# Patient Record
Sex: MALE | Race: WHITE | NOT HISPANIC OR LATINO | Employment: FULL TIME | URBAN - METROPOLITAN AREA
[De-identification: names, ages, dates, MRNs, and addresses within clinical notes are randomized per-mention and may not be internally consistent; named-entity substitution may affect disease eponyms.]

---

## 2017-09-28 ENCOUNTER — GENERIC CONVERSION - ENCOUNTER (OUTPATIENT)
Dept: FAMILY MEDICINE CLINIC | Facility: CLINIC | Age: 47
End: 2017-09-28

## 2017-12-22 ENCOUNTER — ALLSCRIPTS OFFICE VISIT (OUTPATIENT)
Dept: OTHER | Facility: OTHER | Age: 47
End: 2017-12-22

## 2017-12-22 LAB — HBA1C MFR BLD HPLC: 5.1 %

## 2018-01-23 VITALS
DIASTOLIC BLOOD PRESSURE: 108 MMHG | HEIGHT: 70 IN | HEART RATE: 79 BPM | WEIGHT: 189 LBS | BODY MASS INDEX: 27.06 KG/M2 | SYSTOLIC BLOOD PRESSURE: 144 MMHG

## 2018-01-23 NOTE — PROGRESS NOTES
Assessment    1  Encounter for preventive health examination (V70 0) (Z00 00)   2  Hypertension (401 9) (I10)   3  Hypercholesterolemia (272 0) (E78 00)   4  Asthma, mild intermittent (493 90) (J45 20)   5  Allergic rhinitis (477 9) (J30 9)   6  Muro's esophagus (530 85) (K22 70)   7  Impaired fasting glucose (790 21) (R73 01)   8  Sleep apnea (780 57) (G47 30)   · no CPAP    Plan  Asthma, mild intermittent    · ProAir  (90 Base) MCG/ACT Inhalation Aerosol Solution; INHALE 2  PUFFS EVERY 6 HOURS AS NEEDED  Hypercholesterolemia    · (1) LIPID PANEL FASTING W DIRECT LDL REFLEX; Status:Active; Requested  for:22Mar2018;   Hypertension    · Bystolic 5 MG Oral Tablet   · Benazepril HCl - 40 MG Oral Tablet; take 1 tablet by mouth daily   · HydroCHLOROthiazide 12 5 MG Oral Capsule; TAKE ONE CAPSULE BY  MOUTH EVERY DAY  Impaired fasting glucose    · Hemoglobin A1c- POC; Status:Complete;   Done: 51QOY6122 09:34AM  SocHx: Marital History    · Atenolol 50 MG Oral Tablet; TAKE 1 TABLET DAILY    Discussion/Summary  Impression: health maintenance visit  Currently, he encouraged to improve diet and exercise - aim 150 minutes/week  Prostate cancer screening: PSA is not indicated  Testicular cancer screening: the risks and benefits of testicular cancer screening were discussed and monthly self testicular exam was advised  Colorectal cancer screening: the next colonoscopy is due overdue  Screening lab work includes labs reviewed from work Pe  The immunizations are up to date  Advice and education were given regarding nutrition, aerobic exercise and weight loss  Patient discussion: discussed with the patient  1  Mild intermittent asthma -the patient will continue with ProAir as needed  2  Allergic rhinitis -patient will continue with Claritin D rarely as needed  3  Muro's esophagus -he will continue with the Nexium 40 mg twice daily and will be following up with GI soon      4  Hypercholesterolemia -we reviewed that based upon his LDL of 191, it is recommended that he start a cholesterol-lowering medication  He would prefer to be able to lower this on his own with diet and exercise  He will try to limit his fat intake and to restart exercising on a regular basis  I encouraged him to aim for 150 minutes of exercise on a weekly basis  He should also limit his red meat intake to 2 times per week and limit his intake of fried foods and water  He was provided a lipid panel to repeat in about 3 months  5  Impaired fasting glucose -we reviewed that his blood sugar during his recent work physical was mildly elevated at 114  An A1c was performed today in the office but was normal at 5 1%  We will continue monitor this  6  Hypertension -uncontrolled -the patient will continue with benazepril 40 mg daily and hydrochlorothiazide 12 5 mg daily  His Bystolic 5 mg was changed to atenolol 50 mg daily  He will follow up in about 1 month to assess how he is tolerating the medication and how it is working for him  He should call with any problems or concerns in the interim  Possible side effects of new medications were reviewed with the patient/guardian today  The treatment plan was reviewed with the patient/guardian  The patient/guardian understands and agrees with the treatment plan      Chief Complaint  physical      History of Present Illness  HM, Adult Male: The patient is being seen for a health maintenance evaluation  General Health: The patient's health since the last visit is described as fair  He does not have regular dental visits  The patient reports his last dental visit was 1-2 years  He denies vision problems (but has sarcoid that he should be following up on)  Vision care includes an eye examination more than a year ago  He denies hearing loss  Immunizations status: not up to date  Lifestyle:  He does not have a healthy diet  He does not exercise regularly   He uses tobacco  The patient occasional cigarette over the last few months  He consumes alcohol  He denies drug use  Reproductive health:  He denies erectile dysfunction  Screening: Prostate cancer screening includes no previous digital rectal examination  Testicular cancer screening includes no self testicular examinations  Colorectal cancer screening includes last colonoscopy done 1/2014  Metabolic screening includes lipid profile performed 2017 and glucose screening performed 2017  Safety elements used: seat belt, sunscreen, smoke detector and carbon monoxide detector  Risk findings: no domestic violence  HPI: notes that he has gained weight over the last 3-4 months since traveling and eating out more/exercising less     no BP checks lately - needs to change Bystolic to generic beta-blocker since insurance no longer wants to cover it    notes that he had a work physical - notes that he had gotten diet down to 172 pounds in the past - concerned about high cholesterol readings - knows that he needs to make changes with his diet and exercise but concerned since cholesterol was elevated    has some allergies and asthma - takes the Claritin-D rarely and ProAir <1 time a month     going to schedule EGD and colonoscopy today for soon - has GERD symptoms occasionally - sometimes forgets the second Nexium (about 3 times a week) and does get symptomatic on those days      Review of Systems    Constitutional: no fever and no chills  Cardiovascular: no chest pain and no palpitations  Respiratory: no shortness of breath, no cough and no wheezing  Gastrointestinal: no nausea, no vomiting, no diarrhea and no blood in stools  Genitourinary: no dysuria  Musculoskeletal: cramps occasional in the calves, but no arthralgias and no myalgias  Integumentary: no rashes and no skin lesions  Neurological: no headache, no dizziness and no fainting  Psychiatric: no anxiety and no depression  Endocrine: no erectile dysfunction     Hematologic/Lymphatic: no tendency for easy bleeding and no tendency for easy bruising  Active Problems    1  Allergic rhinitis (477 9) (J30 9)   2  Allergy to insect bites and stings (V15 06) (Z91 038)   3  Asthma, mild intermittent (493 90) (J45 20)   4  Muro's esophagus (530 85) (K22 70)   5  Hematuria (599 70) (R31 9)   6  Hiatal hernia (553 3) (K44 9)   7  History of allergy (V15 09) (Z88 9)   8  Hypercholesterolemia (272 0) (E78 00)   9  Hypertension (401 9) (I10)   10  Need for revaccination (V05 9) (Z23)   11  Need for Tdap vaccination (V06 1) (Z23)   12  Sarcoidosis (135) (D86 9)   13  Sleep apnea (780 57) (G47 30)   14  Stress reaction, acute, mixed (308 4) (F43 0)    Past Medical History    · History of Acute upper respiratory infection (465 9) (J06 9)   · History of chest pain (V13 89) (F50 673)   · History of upper respiratory infection (V12 09) (Z87 09)   · History of Sarcoidosis (135) (D86 9)    Surgical History    · History of Complete Colonoscopy   · History of Diagnostic Cystoscopy   · History of Diagnostic Esophagogastroduodenoscopy    Family History  Mother    · Family history of Colon Cancer (V16 0)  Father    · Family history of hyperlipidemia (V18 19) (Z83 49)   · Family history of Pancreatic Adenocarcinoma Of The Ampulla Of Vater  Sister    · Family history of Cholecystectomy  Maternal Uncle    · Family history of Colon Cancer (V16 0)    Social History    · Alcohol Use (History)   · APPROX 20 BEER/WK   · Denied: History of Drug Use   · Former cigarette smoker (V15 82) (L84 325)   · smoked about 1 pack every 2 weeks for 2 years in his 25s   · Marital History   · HOME WITH WIFE AND DAUGHTER   · Work History   ·     Current Meds   1  Benazepril HCl - 40 MG Oral Tablet; take 1 tablet by mouth daily; Therapy: 89DGT6160 to (Evaluate:15Apr2018)  Requested for: 40ARX7952; Last   Rx:54Nyd1788 Ordered   2  Bystolic 5 MG Oral Tablet; take 1 tablet by mouth every day;    Therapy: 08QXM0472 to (Evaluate:07Jan2018) Requested for: 28SIK5163; Last   Rx:25Oai2030 Ordered   3  Claritin-D 24 Hour  MG Oral Tablet Extended Release 24 Hour; TAKE 1 TABLET   DAILY AS DIRECTED; Therapy: 50BZH0813 to Recorded   4  HydroCHLOROthiazide 12 5 MG Oral Capsule; TAKE ONE CAPSULE BY MOUTH   EVERY DAY; Therapy: 63FDP3263 to (Navdeep Chauhan)  Requested for: 44NSI7179; Last   Rx:09Jan2017 Ordered   5  NexIUM 40 MG Oral Capsule Delayed Release; TAKE 1 CAPSULE TWICE DAILY; Therapy: 13QRZ5669 to (Evaluate:20Nov2015) Recorded   6  ProAir  (90 Base) MCG/ACT Inhalation Aerosol Solution; INHALE 2 PUFFS   EVERY 6 HOURS AS NEEDED; Therapy: 00JPH7405 to (Evaluate:67Lwg1408)  Requested for: 72YXU9363; Last   Rx:30Nov2015 Ordered    Allergies    1  No Known Drug Allergies    2  Seasonal    Vitals   Recorded: 22Dec2017 09:24AM Recorded: 91Irb8352 08:34AM   Heart Rate  79   Systolic 527 763   Diastolic 517 314   Height  5 ft 10 in   Weight  189 lb    BMI Calculated  27 12   BSA Calculated  2 04     Physical Exam    Constitutional   General appearance: No acute distress, well appearing and well nourished  Head and Face   Head and face: Normal     Eyes   Conjunctiva and lids: No erythema, swelling or discharge  Pupils and irises: Equal, round, reactive to light  Ears, Nose, Mouth, and Throat   External inspection of ears and nose: Normal     Otoscopic examination: Tympanic membranes translucent with normal light reflex  Canals patent without erythema  Hearing: Normal     Nasal mucosa, septum, and turbinates: Normal without edema or erythema  Lips, teeth, and gums: Normal, good dentition  Oropharynx: Normal with no erythema, edema, exudate or lesions  Neck   Neck: Supple, symmetric, trachea midline, no masses  Thyroid: Normal, no thyromegaly  Pulmonary   Respiratory effort: No increased work of breathing or signs of respiratory distress  Auscultation of lungs: Clear to auscultation      Cardiovascular Auscultation of heart: Normal rate and rhythm, normal S1 and S2, no murmurs  Peripheral vascular exam: Normal   Carotid: no bruit on the right and no bruit on the left  Radial: right 2+ and left 2+  Posterior tibialis: right 2+ and left 2+  Examination of extremities for edema and/or varicosities: Normal   no edema  Abdomen   Abdomen: Non-tender, no masses  Liver and spleen: No hepatomegaly or splenomegaly  Lymphatic   Palpation of lymph nodes in neck: No lymphadenopathy  Musculoskeletal   Gait and station: Normal     Muscle strength/tone: Normal   Motor Strength Findings: normal upper extremity strength and normal lower extremity strength  Skin   Skin and subcutaneous tissue: Normal without rashes or lesions  Neurologic   Sensation: No sensory loss  Sensory exam: intact to light touch  Psychiatric   Mood and affect: Normal        Results/Data  PHQ-9 Adult Depression Screening 02Hlm8751 10:22AM User, [facility]     Test Name Result Flag Reference   PHQ-9 Adult Depression Score 7     Over the last two weeks, how often have you been bothered by any of the following problems? Little interest or pleasure in doing things: Several days - 1  Feeling down, depressed, or hopeless: Not at all - 0  Trouble falling or staying asleep, or sleeping too much: More than half the days - 2  Feeling tired or having little energy: More than half the days - 2  Poor appetite or over eating: More than half the days - 2  Feeling bad about yourself - or that you are a failure or have let yourself or your family down: Not at all - 0  Trouble concentrating on things, such as reading the newspaper or watching television: Not at all - 0  Moving or speaking so slowly that other people could have noticed   Or the opposite -  being so fidgety or restless that you have been moving around a lot more than usual: Not at all - 0  Thoughts that you would be better off dead, or of hurting yourself in some way: Not at all - 0   PHQ-9 Adult Depression Screening Negative     PHQ-9 Difficulty Level Not difficult at all     PHQ-9 Severity Mild Depression       Hemoglobin A1c- POC 10YNW7634 09:34AM Germaine Antony     Test Name Result Flag Reference   HEMOGLOBIN A1C 5 1         Future Appointments    Date/Time Provider Specialty Site   01/29/2018 11:20 AM Germaine Antony, [facility] [address] [facility] MEDICINE     Signatures   Electronically signed by : Martina Bailey [facility]; Dec 22 2017 10:48AM EST                       (Author)    Electronically signed by : CARTER Zimmerman ; Dec 22 2017  3:00PM EST

## 2018-01-24 DIAGNOSIS — I10 HYPERTENSION, UNSPECIFIED TYPE: Primary | ICD-10-CM

## 2018-01-24 RX ORDER — ATENOLOL 50 MG/1
1 TABLET ORAL DAILY
COMMUNITY
Start: 2017-12-22 | End: 2018-01-24 | Stop reason: SDUPTHER

## 2018-01-25 RX ORDER — ATENOLOL 50 MG/1
50 TABLET ORAL DAILY
Qty: 30 TABLET | Refills: 5 | Status: SHIPPED | OUTPATIENT
Start: 2018-01-25 | End: 2018-02-12 | Stop reason: SDUPTHER

## 2018-02-09 PROBLEM — R73.01 IMPAIRED FASTING GLUCOSE: Status: ACTIVE | Noted: 2017-12-22

## 2018-02-09 RX ORDER — NEBIVOLOL 5 MG/1
1 TABLET ORAL DAILY
COMMUNITY
Start: 2014-09-24 | End: 2018-02-12

## 2018-02-09 RX ORDER — HYDROCHLOROTHIAZIDE 12.5 MG/1
12.5 CAPSULE, GELATIN COATED ORAL DAILY
COMMUNITY
Start: 2014-12-01 | End: 2018-04-23 | Stop reason: DRUGHIGH

## 2018-02-09 RX ORDER — ESOMEPRAZOLE MAGNESIUM 40 MG/1
1 CAPSULE, DELAYED RELEASE ORAL 2 TIMES DAILY
COMMUNITY
Start: 2014-11-25 | End: 2018-02-12

## 2018-02-09 RX ORDER — ALBUTEROL SULFATE 90 UG/1
2 AEROSOL, METERED RESPIRATORY (INHALATION) EVERY 6 HOURS PRN
COMMUNITY
Start: 2010-11-30

## 2018-02-09 RX ORDER — LORATADINE AND PSEUDOEPHEDRINE 10; 240 MG/1; MG/1
1 TABLET, EXTENDED RELEASE ORAL DAILY PRN
COMMUNITY
Start: 2013-09-24 | End: 2019-09-06

## 2018-02-09 RX ORDER — BENAZEPRIL HYDROCHLORIDE 40 MG/1
40 TABLET, FILM COATED ORAL DAILY
COMMUNITY
Start: 2014-09-24 | End: 2018-03-26 | Stop reason: SDUPTHER

## 2018-02-09 NOTE — PROGRESS NOTES
[facility]  FAMILY PRACTICE OFFICE VISIT       NAME: Franck Trent  AGE: 41 y o  SEX: male       : 1970        MRN: [id number]    DATE: 2018  TIME: 2:02 PM    Assessment and Plan     Problem List Items Addressed This Visit     Hypercholesterolemia     Continue to limit fat intake - recheck lipid panel next month as well as coronary calcium score - will review results at follow-up next month         Relevant Orders    CT coronary calcium score    Hypertension - Primary     Uncontrolled - increase atenolol to 100 mg daily and continue with benazepril 40 mg renita and HCTZ 12 5 mg daily - follow-up in 1 month         Relevant Medications    atenolol (TENORMIN) 100 mg tablet          Hypercholesterolemia  Continue to limit fat intake - recheck lipid panel next month as well as coronary calcium score - will review results at follow-up next month    Hypertension  Uncontrolled - increase atenolol to 100 mg daily and continue with benazepril 40 mg renita and HCTZ 12 5 mg daily - follow-up in 1 month          Chief Complaint     Chief Complaint   Patient presents with    Hypertension       History of Present Illness   Ivy Guerrero is a 41y o -year-old male who presents for HTN follow-up  The patient presents today for follow-up on his high blood pressure  It was uncontrolled that is last appointment about a month and a half ago  He was directed to continue with the benazepril 40 mg daily and hydrochlorothiazide 12 5 mg daily  His Bystolic 5 mg was changed to atenolol 50 mg daily  He returns today to review how he is tolerating the medication and how it is working for him  He states that he has been checking his blood pressure at home in getting readings around 130s/high 90s  He has also been working on decreasing his fat intake and exercising so that he can lower his cholesterol as he was not interested in starting a cholesterol medication    He should be repeating blood work at the end of March to follow-up on this  He notes that his sister suggested that he also get a coronary calcium score to help with decision of whether to start a statin or not  Review of Systems   Review of Systems   Constitutional: Negative for chills and fever  Respiratory: Negative for shortness of breath  Cardiovascular: Negative for chest pain, palpitations and leg swelling  Neurological: Negative for dizziness and headaches  Psychiatric/Behavioral: The patient is nervous/anxious          Active Problem List     Patient Active Problem List   Diagnosis    Allergic rhinitis    Muro's esophagus    Asthma, mild intermittent    Hematuria    Hiatal hernia    Hypercholesterolemia    Hypertension    Impaired fasting glucose    Sarcoidosis    Sleep apnea    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         Past Medical History:  Past Medical History:   Diagnosis Date    Chest pain     Sarcoidosis        Past Surgical History:  Past Surgical History:   Procedure Laterality Date    COLONOSCOPY      complete     CYSTOSCOPY      Diagnostic - Neg 2007    ESOPHAGOGASTRODUODENOSCOPY      Diagnostic        Family History:  Family History   Problem Relation Age of Onset    Colon cancer Mother      age in 35s   Iftikhar Contreras Hyperlipidemia Mother     Hyperlipidemia Father     Pancreatic cancer Father      Adenocarcinoma of the ampulla of vater     Colon cancer Maternal Uncle        Social History:  Social History     Social History    Marital status: /Civil Union     Spouse name: N/A    Number of children: N/A    Years of education: N/A     Occupational History           Social History Main Topics    Smoking status: Former Smoker     Types: Cigarettes     Quit date: 1999    Smokeless tobacco: Former User      Comment: smoked about 1 pack every 2 weeks for 2 years in his 21's     Alcohol use Yes      Comment: Approx 20 beers a week    Iftikhar Contreras Drug use: No    Sexual activity: Not on file     Other Topics Concern    Not on file     Social History Narrative    Lives home with wife and daughter        Objective     Vitals:    02/12/18 1355   BP: (!) 146/102   Pulse:    Temp:      Wt Readings from Last 3 Encounters:   02/12/18 86 8 kg (191 lb 4 oz)   12/22/17 85 7 kg (189 lb)   11/30/15 80 1 kg (176 lb 8 oz)       Physical Exam   Constitutional: He appears well-developed and well-nourished  HENT:   Head: Normocephalic and atraumatic  Neck: Neck supple  No thyromegaly present  Cardiovascular: Normal rate, regular rhythm, normal heart sounds and intact distal pulses  No murmur heard  No carotid bruits noted, no LE edema   Pulmonary/Chest: Effort normal and breath sounds normal    Lymphadenopathy:     He has no cervical adenopathy  Neurological: He is alert  Skin: Skin is warm and dry  Psychiatric: He has a normal mood and affect         Pertinent Laboratory/Diagnostic Studies:    Results for orders placed or performed in visit on 12/22/17   POCT hemoglobin A1c (Historical)   Result Value Ref Range    Hemoglobin A1C 5 1        Orders Placed This Encounter   Procedures    CT coronary calcium score       ALLERGIES:  Allergies   Allergen Reactions    Other      seasonal       Current Medications     Current Outpatient Prescriptions   Medication Sig Dispense Refill    albuterol (PROAIR HFA) 90 mcg/act inhaler Inhale 2 puffs every 6 (six) hours as needed      atenolol (TENORMIN) 100 mg tablet Take 1 tablet (100 mg total) by mouth daily 30 tablet 1    benazepril (LOTENSIN) 40 MG tablet Take 40 mg by mouth daily        hydrochlorothiazide (MICROZIDE) 12 5 mg capsule Take 12 5 mg by mouth daily        loratadine-pseudoephedrine (CLARITIN-D 24 HOUR)  mg per 24 hr tablet Take 1 tablet by mouth daily      omeprazole (PriLOSEC) 40 MG capsule TAKE 1 CAPSULE TWO TIMES DAILY (1/2 HOUR BEFORE BREAKFAST AND DINNER)  2     No current facility-administered medications for this visit            Health Maintenance     Health Maintenance   Topic Date Due    HIV SCREENING  1970    PNEUMOCOCCAL POLYSACCHARIDE VACCINE AGE 2-64 HIGH RISK  02/21/1974    DTaP,Tdap,and Td Vaccines (3 - Td) 07/11/2017    INFLUENZA VACCINE  Completed     Immunization History   Administered Date(s) Administered    Influenza Quadrivalent Preservative Free 3 years and older IM 10/01/2017    Influenza TIV (IM) 01/02/2007, 11/08/2015    Tdap 11/30/2015, 01/11/2017       Tenisha Andrea PA-C  2/12/2018 2:02 PM  Carlos MAURICIO [facility]

## 2018-02-12 ENCOUNTER — OFFICE VISIT (OUTPATIENT)
Dept: FAMILY MEDICINE CLINIC | Facility: CLINIC | Age: 48
End: 2018-02-12
Payer: COMMERCIAL

## 2018-02-12 VITALS
WEIGHT: 191.25 LBS | BODY MASS INDEX: 27.38 KG/M2 | SYSTOLIC BLOOD PRESSURE: 146 MMHG | HEIGHT: 70 IN | DIASTOLIC BLOOD PRESSURE: 102 MMHG | HEART RATE: 108 BPM | TEMPERATURE: 97.7 F

## 2018-02-12 DIAGNOSIS — I10 HYPERTENSION, UNSPECIFIED TYPE: ICD-10-CM

## 2018-02-12 DIAGNOSIS — I10 ESSENTIAL HYPERTENSION: Primary | ICD-10-CM

## 2018-02-12 DIAGNOSIS — E78.00 HYPERCHOLESTEROLEMIA: ICD-10-CM

## 2018-02-12 PROCEDURE — 99213 OFFICE O/P EST LOW 20 MIN: CPT | Performed by: PHYSICIAN ASSISTANT

## 2018-02-12 RX ORDER — ATENOLOL 100 MG/1
100 TABLET ORAL DAILY
Qty: 30 TABLET | Refills: 1 | Status: SHIPPED | OUTPATIENT
Start: 2018-02-12 | End: 2018-04-21 | Stop reason: SDUPTHER

## 2018-02-12 RX ORDER — OMEPRAZOLE 40 MG/1
CAPSULE, DELAYED RELEASE ORAL
Refills: 2 | COMMUNITY
Start: 2018-01-20 | End: 2018-07-23

## 2018-02-12 NOTE — ASSESSMENT & PLAN NOTE
Continue to limit fat intake - recheck lipid panel next month as well as coronary calcium score - will review results at follow-up next month

## 2018-02-12 NOTE — ASSESSMENT & PLAN NOTE
Uncontrolled - increase atenolol to 100 mg daily and continue with benazepril 40 mg renita and HCTZ 12 5 mg daily - follow-up in 1 month

## 2018-02-12 NOTE — PATIENT INSTRUCTIONS
Hypercholesterolemia  Continue to limit fat intake - recheck lipid panel next month as well as coronary calcium score - will review results at follow-up next month    Hypertension  Uncontrolled - increase atenolol to 100 mg daily and continue with benazepril 40 mg renita and HCTZ 12 5 mg daily - follow-up in 1 month

## 2018-02-15 ENCOUNTER — TELEPHONE (OUTPATIENT)
Dept: FAMILY MEDICINE CLINIC | Facility: CLINIC | Age: 48
End: 2018-02-15

## 2018-02-15 DIAGNOSIS — I10 HYPERTENSION, UNSPECIFIED TYPE: Primary | ICD-10-CM

## 2018-02-15 NOTE — TELEPHONE ENCOUNTER
When scheduling CT coronary calc score, he was told that he needs a BUN and Creat done due to htn  Did you want to order anything else?   Call him when order ready

## 2018-02-19 ENCOUNTER — APPOINTMENT (OUTPATIENT)
Dept: LAB | Facility: CLINIC | Age: 48
End: 2018-02-19
Payer: COMMERCIAL

## 2018-02-19 ENCOUNTER — TRANSCRIBE ORDERS (OUTPATIENT)
Dept: LAB | Facility: CLINIC | Age: 48
End: 2018-02-19

## 2018-02-19 DIAGNOSIS — R22.1 LUMP ON NECK: Primary | ICD-10-CM

## 2018-02-19 DIAGNOSIS — I10 HYPERTENSION, UNSPECIFIED TYPE: ICD-10-CM

## 2018-02-19 LAB
BUN SERPL-MCNC: 13 MG/DL (ref 5–25)
CREAT SERPL-MCNC: 0.94 MG/DL (ref 0.6–1.3)
GFR SERPL CREATININE-BSD FRML MDRD: 98 ML/MIN/1.73SQ M

## 2018-02-19 PROCEDURE — 84520 ASSAY OF UREA NITROGEN: CPT

## 2018-02-19 PROCEDURE — 82565 ASSAY OF CREATININE: CPT

## 2018-02-19 PROCEDURE — 36415 COLL VENOUS BLD VENIPUNCTURE: CPT

## 2018-02-28 ENCOUNTER — HOSPITAL ENCOUNTER (OUTPATIENT)
Dept: CT IMAGING | Facility: HOSPITAL | Age: 48
Discharge: HOME | End: 2018-02-28
Payer: COMMERCIAL

## 2018-02-28 ENCOUNTER — APPOINTMENT (OUTPATIENT)
Dept: LAB | Facility: CLINIC | Age: 48
End: 2018-02-28
Payer: COMMERCIAL

## 2018-02-28 DIAGNOSIS — E78.00 HYPERCHOLESTEROLEMIA: ICD-10-CM

## 2018-02-28 DIAGNOSIS — E78.00 PURE HYPERCHOLESTEROLEMIA: ICD-10-CM

## 2018-02-28 LAB
CHOLEST SERPL-MCNC: 270 MG/DL (ref 50–200)
HDLC SERPL-MCNC: 44 MG/DL (ref 40–60)
LDLC SERPL CALC-MCNC: 160 MG/DL (ref 0–100)
TRIGL SERPL-MCNC: 329 MG/DL

## 2018-02-28 PROCEDURE — 36415 COLL VENOUS BLD VENIPUNCTURE: CPT

## 2018-02-28 PROCEDURE — 80061 LIPID PANEL: CPT

## 2018-03-07 NOTE — PROGRESS NOTES
History of Present Illness    Revaccination   Vaccine Information: Vaccine(s) Given (names): Adacel 11/30/15  Pt called (attempt 1): [id number]  l/m  Pt called (attempt 2): [id number] nw  l/m  Pt called (attempt 3): [id number] 5370 nw  l/m  Letter Sent (Regular and Certified): [id number] NW  Revaccination Completed: [id number]  Active Problems    1  Acute upper respiratory infection (465 9) (J06 9)   2  Allergic rhinitis (477 9) (J30 9)   3  Allergy to insect bites and stings (V15 06) (Z91 038)   4  Asthma (493 90) (J45 909)   5  Muro's esophagus (530 85) (K22 70)   6  Chest pain (786 50) (R07 9)   7  Hematuria (599 70) (R31 9)   8  Hiatal hernia (553 3) (K44 9)   9  History of allergy (V15 09) (Z88 9)   10  Hypercholesterolemia (272 0) (E78 00)   11  Hypertension (401 9) (I10)   12  Need for revaccination (V05 9) (Z23)   13  Need for Tdap vaccination (V06 1) (Z23)   14  Sarcoidosis (135) (D86 9)   15  Sleep apnea (780 57) (G47 30)   16  Stress reaction, acute, mixed (308 4) (F43 0)    Immunizations  Influenza --- Erven Brod: [id number] Lilian: 08-Nov-2015   Tdap --- Series1: 30-Nov-2015     Current Meds   1  Benazepril HCl - 40 MG Oral Tablet; take 1 tablet by mouth daily   2  Bystolic 5 MG Oral Tablet; take 1 tablet by mouth every day   3  Claritin-D 24 Hour  MG Oral Tablet Extended Release 24 Hour; TAKE 1 TABLET   DAILY AS DIRECTED   4  HydroCHLOROthiazide 12 5 MG Oral Capsule; TAKE ONE CAPSULE BY MOUTH EVERY   DAY   5  NexIUM 40 MG Oral Capsule Delayed Release; TAKE 1 CAPSULE TWICE DAILY   6  ProAir  (90 Base) MCG/ACT Inhalation Aerosol Solution; INHALE 2 PUFFS   EVERY 6 HOURS AS NEEDED    Allergies    1  No Known Drug Allergies    2   Seasonal    Signatures   Electronically signed by : Bettye Guillen, [facility]; Jan 12 2017  6:55PM EST                       (Author)

## 2018-03-22 DIAGNOSIS — E78.00 PURE HYPERCHOLESTEROLEMIA: ICD-10-CM

## 2018-03-22 NOTE — PROGRESS NOTES
[place] [facility]  FAMILY PRACTICE OFFICE VISIT       NAME: Jaymie Hill  AGE: 57 y o  SEX: male       : 1970        MRN: [id number]    DATE: 3/26/2018  TIME: 9:01 AM    Assessment and Plan     Problem List Items Addressed This Visit     Hypercholesterolemia       We reviewed his recent results from the lipid panel as well as his CT coronary calcium score  His coronary calcium score was 3 and did not show any significant buildup in his vessels  His lipid panel did show an elevated total cholesterol of 270 as well as an LDL of 160  His calculated ASCVD risk including his once weekly cigarette was 16 1%  We reviewed that this drops to 6 5% if he is not smoking any longer  The patient will stop smoking cigarettes  We also agreed that it was appropriate to start atorvastatin 20 mg daily given his family history  We will plan on rechecking lipid panel in 3 months with labs as ordered today  Relevant Medications    atorvastatin (LIPITOR) 20 mg tablet    Other Relevant Orders    Comprehensive metabolic panel    Lipid Panel with Direct LDL reflex    Hypertension - Primary     We reviewed that his blood pressure still elevated today  We also discussed that there appears to be a significant component to his blood pressure elevations in relation to his anxiety level  His blood pressure dropped significantly after taking several deep breaths  He was directed to continue with the atenolol 100 mg daily as well as hydrochlorothiazide 12 5 mg daily  His benazepril was increased to 40 mg twice daily  He will return in 1 month for recheck  He was encouraged to continue checking his blood pressure at home  He was encouraged to bring his blood pressure monitor to his next appointment to verify its accuracy           Relevant Medications    benazepril (LOTENSIN) 40 MG tablet    Other Relevant Orders    Comprehensive metabolic panel    Lipid Panel with Direct LDL reflex    Anxiety The patient was also started on sertraline 25 mg daily  He will return in about 1 month and we will assess how he is doing with medication  He should call with any problems or concerns in the interim  Relevant Medications    sertraline (ZOLOFT) 25 mg tablet          Hypertension  We reviewed that his blood pressure still elevated today  We also discussed that there appears to be a significant component to his blood pressure elevations in relation to his anxiety level  His blood pressure dropped significantly after taking several deep breaths  He was directed to continue with the atenolol 100 mg daily as well as hydrochlorothiazide 12 5 mg daily  His benazepril was increased to 40 mg twice daily  He will return in 1 month for recheck  He was encouraged to continue checking his blood pressure at home  He was encouraged to bring his blood pressure monitor to his next appointment to verify its accuracy  Hypercholesterolemia    We reviewed his recent results from the lipid panel as well as his CT coronary calcium score  His coronary calcium score was 3 and did not show any significant buildup in his vessels  His lipid panel did show an elevated total cholesterol of 270 as well as an LDL of 160  His calculated ASCVD risk including his once weekly cigarette was 16 1%  We reviewed that this drops to 6 5% if he is not smoking any longer  The patient will stop smoking cigarettes  We also agreed that it was appropriate to start atorvastatin 20 mg daily given his family history  We will plan on rechecking lipid panel in 3 months with labs as ordered today  Anxiety    The patient was also started on sertraline 25 mg daily  He will return in about 1 month and we will assess how he is doing with medication  He should call with any problems or concerns in the interim  Face to face time for this visit was a total of 45 minutes   Greater than 50% of this time included coordination of care, discussion of treatment options, and counseling  Chief Complaint     Chief Complaint   Patient presents with    Hypertension       History of Present Illness   Miky Kwok is a 57y o -year-old male who presents for BP follow-up  The patient presents today for follow-up on hypertension  At his visit last month, his atenolol was increased to 100 mg daily  He was directed to continue with his benazepril 40 milligrams daily and HCTZ 12 5 milligram daily  He reports the home readings have been approximately 130s/90-95  He denies symptoms such as chest pain, palpitations, headaches, dizziness, vision changes, and shortness of breath  We also discussed that is last visit his high cholesterol  His repeat lipid panel last month showed  An elevated total cholesterol of 270 and an LDL of 160 with a triglyceride level of 329  His calculated ASCVD risk is 16 1% (would be 6 5% without his one weekly cigarette)  His coronary calcium CT showed clear arteries with a score of 3  He notes that his sister is a physcian and suggested that he start atorvastatin  The patient also notes that his wife is concerned about mood swings  He notes that he has noticed it as well  He feels that he has been more strongly reactive towards things than he should be  He feels that a lot of this began in about 2014 when he stopped gambling and cut back a lot on his drinking  He notes that he has been going to [employer] which has been very helpful in finding appreciation "for the small things in life " He reports that he has discussed this with his physician sister and she suggested that he start sertraline 50 mg daily  Review of Systems   Review of Systems   Constitutional: Negative for chills and fever  Respiratory: Negative for shortness of breath  Cardiovascular: Negative for chest pain, palpitations and leg swelling  Neurological: Negative for dizziness and headaches  Psychiatric/Behavioral: Negative for dysphoric mood  The patient is nervous/anxious          Active Problem List     Patient Active Problem List   Diagnosis    Allergic rhinitis    Muro's esophagus    Asthma, mild intermittent    Hematuria    Hiatal hernia    Hypercholesterolemia    Hypertension    Impaired fasting glucose    Sarcoidosis    Sleep apnea    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    Anxiety         Past Medical History:  Past Medical History:   Diagnosis Date    Chest pain     Sarcoidosis        Past Surgical History:  Past Surgical History:   Procedure Laterality Date    COLONOSCOPY      complete     CYSTOSCOPY      Diagnostic - Neg 2007    ESOPHAGOGASTRODUODENOSCOPY      Diagnostic        Family History:  Family History   Problem Relation Age of Onset    Colon cancer Mother      age in 35s   Grant Penrose Hyperlipidemia Mother     Hyperlipidemia Father     Pancreatic cancer Father      Adenocarcinoma of the ampulla of vater     Colon cancer Maternal Uncle        Social History:  Social History     Social History    Marital status: /Civil Union     Spouse name: N/A    Number of children: N/A    Years of education: N/A     Occupational History           Social History Main Topics    Smoking status: Former Smoker     Types: Cigarettes     Quit date: 1999    Smokeless tobacco: Former User      Comment: smoked about 1 pack every 2 weeks for 2 years in his 21's     Alcohol use Yes      Comment: Approx 20 beers a week     Drug use: No    Sexual activity: Not on file     Other Topics Concern    Not on file     Social History Narrative    Lives home with wife and daughter        Objective     Vitals:    03/26/18 0850   BP: 142/100   Pulse:    SpO2:      Wt Readings from Last 3 Encounters:   03/26/18 84 9 kg (187 lb 4 oz)   02/12/18 86 8 kg (191 lb 4 oz)   12/22/17 85 7 kg (189 lb)       Physical Exam   Constitutional: He appears well-developed and well-nourished  HENT:   Head: Normocephalic and atraumatic  Neck: Neck supple  No thyromegaly present  Cardiovascular: Normal rate, regular rhythm, normal heart sounds and intact distal pulses  No murmur heard  No carotid bruits noted   Pulmonary/Chest: Effort normal and breath sounds normal    Lymphadenopathy:     He has no cervical adenopathy  Neurological: He is alert  Skin: Skin is warm and dry  Psychiatric:   Dysphoric        Pertinent Laboratory/Diagnostic Studies:  Lab Results   Component Value Date    CHOL 270 (H) 02/28/2018     Lab Results   Component Value Date    TRIG 329 (H) 02/28/2018     Lab Results   Component Value Date    HDL 44 02/28/2018     Lab Results   Component Value Date    LDLCALC 160 (H) 02/28/2018     Lab Results   Component Value Date    HGBA1C 5 1 12/22/2017       Results for orders placed or performed in visit on 02/28/18   Lipid Panel with Direct LDL reflex   Result Value Ref Range    Cholesterol 270 (H) 50 - 200 mg/dL    Triglycerides 329 (H) <=150 mg/dL    HDL, Direct 44 40 - 60 mg/dL    LDL Calculated 160 (H) 0 - 100 mg/dL       Orders Placed This Encounter   Procedures    Comprehensive metabolic panel    Lipid Panel with Direct LDL reflex       ALLERGIES:  Allergies   Allergen Reactions    Other      seasonal       Current Medications     Current Outpatient Prescriptions   Medication Sig Dispense Refill    albuterol (PROAIR HFA) 90 mcg/act inhaler Inhale 2 puffs every 6 (six) hours as needed      atenolol (TENORMIN) 100 mg tablet Take 1 tablet (100 mg total) by mouth daily 30 tablet 1    benazepril (LOTENSIN) 40 MG tablet Take 1 tablet twice daily   60 tablet 5    hydrochlorothiazide (MICROZIDE) 12 5 mg capsule Take 12 5 mg by mouth daily        loratadine-pseudoephedrine (CLARITIN-D 24 HOUR)  mg per 24 hr tablet Take 1 tablet by mouth daily      omeprazole (PriLOSEC) 40 MG capsule TAKE 1 CAPSULE TWO TIMES DAILY (1/2 HOUR BEFORE BREAKFAST AND DINNER)  2    atorvastatin (LIPITOR) 20 mg tablet Take 1 tablet (20 mg total) by mouth daily 30 tablet 3    sertraline (ZOLOFT) 25 mg tablet Take 1 tablet (25 mg total) by mouth daily 30 tablet 1     No current facility-administered medications for this visit            Health Maintenance     Health Maintenance   Topic Date Due    HIV SCREENING  1970    PNEUMOCOCCAL POLYSACCHARIDE VACCINE AGE 2-64 HIGH RISK  02/21/1974    Depression Screening PHQ-9  12/22/2018    DTaP,Tdap,and Td Vaccines (2 - Td) 01/11/2027    INFLUENZA VACCINE  Completed     Immunization History   Administered Date(s) Administered    Influenza Quadrivalent Preservative Free 3 years and older IM 10/01/2017    Influenza TIV (IM) 01/02/2007, 11/08/2015    Tdap 11/30/2015, 01/11/2017       Dena Jj PA-C  3/26/2018 9:01 AM  [facility]

## 2018-03-26 ENCOUNTER — OFFICE VISIT (OUTPATIENT)
Dept: FAMILY MEDICINE CLINIC | Facility: CLINIC | Age: 48
End: 2018-03-26
Payer: COMMERCIAL

## 2018-03-26 VITALS
WEIGHT: 187.25 LBS | BODY MASS INDEX: 26.81 KG/M2 | SYSTOLIC BLOOD PRESSURE: 142 MMHG | OXYGEN SATURATION: 97 % | HEART RATE: 70 BPM | DIASTOLIC BLOOD PRESSURE: 100 MMHG | HEIGHT: 70 IN

## 2018-03-26 DIAGNOSIS — E78.00 HYPERCHOLESTEROLEMIA: ICD-10-CM

## 2018-03-26 DIAGNOSIS — F41.9 ANXIETY: ICD-10-CM

## 2018-03-26 DIAGNOSIS — I10 ESSENTIAL HYPERTENSION: Primary | ICD-10-CM

## 2018-03-26 PROCEDURE — 99214 OFFICE O/P EST MOD 30 MIN: CPT | Performed by: PHYSICIAN ASSISTANT

## 2018-03-26 RX ORDER — ATORVASTATIN CALCIUM 20 MG/1
20 TABLET, FILM COATED ORAL DAILY
Qty: 30 TABLET | Refills: 3 | Status: SHIPPED | OUTPATIENT
Start: 2018-03-26 | End: 2018-08-31 | Stop reason: ALTCHOICE

## 2018-03-26 RX ORDER — SERTRALINE HYDROCHLORIDE 25 MG/1
25 TABLET, FILM COATED ORAL DAILY
Qty: 30 TABLET | Refills: 1 | Status: SHIPPED | OUTPATIENT
Start: 2018-03-26 | End: 2018-04-23 | Stop reason: SDUPTHER

## 2018-03-26 RX ORDER — BENAZEPRIL HYDROCHLORIDE 40 MG/1
TABLET, FILM COATED ORAL
Qty: 60 TABLET | Refills: 5 | Status: SHIPPED | OUTPATIENT
Start: 2018-03-26 | End: 2019-03-01 | Stop reason: SDUPTHER

## 2018-03-26 NOTE — PATIENT INSTRUCTIONS
Hypertension  We reviewed that his blood pressure still elevated today  We also discussed that there appears to be a significant component to his blood pressure elevations in relation to his anxiety level  His blood pressure dropped significantly after taking several deep breaths  He was directed to continue with the atenolol 100 mg daily as well as hydrochlorothiazide 12 5 mg daily  His benazepril was increased to 40 mg twice daily  He will return in 1 month for recheck  He was encouraged to continue checking his blood pressure at home  He was encouraged to bring his blood pressure monitor to his next appointment to verify its accuracy  Hypercholesterolemia    We reviewed his recent results from the lipid panel as well as his CT coronary calcium score  His coronary calcium score was 3 and did not show any significant buildup in his vessels  His lipid panel did show an elevated total cholesterol of 270 as well as an LDL of 160  His calculated ASCVD risk including his once weekly cigarette was 16 1%  We reviewed that this drops to 6 5% if he is not smoking any longer  The patient will stop smoking cigarettes  We also agreed that it was appropriate to start atorvastatin 20 mg daily given his family history  We will plan on rechecking lipid panel in 3 months with labs as ordered today  Anxiety    The patient was also started on sertraline 25 mg daily  He will return in about 1 month and we will assess how he is doing with medication  He should call with any problems or concerns in the interim

## 2018-04-21 DIAGNOSIS — I10 HYPERTENSION, UNSPECIFIED TYPE: ICD-10-CM

## 2018-04-21 NOTE — PROGRESS NOTES
[place] [facility]  FAMILY PRACTICE OFFICE VISIT       NAME: Anna Rodriges  AGE: 57 y o  SEX: male       : 1970        MRN: [id number]    DATE: 2018  TIME: 8:25 AM    Assessment and Plan     Problem List Items Addressed This Visit     Hypercholesterolemia       Doing well since starting atorvastatin last month  He will continue with atorvastatin 20 mg daily and recheck labs in about 2 months as previously ordered  Hypertension - Primary     Improved with the increase in benazepril dose  We reviewed that his blood pressure is still slightly elevated though  He will continue with the atenolol 100 mg daily and benazepril 40 mg twice daily  He will increase hydrochlorothiazide to 25 mg daily  He was encouraged to continue to monitor blood pressure at home or the store  We reviewed that his goal would be to remain below 140/90  He was encouraged to continue to limit salt and caffeine intake  Relevant Medications    hydrochlorothiazide (HYDRODIURIL) 25 mg tablet    Anxiety       Improved  He will continue with sertraline 25 mg daily  We will reassess this next visit in 3 months  Relevant Medications    sertraline (ZOLOFT) 25 mg tablet          Hypertension  Improved with the increase in benazepril dose  We reviewed that his blood pressure is still slightly elevated though  He will continue with the atenolol 100 mg daily and benazepril 40 mg twice daily  He will increase hydrochlorothiazide to 25 mg daily  He was encouraged to continue to monitor blood pressure at home or the store  We reviewed that his goal would be to remain below 140/90  He was encouraged to continue to limit salt and caffeine intake  Anxiety    Improved  He will continue with sertraline 25 mg daily  We will reassess this next visit in 3 months  Hypercholesterolemia    Doing well since starting atorvastatin last month    He will continue with atorvastatin 20 mg daily and recheck labs in about 2 months as previously ordered  Chief Complaint     Chief Complaint   Patient presents with    Hypertension    Anxiety       History of Present Illness   Dara Gleason is a 57y o -year-old male who presents for 1 month follow-up  The patient presents today to follow-up on anxiety and HTN  At his visit last month, he was started on Zoloft 25 mg daily to help with his anxiety  He reports that this medication has been improved/calmer  He feels no side effects from the new medication  He had reports his wife was concerned about mood swings (that began when he stopped gambling and cut back on drinking in 2014) and states that she has not been complaining lately  The patient reports that current blood pressure medications include atenolol 100 mg daily, HCTZ 12 5 mg daily and recently increased at last visit, benazepril 40 mg twice daily  Blood pressure readings at home are approximately 120-130s/high 80s-90s  The patient denies symptoms of poor control such as chest pain, shortness of breath, leg swelling, vision changes, headaches, or dizziness  The patient reports has 1-2 cups of coffee in the morning and tries limit salt intake  The patient was started on atorvastatin 20 milligrams daily at his visit last month  Last LDL was 160 and he will be rechecking lipid panel in 3 months  The patient denies myalgias  Review of Systems   Review of Systems   Constitutional: Negative for chills and fever  Respiratory: Negative for shortness of breath  Cardiovascular: Negative for chest pain, palpitations and leg swelling  Neurological: Negative for dizziness and headaches  Psychiatric/Behavioral: The patient is not nervous/anxious          Active Problem List     Patient Active Problem List   Diagnosis    Allergic rhinitis    Muro's esophagus    Asthma, mild intermittent    Hematuria    Hiatal hernia    Hypercholesterolemia    Hypertension    Impaired fasting glucose    Sarcoidosis    Sleep apnea    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    Anxiety         Past Medical History:  Past Medical History:   Diagnosis Date    Chest pain     Sarcoidosis        Past Surgical History:  Past Surgical History:   Procedure Laterality Date    COLONOSCOPY      complete     CYSTOSCOPY      Diagnostic - Neg 2007    ESOPHAGOGASTRODUODENOSCOPY      Diagnostic        Family History:  Family History   Problem Relation Age of Onset    Colon cancer Mother      age in 29s    Hyperlipidemia Mother     Hyperlipidemia Father     Pancreatic cancer Father      Adenocarcinoma of the ampulla of vater     Colon cancer Maternal Uncle        Social History:  Social History     Social History    Marital status: /Civil Union     Spouse name: N/A    Number of children: N/A    Years of education: N/A     Occupational History           Social History Main Topics    Smoking status: Former Smoker     Types: Cigarettes     Quit date: 1999    Smokeless tobacco: Former User      Comment: smoked about 1 pack every 2 weeks for 2 years in his 21's     Alcohol use Yes      Comment: Approx 20 beers a week     Drug use: No    Sexual activity: Not on file     Other Topics Concern    Not on file     Social History Narrative    Lives home with wife and daughter        Objective     Vitals:    04/23/18 0825   BP: 130/90   Pulse:    SpO2:      Wt Readings from Last 3 Encounters:   04/23/18 85 kg (187 lb 8 oz)   03/26/18 84 9 kg (187 lb 4 oz)   02/12/18 86 8 kg (191 lb 4 oz)       Physical Exam   Constitutional: He appears well-developed and well-nourished  HENT:   Head: Normocephalic and atraumatic  Neck: Neck supple  No thyromegaly present  Cardiovascular: Normal rate, regular rhythm, normal heart sounds and intact distal pulses  No murmur heard    No carotid bruits noted   Pulmonary/Chest: Effort normal and breath sounds normal    Musculoskeletal: He exhibits no edema  Lymphadenopathy:     He has no cervical adenopathy  Neurological: He is alert  Psychiatric: He has a normal mood and affect  Pertinent Laboratory/Diagnostic Studies:  Lab Results   Component Value Date    GLUCOSE 99 07/25/2014    BUN 13 02/19/2018    CREATININE 0 94 02/19/2018    CALCIUM 9 8 07/25/2014     07/25/2014    K 3 5 07/25/2014    CO2 29 07/25/2014     07/25/2014     Lab Results   Component Value Date    ALT 64 07/25/2014    AST 30 07/25/2014    ALKPHOS 77 07/25/2014    BILITOT 0 66 07/25/2014       Lab Results   Component Value Date    WBC 7 82 07/25/2014    HGB 16 6 07/25/2014    HCT 47 6 07/25/2014    MCV 88 07/25/2014     07/25/2014     Lab Results   Component Value Date    CHOL 270 (H) 02/28/2018     Lab Results   Component Value Date    TRIG 329 (H) 02/28/2018     Lab Results   Component Value Date    HDL 44 02/28/2018     Lab Results   Component Value Date    LDLCALC 160 (H) 02/28/2018     Lab Results   Component Value Date    HGBA1C 5 1 12/22/2017       Results for orders placed or performed in visit on 02/28/18   Lipid Panel with Direct LDL reflex   Result Value Ref Range    Cholesterol 270 (H) 50 - 200 mg/dL    Triglycerides 329 (H) <=150 mg/dL    HDL, Direct 44 40 - 60 mg/dL    LDL Calculated 160 (H) 0 - 100 mg/dL         ALLERGIES:  Allergies   Allergen Reactions    Other      seasonal       Current Medications     Current Outpatient Prescriptions   Medication Sig Dispense Refill    albuterol (PROAIR HFA) 90 mcg/act inhaler Inhale 2 puffs every 6 (six) hours as needed      atenolol (TENORMIN) 100 mg tablet TAKE 1 TABLET (100 MG TOTAL) BY MOUTH DAILY 30 tablet 5    atorvastatin (LIPITOR) 20 mg tablet Take 1 tablet (20 mg total) by mouth daily 30 tablet 3    benazepril (LOTENSIN) 40 MG tablet Take 1 tablet twice daily   60 tablet 5    loratadine-pseudoephedrine (CLARITIN-D 24 HOUR)  mg per 24 hr tablet Take 1 tablet by mouth daily      omeprazole (PriLOSEC) 40 MG capsule TAKE 1 CAPSULE TWO TIMES DAILY (1/2 HOUR BEFORE BREAKFAST AND DINNER)  2    sertraline (ZOLOFT) 25 mg tablet Take 1 tablet (25 mg total) by mouth daily 90 tablet 1    hydrochlorothiazide (HYDRODIURIL) 25 mg tablet Take 1 tablet (25 mg total) by mouth daily 90 tablet 1     No current facility-administered medications for this visit            Health Maintenance     Health Maintenance   Topic Date Due    HIV SCREENING  1970    PNEUMOCOCCAL POLYSACCHARIDE VACCINE AGE 2-64 HIGH RISK  02/21/1974    Depression Screening PHQ-9  03/26/2019    COLONOSCOPY  02/05/2020    DTaP,Tdap,and Td Vaccines (2 - Td) 01/11/2027    INFLUENZA VACCINE  Completed     Immunization History   Administered Date(s) Administered    Influenza Quadrivalent Preservative Free 3 years and older IM 10/01/2017    Influenza TIV (IM) 01/02/2007, 11/08/2015    Tdap 11/30/2015, 01/11/2017       Luc Christianson PA-C  4/23/2018 8:25 AM  [facility]

## 2018-04-22 RX ORDER — ATENOLOL 100 MG/1
100 TABLET ORAL DAILY
Qty: 30 TABLET | Refills: 5 | Status: SHIPPED | OUTPATIENT
Start: 2018-04-22 | End: 2018-10-20 | Stop reason: SDUPTHER

## 2018-04-23 ENCOUNTER — OFFICE VISIT (OUTPATIENT)
Dept: FAMILY MEDICINE CLINIC | Facility: CLINIC | Age: 48
End: 2018-04-23
Payer: COMMERCIAL

## 2018-04-23 VITALS
SYSTOLIC BLOOD PRESSURE: 130 MMHG | OXYGEN SATURATION: 97 % | BODY MASS INDEX: 26.84 KG/M2 | HEART RATE: 68 BPM | HEIGHT: 70 IN | DIASTOLIC BLOOD PRESSURE: 90 MMHG | WEIGHT: 187.5 LBS

## 2018-04-23 DIAGNOSIS — E78.00 HYPERCHOLESTEROLEMIA: ICD-10-CM

## 2018-04-23 DIAGNOSIS — I10 ESSENTIAL HYPERTENSION: Primary | ICD-10-CM

## 2018-04-23 DIAGNOSIS — F41.9 ANXIETY: ICD-10-CM

## 2018-04-23 PROCEDURE — 99213 OFFICE O/P EST LOW 20 MIN: CPT | Performed by: PHYSICIAN ASSISTANT

## 2018-04-23 RX ORDER — SERTRALINE HYDROCHLORIDE 25 MG/1
25 TABLET, FILM COATED ORAL DAILY
Qty: 90 TABLET | Refills: 1 | Status: SHIPPED | OUTPATIENT
Start: 2018-04-23 | End: 2018-05-23 | Stop reason: SDUPTHER

## 2018-04-23 RX ORDER — HYDROCHLOROTHIAZIDE 25 MG/1
25 TABLET ORAL DAILY
Qty: 90 TABLET | Refills: 1 | Status: SHIPPED | OUTPATIENT
Start: 2018-04-23 | End: 2018-11-10 | Stop reason: SDUPTHER

## 2018-04-23 NOTE — PATIENT INSTRUCTIONS
Hypertension  Improved with the increase in benazepril dose  We reviewed that his blood pressure is still slightly elevated though  He will continue with the atenolol 100 mg daily and benazepril 40 mg twice daily  He will increase hydrochlorothiazide to 25 mg daily  He was encouraged to continue to monitor blood pressure at home or the store  We reviewed that his goal would be to remain below 140/90  He was encouraged to continue to limit salt and caffeine intake  Anxiety    Improved  He will continue with sertraline 25 mg daily  We will reassess this next visit in 3 months  Hypercholesterolemia    Doing well since starting atorvastatin last month  He will continue with atorvastatin 20 mg daily and recheck labs in about 2 months as previously ordered

## 2018-04-23 NOTE — ASSESSMENT & PLAN NOTE
Doing well since starting atorvastatin last month  He will continue with atorvastatin 20 mg daily and recheck labs in about 2 months as previously ordered

## 2018-04-23 NOTE — ASSESSMENT & PLAN NOTE
Improved with the increase in benazepril dose  We reviewed that his blood pressure is still slightly elevated though  He will continue with the atenolol 100 mg daily and benazepril 40 mg twice daily  He will increase hydrochlorothiazide to 25 mg daily  He was encouraged to continue to monitor blood pressure at home or the store  We reviewed that his goal would be to remain below 140/90  He was encouraged to continue to limit salt and caffeine intake

## 2018-04-23 NOTE — ASSESSMENT & PLAN NOTE
Improved  He will continue with sertraline 25 mg daily  We will reassess this next visit in 3 months

## 2018-05-22 DIAGNOSIS — F41.9 ANXIETY: ICD-10-CM

## 2018-05-23 RX ORDER — SERTRALINE HYDROCHLORIDE 25 MG/1
25 TABLET, FILM COATED ORAL DAILY
Qty: 30 TABLET | Refills: 2 | Status: SHIPPED | OUTPATIENT
Start: 2018-05-23 | End: 2018-07-23 | Stop reason: SDUPTHER

## 2018-07-17 ENCOUNTER — APPOINTMENT (OUTPATIENT)
Dept: LAB | Facility: MEDICAL CENTER | Age: 48
End: 2018-07-17
Payer: COMMERCIAL

## 2018-07-17 DIAGNOSIS — E78.00 HYPERCHOLESTEROLEMIA: ICD-10-CM

## 2018-07-17 DIAGNOSIS — I10 ESSENTIAL HYPERTENSION: ICD-10-CM

## 2018-07-17 LAB
ALBUMIN SERPL BCP-MCNC: 3.9 G/DL (ref 3.5–5)
ALP SERPL-CCNC: 42 U/L (ref 46–116)
ALT SERPL W P-5'-P-CCNC: 143 U/L (ref 12–78)
ANION GAP SERPL CALCULATED.3IONS-SCNC: 4 MMOL/L (ref 4–13)
AST SERPL W P-5'-P-CCNC: 81 U/L (ref 5–45)
BILIRUB SERPL-MCNC: 0.54 MG/DL (ref 0.2–1)
BUN SERPL-MCNC: 10 MG/DL (ref 5–25)
CALCIUM SERPL-MCNC: 9 MG/DL (ref 8.3–10.1)
CHLORIDE SERPL-SCNC: 109 MMOL/L (ref 100–108)
CHOLEST SERPL-MCNC: 199 MG/DL (ref 50–200)
CO2 SERPL-SCNC: 27 MMOL/L (ref 21–32)
CREAT SERPL-MCNC: 0.82 MG/DL (ref 0.6–1.3)
GFR SERPL CREATININE-BSD FRML MDRD: 106 ML/MIN/1.73SQ M
GLUCOSE P FAST SERPL-MCNC: 89 MG/DL (ref 65–99)
HDLC SERPL-MCNC: 63 MG/DL (ref 40–60)
LDLC SERPL CALC-MCNC: 111 MG/DL (ref 0–100)
POTASSIUM SERPL-SCNC: 3.8 MMOL/L (ref 3.5–5.3)
PROT SERPL-MCNC: 7.7 G/DL (ref 6.4–8.2)
SODIUM SERPL-SCNC: 140 MMOL/L (ref 136–145)
TRIGL SERPL-MCNC: 127 MG/DL

## 2018-07-17 PROCEDURE — 80053 COMPREHEN METABOLIC PANEL: CPT

## 2018-07-17 PROCEDURE — 36415 COLL VENOUS BLD VENIPUNCTURE: CPT

## 2018-07-17 PROCEDURE — 80061 LIPID PANEL: CPT

## 2018-07-20 NOTE — PROGRESS NOTES
[place] [facility]  FAMILY PRACTICE OFFICE VISIT       NAME: Janice Ibanez  AGE: 57 y o  SEX: male       : 1970        MRN: [id number]    DATE: 2018  TIME: 7:31 PM    Assessment and Plan     Problem List Items Addressed This Visit     Hypercholesterolemia       We reviewed his previous cholesterol readings  He did have significant improvement with the statin; however, he will remain off of the statin for this time due to the liver function enzyme elevations  He was encouraged to limit fatty foods such as red meat, fried food, butter, and she is  He was also encouraged to restart exercising regularly  We will plan on rechecking this in several months once his liver enzymes are normal to further discuss whether he can restart taking a statin  Relevant Orders    Ambulatory referral to Cardiology    Hypertension - Primary     We reviewed that his blood pressure is elevated today  He seems to be having some mild elevations at home as well  He has decided to continue for now with the atenolol 100 mg daily, benazepril 40 mg twice daily, and hydrochlorothiazide 25 mg daily  He will make some changes with his lifestyle choices and discontinues drinking alcohol, cut back on salt intake, and start exercising regularly  He will continue to monitor his blood pressure at home and let me know if he is finding that his blood pressure is becoming any further elevated  Otherwise he will follow back in 1 month and we will reassess at that time  If he is still having elevations of his blood pressure, we will need to add additional medication for control  Relevant Orders    Ambulatory referral to Cardiology    Anxiety       Anxiety has not been well controlled recently  He has been off of the Zoloft since last week  We discussed that the 25 mg was not likely the dose that was required at that time    He will restart Zoloft 25 mg daily for the next 2 weeks and then increase to 50 mg daily  We will reassess at his next visit  He should call with any problems or concerns in the interim  He was encouraged to also continue with meetings with can was [facility] and alcoholics anonymous  He is not currently interested in seeing a therapist at this time  Relevant Medications    sertraline (ZOLOFT) 50 mg tablet    Abnormal LFTs       We discussed that he had elevations of to liver function tests that were nearly double the upper limits of normal   He was urged to discontinue using alcohol to help with these readings  He will also remain off of the statin which he stopped last week  He was given a slip to recheck his liver function test prior to his next visit in a month  Relevant Orders    Hepatic function panel          Hypertension  We reviewed that his blood pressure is elevated today  He seems to be having some mild elevations at home as well  He has decided to continue for now with the atenolol 100 mg daily, benazepril 40 mg twice daily, and hydrochlorothiazide 25 mg daily  He will make some changes with his lifestyle choices and discontinues drinking alcohol, cut back on salt intake, and start exercising regularly  He will continue to monitor his blood pressure at home and let me know if he is finding that his blood pressure is becoming any further elevated  Otherwise he will follow back in 1 month and we will reassess at that time  If he is still having elevations of his blood pressure, we will need to add additional medication for control  Abnormal LFTs    We discussed that he had elevations of to liver function tests that were nearly double the upper limits of normal   He was urged to discontinue using alcohol to help with these readings  He will also remain off of the statin which he stopped last week  He was given a slip to recheck his liver function test prior to his next visit in a month  Anxiety    Anxiety has not been well controlled recently    He has been off of the Zoloft since last week  We discussed that the 25 mg was not likely the dose that was required at that time  He will restart Zoloft 25 mg daily for the next 2 weeks and then increase to 50 mg daily  We will reassess at his next visit  He should call with any problems or concerns in the interim  He was encouraged to also continue with meetings with can was anonymous and alcoholics anonymous  He is not currently interested in seeing a therapist at this time  Hypercholesterolemia    We reviewed his previous cholesterol readings  He did have significant improvement with the statin; however, he will remain off of the statin for this time due to the liver function enzyme elevations  He was encouraged to limit fatty foods such as red meat, fried food, butter, and she is  He was also encouraged to restart exercising regularly  We will plan on rechecking this in several months once his liver enzymes are normal to further discuss whether he can restart taking a statin  Chief Complaint     Chief Complaint   Patient presents with    Follow-up     HTN, anxiety and review labs   Tick Bite     June 19th,2018    Anxiety     Stopped taking       History of Present Illness   Dara Gleason is a 57y o -year-old male who presents for 3 month follow-up  The patient reports that current blood pressure medications include atenolol 100 mg daily, benazepril 40 mg twice daily, and recently increased hydrochlorothiazide 25 mg daily  Blood pressure readings at home are approximately 130s/high 80s to low 90s  The patient denies symptoms of poor control such as chest pain, shortness of breath, leg swelling, vision changes, headaches, or dizziness  The patient reports 1 daily cup of coffee for caffeine intake and reports regular salt intake  The patient reports that recently anxiety level has been poor    Daily medication includes sertraline 25 mg daily but reports stopping this about 4 days ago  Care team does not include a therapist/psychiatrist - but attends [employer] and recently went to an [address]  The patient denies panic attacks  The patient denies SI/HI  Concerned that his depression could be from his medications  The patient did continue with the atorvastatin 20 mg daily  Last LDL was 111 last week  Also of note in recent labs he had significantly elevated liver enzymes (AST 81, )  He states intake of alcohol is variable - 1 per day the last 3 days over the weekend but at times over the last few months was heavier - notes wife noticed a pattern with drinking more on days he worked from home - also having trouble with short temper, especially last month - wife is concerned and now 2 teenage daughters are as well  The patient notes that his physician sister wants him to see cardiology  The patient notes that he was bit by 2 ticks and took 3 weeks of doxycycline - prescribed by his sister physician  Wondering if needs follow-up testing  No symptoms  Review of Systems   Review of Systems   Constitutional: Negative for chills and fever  Eyes: Negative for visual disturbance  Respiratory: Negative for shortness of breath  Cardiovascular: Negative for chest pain, palpitations and leg swelling  Gastrointestinal: Negative for abdominal pain  Skin: Negative for rash  Neurological: Negative for dizziness and headaches  Psychiatric/Behavioral: Positive for dysphoric mood  The patient is nervous/anxious          Active Problem List     Patient Active Problem List   Diagnosis    Allergic rhinitis    Muro's esophagus    Asthma, mild intermittent    Hematuria    Hiatal hernia    Hypercholesterolemia    Hypertension    Impaired fasting glucose    Sarcoidosis    Sleep apnea    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    Anxiety    Abnormal LFTs         Past Medical History:  Past Medical History: Diagnosis Date    Chest pain     Sarcoidosis        Past Surgical History:  Past Surgical History:   Procedure Laterality Date    COLONOSCOPY      complete     CYSTOSCOPY      Diagnostic - Neg 2007    ESOPHAGOGASTRODUODENOSCOPY      Diagnostic        Family History:  Family History   Problem Relation Age of Onset    Colon cancer Mother         age in 29s    Hyperlipidemia Mother     Hyperlipidemia Father     Pancreatic cancer Father         Adenocarcinoma of the ampulla of vater     Colon cancer Maternal Uncle        Social History:  Social History     Social History    Marital status: /Civil Union     Spouse name: N/A    Number of children: N/A    Years of education: N/A     Occupational History           Social History Main Topics    Smoking status: Former Smoker     Types: Cigarettes     Quit date: 1999    Smokeless tobacco: Former User      Comment: smoked about 1 pack every 2 weeks for 2 years in his 19's     Alcohol use Yes      Comment: Approx 20 beers a week     Drug use: No    Sexual activity: Not on file     Other Topics Concern    Not on file     Social History Narrative    Lives home with wife and daughter        Objective     Vitals:    07/23/18 0911   BP: 142/100   Pulse:    SpO2:      Wt Readings from Last 3 Encounters:   07/23/18 85 4 kg (188 lb 6 oz)   04/23/18 85 kg (187 lb 8 oz)   03/26/18 84 9 kg (187 lb 4 oz)       Physical Exam   Constitutional: He appears well-developed and well-nourished  No distress  Neck: Neck supple  No thyromegaly present  Cardiovascular: Normal rate, regular rhythm, normal heart sounds and intact distal pulses  No murmur heard  Pulmonary/Chest: Effort normal and breath sounds normal  He has no wheezes  He has no rales  Abdominal: Soft  Bowel sounds are normal  He exhibits no distension and no mass  There is no tenderness  Lymphadenopathy:     He has no cervical adenopathy  Skin: Skin is warm and dry  No rash noted  Psychiatric:   Dysphoric    Vitals reviewed        Pertinent Laboratory/Diagnostic Studies:  Lab Results   Component Value Date    GLUCOSE 99 07/25/2014    BUN 10 07/17/2018    CREATININE 0 82 07/17/2018    CALCIUM 9 0 07/17/2018     07/17/2018    K 3 8 07/17/2018    CO2 27 07/17/2018     (H) 07/17/2018     Lab Results   Component Value Date     (H) 07/17/2018    AST 81 (H) 07/17/2018    ALKPHOS 42 (L) 07/17/2018    BILITOT 0 54 07/17/2018       Lab Results   Component Value Date    WBC 7 82 07/25/2014    HGB 16 6 07/25/2014    HCT 47 6 07/25/2014    MCV 88 07/25/2014     07/25/2014     Lab Results   Component Value Date    CHOL 199 07/17/2018     Lab Results   Component Value Date    TRIG 127 07/17/2018     Lab Results   Component Value Date    HDL 63 (H) 07/17/2018     Lab Results   Component Value Date    LDLCALC 111 (H) 07/17/2018     Lab Results   Component Value Date    HGBA1C 5 1 12/22/2017       Results for orders placed or performed in visit on 07/17/18   Comprehensive metabolic panel   Result Value Ref Range    Sodium 140 136 - 145 mmol/L    Potassium 3 8 3 5 - 5 3 mmol/L    Chloride 109 (H) 100 - 108 mmol/L    CO2 27 21 - 32 mmol/L    Anion Gap 4 4 - 13 mmol/L    BUN 10 5 - 25 mg/dL    Creatinine 0 82 0 60 - 1 30 mg/dL    Glucose, Fasting 89 65 - 99 mg/dL    Calcium 9 0 8 3 - 10 1 mg/dL    AST 81 (H) 5 - 45 U/L     (H) 12 - 78 U/L    Alkaline Phosphatase 42 (L) 46 - 116 U/L    Total Protein 7 7 6 4 - 8 2 g/dL    Albumin 3 9 3 5 - 5 0 g/dL    Total Bilirubin 0 54 0 20 - 1 00 mg/dL    eGFR 106 ml/min/1 73sq m   Lipid Panel with Direct LDL reflex   Result Value Ref Range    Cholesterol 199 50 - 200 mg/dL    Triglycerides 127 <=150 mg/dL    HDL, Direct 63 (H) 40 - 60 mg/dL    LDL Calculated 111 (H) 0 - 100 mg/dL       Orders Placed This Encounter   Procedures    Hepatic function panel    Ambulatory referral to Cardiology       ALLERGIES:  Allergies   Allergen Reactions    Other      seasonal       Current Medications     Current Outpatient Prescriptions   Medication Sig Dispense Refill    albuterol (PROAIR HFA) 90 mcg/act inhaler Inhale 2 puffs every 6 (six) hours as needed      atenolol (TENORMIN) 100 mg tablet TAKE 1 TABLET (100 MG TOTAL) BY MOUTH DAILY 30 tablet 5    benazepril (LOTENSIN) 40 MG tablet Take 1 tablet twice daily  60 tablet 5    esomeprazole (NexIUM) 40 MG capsule Take 40 mg by mouth daily  0    hydrochlorothiazide (HYDRODIURIL) 25 mg tablet Take 1 tablet (25 mg total) by mouth daily 90 tablet 1    loratadine-pseudoephedrine (CLARITIN-D 24 HOUR)  mg per 24 hr tablet Take 1 tablet by mouth daily      atorvastatin (LIPITOR) 20 mg tablet Take 1 tablet (20 mg total) by mouth daily 30 tablet 3    sertraline (ZOLOFT) 50 mg tablet Take 1 tablet (50 mg total) by mouth daily 30 tablet 1     No current facility-administered medications for this visit            Health Maintenance     Health Maintenance   Topic Date Due    HIV SCREENING  1970    PNEUMOCOCCAL POLYSACCHARIDE VACCINE AGE 2-64 HIGH RISK  02/21/1974    INFLUENZA VACCINE  09/01/2018    Depression Screening PHQ-9  03/26/2019    CRC Screening: Colonoscopy  02/05/2020    DTaP,Tdap,and Td Vaccines (2 - Td) 01/11/2027     Immunization History   Administered Date(s) Administered    Influenza Quadrivalent Preservative Free 3 years and older IM 10/01/2017    Influenza TIV (IM) 01/02/2007, 11/08/2015    Tdap 11/30/2015, 01/11/2017       Tevin Graham PA-C  7/23/2018 7:31 PM  [facility]

## 2018-07-23 ENCOUNTER — OFFICE VISIT (OUTPATIENT)
Dept: FAMILY MEDICINE CLINIC | Facility: CLINIC | Age: 48
End: 2018-07-23
Payer: COMMERCIAL

## 2018-07-23 VITALS
SYSTOLIC BLOOD PRESSURE: 142 MMHG | BODY MASS INDEX: 26.97 KG/M2 | OXYGEN SATURATION: 96 % | DIASTOLIC BLOOD PRESSURE: 100 MMHG | WEIGHT: 188.38 LBS | HEIGHT: 70 IN | HEART RATE: 70 BPM

## 2018-07-23 DIAGNOSIS — R79.89 ABNORMAL LFTS: ICD-10-CM

## 2018-07-23 DIAGNOSIS — F41.9 ANXIETY: ICD-10-CM

## 2018-07-23 DIAGNOSIS — E78.00 HYPERCHOLESTEROLEMIA: ICD-10-CM

## 2018-07-23 DIAGNOSIS — I10 ESSENTIAL HYPERTENSION: Primary | ICD-10-CM

## 2018-07-23 PROCEDURE — 99214 OFFICE O/P EST MOD 30 MIN: CPT | Performed by: PHYSICIAN ASSISTANT

## 2018-07-23 RX ORDER — ESOMEPRAZOLE MAGNESIUM 40 MG/1
40 CAPSULE, DELAYED RELEASE ORAL 2 TIMES DAILY
Refills: 0 | COMMUNITY
Start: 2018-06-21

## 2018-07-23 NOTE — ASSESSMENT & PLAN NOTE
We reviewed that his blood pressure is elevated today  He seems to be having some mild elevations at home as well  He has decided to continue for now with the atenolol 100 mg daily, benazepril 40 mg twice daily, and hydrochlorothiazide 25 mg daily  He will make some changes with his lifestyle choices and discontinues drinking alcohol, cut back on salt intake, and start exercising regularly  He will continue to monitor his blood pressure at home and let me know if he is finding that his blood pressure is becoming any further elevated  Otherwise he will follow back in 1 month and we will reassess at that time  If he is still having elevations of his blood pressure, we will need to add additional medication for control

## 2018-07-23 NOTE — ASSESSMENT & PLAN NOTE
Anxiety has not been well controlled recently  He has been off of the Zoloft since last week  We discussed that the 25 mg was not likely the dose that was required at that time  He will restart Zoloft 25 mg daily for the next 2 weeks and then increase to 50 mg daily  We will reassess at his next visit  He should call with any problems or concerns in the interim  He was encouraged to also continue with meetings with [facility] and alcoholics anonymous  He is not currently interested in seeing a therapist at this time

## 2018-07-23 NOTE — ASSESSMENT & PLAN NOTE
We discussed that he had elevations of to liver function tests that were nearly double the upper limits of normal   He was urged to discontinue using alcohol to help with these readings  He will also remain off of the statin which he stopped last week  He was given a slip to recheck his liver function test prior to his next visit in a month

## 2018-07-23 NOTE — ASSESSMENT & PLAN NOTE
We reviewed his previous cholesterol readings  He did have significant improvement with the statin; however, he will remain off of the statin for this time due to the liver function enzyme elevations  He was encouraged to limit fatty foods such as red meat, fried food, butter, and she is  He was also encouraged to restart exercising regularly  We will plan on rechecking this in several months once his liver enzymes are normal to further discuss whether he can restart taking a statin

## 2018-07-26 DIAGNOSIS — E78.00 HYPERCHOLESTEROLEMIA: ICD-10-CM

## 2018-07-26 RX ORDER — ATORVASTATIN CALCIUM 20 MG/1
20 TABLET, FILM COATED ORAL DAILY
Qty: 30 TABLET | Refills: 3 | OUTPATIENT
Start: 2018-07-26

## 2018-08-21 ENCOUNTER — APPOINTMENT (OUTPATIENT)
Dept: LAB | Facility: MEDICAL CENTER | Age: 48
End: 2018-08-21
Payer: COMMERCIAL

## 2018-08-21 DIAGNOSIS — R79.89 ABNORMAL LFTS: ICD-10-CM

## 2018-08-21 LAB
ALBUMIN SERPL BCP-MCNC: 3.7 G/DL (ref 3.5–5)
ALP SERPL-CCNC: 54 U/L (ref 46–116)
ALT SERPL W P-5'-P-CCNC: 104 U/L (ref 12–78)
AST SERPL W P-5'-P-CCNC: 38 U/L (ref 5–45)
BILIRUB DIRECT SERPL-MCNC: 0.17 MG/DL (ref 0–0.2)
BILIRUB SERPL-MCNC: 0.72 MG/DL (ref 0.2–1)
PROT SERPL-MCNC: 7.7 G/DL (ref 6.4–8.2)

## 2018-08-21 PROCEDURE — 80076 HEPATIC FUNCTION PANEL: CPT

## 2018-08-21 PROCEDURE — 36415 COLL VENOUS BLD VENIPUNCTURE: CPT

## 2018-08-23 DIAGNOSIS — F41.9 ANXIETY: ICD-10-CM

## 2018-08-23 RX ORDER — SERTRALINE HYDROCHLORIDE 25 MG/1
25 TABLET, FILM COATED ORAL DAILY
Qty: 30 TABLET | Refills: 2 | OUTPATIENT
Start: 2018-08-23

## 2018-08-25 NOTE — PROGRESS NOTES
[place] [facility]  FAMILY PRACTICE OFFICE VISIT       NAME: Dara Gleason  AGE: 57 y o  SEX: male       : 1970        MRN: [id number]    DATE: 2018  TIME: 6:19 PM    Assessment and Plan     Problem List Items Addressed This Visit     Hypercholesterolemia       The patient will remain off of the statin for now  He will repeat blood work prior to his next appointment in a few months to recheck his liver enzymes as well as his cholesterol levels  We reviewed that if his liver enzymes continue to normalize with his decrease in alcohol intake, we can then consider restarting the statin  Relevant Orders    Lipid Panel with Direct LDL reflex    Hypertension - Primary      Improved over the last month  He will continue on atenolol 100 mg daily, benazepril 40 mg twice daily, and hydrochlorothiazide 25 mg daily  He was encouraged to continue to avoid alcohol  He has made positive changes with cutting back on his use of alcohol but has not stopped completely  We will reassess at his next visit in a few months  Anxiety       The patient feels that his anxiety has improved since restarting back on Zoloft  He will continue with Zoloft 50 milligrams daily  He will continue to work on limiting his alcohol intake, but he was encouraged to discontinue drinking altogether  He will continue to go to his gamblers anonymous meetings but states he does not need to go to the alcoholics anonymous meetings  Abnormal LFTs       We reviewed that he had a mild elevation of his ALT but otherwise his liver function tests  At overall improved including a normalized AST  He was encouraged to continue to work on discontinuing his alcohol intake completely  He notes that this is not his goal at this time  Will continue to monitor will recheck liver function tests prior to next visit           Relevant Orders    Hepatic function panel          Hypertension   Improved over the last month   He will continue on atenolol 100 mg daily, benazepril 40 mg twice daily, and hydrochlorothiazide 25 mg daily  He was encouraged to continue to avoid alcohol  He has made positive changes with cutting back on his use of alcohol but has not stopped completely  We will reassess at his next visit in a few months  Abnormal LFTs    We reviewed that he had a mild elevation of his ALT but otherwise his liver function tests  At overall improved including a normalized AST  He was encouraged to continue to work on discontinuing his alcohol intake completely  He notes that this is not his goal at this time  Will continue to monitor will recheck liver function tests prior to next visit  Anxiety    The patient feels that his anxiety has improved since restarting back on Zoloft  He will continue with Zoloft 50 milligrams daily  He will continue to work on limiting his alcohol intake, but he was encouraged to discontinue drinking altogether  He will continue to go to his gamblers anonymous meetings but states he does not need to go to the alcoholics anonymous meetings  Hypercholesterolemia    The patient will remain off of the statin for now  He will repeat blood work prior to his next appointment in a few months to recheck his liver enzymes as well as his cholesterol levels  We reviewed that if his liver enzymes continue to normalize with his decrease in alcohol intake, we can then consider restarting the statin  Chief Complaint     Chief Complaint   Patient presents with    Follow-up     blood pressure       History of Present Illness   Phil Fernando is a 57y o -year-old male who presents for HTN, anxiety, and LFT follow-up  The patient presents today to follow-up on his blood pressure as well as his abnormal liver function tests and anxiety  At his last appointment, his blood pressure was elevated and he had been noting some mild elevations at home    He decided to continue with his atenolol 100 mg daily, benazepril 40 mg twice daily, and hydrochlorothiazide 25 mg daily  He decided that he would prefer to make lifestyle choices in discontinue drinking, cut back on salt, and start exercising regularly to see if these changes would make a difference with his blood pressure prior to adding any additional medication  He reports that readings at store have been approximately 130/90s since his last visit  The patient denies symptoms of poor control such as chest pain, shortness of breath, leg swelling, vision changes, headaches, or dizziness  The patient reports less than a cup of coffee daily for caffeine intake and limited salt intake  We had discussed is last appointment that is liver function test elevations or and concerning ranges and we discussed the importance of him discontinuing alcohol to help decrease these readings  He states since his last visit, he has had decreased alcohol - stopped drinking hard liquor  He has also remained off of the statin which was stopped about a week prior to his last appointment  His liver function tests have improved over the past month  His elevated AST of 81 is now normalized at 38  His elevated ALT of 143 is still elevated mildly but has decreased to 104  At his last appointment a month ago we also discussed for control of his anxiety  He had discontinued the Zoloft at the time of his last appointment  We reviewed that he likely needed increase in the dose rather than a discontinuation of the medication  For this reason, he was restarted on Zoloft 25 mg daily for 2 weeks and then instructed to increase to 50 mg daily  He states that since his last appointment, his anxiety has been better  He has continued to go to gamblers anonymous but not alcoholics anonymous meetings  He was on interested in seeing a therapist at the time of his last appointment  The patient denies panic attacks  The patient denies SI/HI   Today's PHQ9 score was 0       We attributed his last appointment that he had improvement in his cholesterol readings significantly with starting the statin  The medication was put on hold over the last month or so due to the LFT elevations  He reports that he has been cutting back on fatty foods and has not been exercising regularly  The had discussed at his last appointment possibly restarting the statin in several months his still indicated based upon repeat lipid panel after normalization of his liver function enzymes  Review of Systems   Review of Systems   Constitutional: Negative for chills and fever  Respiratory: Negative for shortness of breath  Cardiovascular: Negative for chest pain, palpitations and leg swelling  Neurological: Negative for dizziness and headaches  Psychiatric/Behavioral: Negative for dysphoric mood and suicidal ideas  The patient is nervous/anxious          Active Problem List     Patient Active Problem List   Diagnosis    Allergic rhinitis    Muro's esophagus    Asthma, mild intermittent    Hematuria    Hiatal hernia    Hypercholesterolemia    Hypertension    Impaired fasting glucose    Sarcoidosis    Sleep apnea    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    Anxiety    Abnormal LFTs         Past Medical History:  Past Medical History:   Diagnosis Date    Chest pain     Sarcoidosis        Past Surgical History:  Past Surgical History:   Procedure Laterality Date    COLONOSCOPY      complete     CYSTOSCOPY      Diagnostic - Neg 2007    ESOPHAGOGASTRODUODENOSCOPY      Diagnostic        Family History:  Family History   Problem Relation Age of Onset    Colon cancer Mother         age in 35s   Dina Day Hyperlipidemia Mother     Hyperlipidemia Father     Pancreatic cancer Father         Adenocarcinoma of the ampulla of vater     Colon cancer Maternal Uncle        Social History:  Social History     Social History    Marital status: /Civil [employer]     Spouse name: N/A    Number of children: N/A    Years of education: N/A     Occupational History           Social History Main Topics    Smoking status: Former Smoker     Types: Cigarettes     Quit date: 1999    Smokeless tobacco: Former User      Comment: smoked about 1 pack every 2 weeks for 2 years in his 21's     Alcohol use Yes      Comment: Approx 20 beers a week     Drug use: No    Sexual activity: Not on file     Other Topics Concern    Not on file     Social History Narrative    Lives home with wife and daughter        Objective     Vitals:    08/27/18 1354 08/27/18 1428   BP: 122/88 120/86   BP Location: Left arm    Patient Position: Sitting    Cuff Size: Standard    Pulse: 66    SpO2: 97%    Weight: 85 7 kg (189 lb)    Height: 5' 10" (1 778 m)      Wt Readings from Last 3 Encounters:   08/27/18 85 7 kg (189 lb)   07/23/18 85 4 kg (188 lb 6 oz)   04/23/18 85 kg (187 lb 8 oz)       Physical Exam   Constitutional: He appears well-developed and well-nourished  No distress  Neck: Neck supple  No thyromegaly present  Cardiovascular: Normal rate, regular rhythm, normal heart sounds and intact distal pulses  No murmur heard  Pulmonary/Chest: Breath sounds normal  He has no wheezes  He has no rales  Musculoskeletal: He exhibits no edema  Lymphadenopathy:     He has no cervical adenopathy  Skin: Skin is warm and dry  Psychiatric:   Blunt affect   Vitals reviewed        Pertinent Laboratory/Diagnostic Studies:  Lab Results   Component Value Date    GLUCOSE 99 07/25/2014    BUN 10 07/17/2018    CREATININE 0 82 07/17/2018    CALCIUM 9 0 07/17/2018     07/17/2018    K 3 8 07/17/2018    CO2 27 07/17/2018     (H) 07/17/2018     Lab Results   Component Value Date     (H) 08/21/2018    AST 38 08/21/2018    ALKPHOS 54 08/21/2018    BILITOT 0 66 07/25/2014       Lab Results   Component Value Date    WBC 7 82 07/25/2014    HGB 16 6 07/25/2014    HCT 47 6 07/25/2014    MCV 88 07/25/2014     07/25/2014     Lab Results   Component Value Date    CHOL 239 07/25/2014     Lab Results   Component Value Date    TRIG 127 07/17/2018     Lab Results   Component Value Date    HDL 63 (H) 07/17/2018     Lab Results   Component Value Date    LDLCALC 111 (H) 07/17/2018     Lab Results   Component Value Date    HGBA1C 5 1 12/22/2017       Results for orders placed or performed in visit on 08/21/18   Hepatic function panel   Result Value Ref Range    Total Bilirubin 0 72 0 20 - 1 00 mg/dL    Bilirubin, Direct 0 17 0 00 - 0 20 mg/dL    Alkaline Phosphatase 54 46 - 116 U/L    AST 38 5 - 45 U/L     (H) 12 - 78 U/L    Total Protein 7 7 6 4 - 8 2 g/dL    Albumin 3 7 3 5 - 5 0 g/dL       Orders Placed This Encounter   Procedures    Hepatic function panel    Lipid Panel with Direct LDL reflex       ALLERGIES:  Allergies   Allergen Reactions    Other      seasonal       Current Medications     Current Outpatient Prescriptions   Medication Sig Dispense Refill    albuterol (PROAIR HFA) 90 mcg/act inhaler Inhale 2 puffs every 6 (six) hours as needed      atenolol (TENORMIN) 100 mg tablet TAKE 1 TABLET (100 MG TOTAL) BY MOUTH DAILY 30 tablet 5    benazepril (LOTENSIN) 40 MG tablet Take 1 tablet twice daily  60 tablet 5    esomeprazole (NexIUM) 40 MG capsule Take 40 mg by mouth daily  0    hydrochlorothiazide (HYDRODIURIL) 25 mg tablet Take 1 tablet (25 mg total) by mouth daily 90 tablet 1    loratadine-pseudoephedrine (CLARITIN-D 24 HOUR)  mg per 24 hr tablet Take 1 tablet by mouth daily      sertraline (ZOLOFT) 50 mg tablet Take 1 tablet (50 mg total) by mouth daily 30 tablet 1    atorvastatin (LIPITOR) 20 mg tablet Take 1 tablet (20 mg total) by mouth daily (Patient not taking: Reported on 8/27/2018 ) 30 tablet 3     No current facility-administered medications for this visit            Health Maintenance     Health Maintenance   Topic Date Due    INFLUENZA VACCINE  09/01/2018    Depression Screening PHQ-9  03/26/2019    CRC Screening: Colonoscopy  02/05/2020    DTaP,Tdap,and Td Vaccines (2 - Td) 01/11/2027     Immunization History   Administered Date(s) Administered    Influenza Quadrivalent Preservative Free 3 years and older IM 10/01/2017    Influenza TIV (IM) 01/02/2007, 11/08/2015    Tdap 11/30/2015, 01/11/2017       Cole Wiggins PA-C  8/27/2018 6:19 PM  [facility]

## 2018-08-27 ENCOUNTER — OFFICE VISIT (OUTPATIENT)
Dept: FAMILY MEDICINE CLINIC | Facility: CLINIC | Age: 48
End: 2018-08-27
Payer: COMMERCIAL

## 2018-08-27 VITALS
BODY MASS INDEX: 27.06 KG/M2 | OXYGEN SATURATION: 97 % | HEIGHT: 70 IN | WEIGHT: 189 LBS | HEART RATE: 66 BPM | SYSTOLIC BLOOD PRESSURE: 120 MMHG | DIASTOLIC BLOOD PRESSURE: 86 MMHG

## 2018-08-27 DIAGNOSIS — R79.89 ABNORMAL LFTS: ICD-10-CM

## 2018-08-27 DIAGNOSIS — E78.00 HYPERCHOLESTEROLEMIA: ICD-10-CM

## 2018-08-27 DIAGNOSIS — F41.9 ANXIETY: ICD-10-CM

## 2018-08-27 DIAGNOSIS — I10 ESSENTIAL HYPERTENSION: Primary | ICD-10-CM

## 2018-08-27 PROCEDURE — 3074F SYST BP LT 130 MM HG: CPT | Performed by: PHYSICIAN ASSISTANT

## 2018-08-27 PROCEDURE — 99214 OFFICE O/P EST MOD 30 MIN: CPT | Performed by: PHYSICIAN ASSISTANT

## 2018-08-27 PROCEDURE — 3079F DIAST BP 80-89 MM HG: CPT | Performed by: PHYSICIAN ASSISTANT

## 2018-08-27 NOTE — ASSESSMENT & PLAN NOTE
Improved over the last month  He will continue on atenolol 100 mg daily, benazepril 40 mg twice daily, and hydrochlorothiazide 25 mg daily  He was encouraged to continue to avoid alcohol  He has made positive changes with cutting back on his use of alcohol but has not stopped completely  We will reassess at his next visit in a few months

## 2018-08-27 NOTE — ASSESSMENT & PLAN NOTE
We reviewed that he had a mild elevation of his ALT but otherwise his liver function tests  At overall improved including a normalized AST  He was encouraged to continue to work on discontinuing his alcohol intake completely  He notes that this is not his goal at this time  Will continue to monitor will recheck liver function tests prior to next visit

## 2018-08-27 NOTE — ASSESSMENT & PLAN NOTE
The patient will remain off of the statin for now  He will repeat blood work prior to his next appointment in a few months to recheck his liver enzymes as well as his cholesterol levels  We reviewed that if his liver enzymes continue to normalize with his decrease in alcohol intake, we can then consider restarting the statin

## 2018-08-27 NOTE — PATIENT INSTRUCTIONS
Hypertension   Improved over the last month  He will continue on atenolol 100 mg daily, benazepril 40 mg twice daily, and hydrochlorothiazide 25 mg daily  He was encouraged to continue to avoid alcohol  He has made positive changes with cutting back on his use of alcohol but has not stopped completely  We will reassess at his next visit in a few months  Abnormal LFTs    We reviewed that he had a mild elevation of his ALT but otherwise his liver function tests  At overall improved including a normalized AST  He was encouraged to continue to work on discontinuing his alcohol intake completely  He notes that this is not his goal at this time  Will continue to monitor will recheck liver function tests prior to next visit  Anxiety    The patient feels that his anxiety has improved since restarting back on Zoloft  He will continue with Zoloft 50 milligrams daily  He will continue to work on limiting his alcohol intake, but he was encouraged to discontinue drinking altogether  He will continue to go to his gamblers anonymous meetings but states he does not need to go to the alcoholics anonymous meetings  Hypercholesterolemia    The patient will remain off of the statin for now  He will repeat blood work prior to his next appointment in a few months to recheck his liver enzymes as well as his cholesterol levels  We reviewed that if his liver enzymes continue to normalize with his decrease in alcohol intake, we can then consider restarting the statin

## 2018-08-31 ENCOUNTER — OFFICE VISIT (OUTPATIENT)
Dept: CARDIOLOGY CLINIC | Facility: CLINIC | Age: 48
End: 2018-08-31
Payer: COMMERCIAL

## 2018-08-31 VITALS
DIASTOLIC BLOOD PRESSURE: 90 MMHG | BODY MASS INDEX: 26.86 KG/M2 | HEART RATE: 64 BPM | HEIGHT: 70 IN | SYSTOLIC BLOOD PRESSURE: 116 MMHG | WEIGHT: 187.6 LBS

## 2018-08-31 DIAGNOSIS — I10 ESSENTIAL HYPERTENSION: ICD-10-CM

## 2018-08-31 DIAGNOSIS — E78.00 HYPERCHOLESTEROLEMIA: ICD-10-CM

## 2018-08-31 PROCEDURE — 99243 OFF/OP CNSLTJ NEW/EST LOW 30: CPT | Performed by: INTERNAL MEDICINE

## 2018-08-31 PROCEDURE — 93000 ELECTROCARDIOGRAM COMPLETE: CPT | Performed by: INTERNAL MEDICINE

## 2018-08-31 RX ORDER — PRAVASTATIN SODIUM 20 MG
20 TABLET ORAL
Qty: 90 TABLET | Refills: 3 | Status: SHIPPED | OUTPATIENT
Start: 2018-08-31 | End: 2019-06-04 | Stop reason: SDUPTHER

## 2018-08-31 NOTE — PROGRESS NOTES
Cardiology Consultation     Romel La  [id number]  1970  [address]    Reason for visit: Here for evaluation of HTN  Jennifer Sanabria Also has HLP    1  Essential hypertension  Ambulatory referral to Cardiology    POCT ECG   2  Hypercholesterolemia  Ambulatory referral to Cardiology    pravastatin (PRAVACHOL) 20 mg tablet     HPI: The patient is a 57 yr old male with a hx of HTN and HLP  He comes in for advise of this issue  He denies CP or SOB  He denies edema  He denies lightheadedness or palpitations         Patient Active Problem List   Diagnosis    Allergic rhinitis    Muro's esophagus    Asthma, mild intermittent    Hematuria    Hiatal hernia    Hypercholesterolemia    Hypertension    Impaired fasting glucose    Sarcoidosis    Sleep apnea    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    Anxiety    Abnormal LFTs     Past Medical History:   Diagnosis Date    Chest pain     Sarcoidosis      Social History     Social History    Marital status: /Civil Union     Spouse name: N/A    Number of children: N/A    Years of education: N/A     Occupational History           Social History Main Topics    Smoking status: Former Smoker     Types: Cigarettes     Quit date: 1999    Smokeless tobacco: Former User      Comment: smoked about 1 pack every 2 weeks for 2 years in his 19's     Alcohol use Yes      Comment: Approx 20 beers a week     Drug use: No    Sexual activity: Not on file     Other Topics Concern    Not on file     Social History Narrative    Lives home with wife and daughter       Family History   Problem Relation Age of Onset    Colon cancer Mother         age in 35s   Dina Day Hyperlipidemia Mother     Hyperlipidemia Father     Pancreatic cancer Father         Adenocarcinoma of the ampulla of vater     Colon cancer Maternal Uncle Past Surgical History:   Procedure Laterality Date    COLONOSCOPY      complete     CYSTOSCOPY      Diagnostic - Neg 2007    ESOPHAGOGASTRODUODENOSCOPY      Diagnostic        Current Outpatient Prescriptions:     albuterol (PROAIR HFA) 90 mcg/act inhaler, Inhale 2 puffs every 6 (six) hours as needed, Disp: , Rfl:     atenolol (TENORMIN) 100 mg tablet, TAKE 1 TABLET (100 MG TOTAL) BY MOUTH DAILY, Disp: 30 tablet, Rfl: 5    benazepril (LOTENSIN) 40 MG tablet, Take 1 tablet twice daily  , Disp: 60 tablet, Rfl: 5    esomeprazole (NexIUM) 40 MG capsule, Take 40 mg by mouth daily, Disp: , Rfl: 0    hydrochlorothiazide (HYDRODIURIL) 25 mg tablet, Take 1 tablet (25 mg total) by mouth daily, Disp: 90 tablet, Rfl: 1    loratadine-pseudoephedrine (CLARITIN-D 24 HOUR)  mg per 24 hr tablet, Take 1 tablet by mouth daily, Disp: , Rfl:     sertraline (ZOLOFT) 50 mg tablet, Take 1 tablet (50 mg total) by mouth daily, Disp: 30 tablet, Rfl: 1    pravastatin (PRAVACHOL) 20 mg tablet, Take 1 tablet (20 mg total) by mouth daily at bedtime, Disp: 90 tablet, Rfl: 3  Allergies   Allergen Reactions    Other      seasonal     Vitals:    08/31/18 0904   BP: 116/90   BP Location: Left arm   Patient Position: Sitting   Cuff Size: Adult   Pulse: 64   Weight: 85 1 kg (187 lb 9 6 oz)   Height: 5' 10" (1 778 m)       Review of Systems:  Review of Systems   Constitutional: Negative for fatigue  Respiratory: Negative for shortness of breath  Cardiovascular: Negative for chest pain, palpitations and leg swelling  Gastrointestinal: Positive for diarrhea (occ )  Negative for blood in stool and constipation  Genitourinary: Negative for frequency  Musculoskeletal: Negative for arthralgias and back pain  Neurological: Negative for headaches  Psychiatric/Behavioral: Negative for agitation, behavioral problems, confusion and decreased concentration  The patient is nervous/anxious          Physical Exam:  Vitals:    08/31/18 0904   BP: 116/90   BP Location: Left arm   Patient Position: Sitting   Cuff Size: Adult   Pulse: 64   Weight: 85 1 kg (187 lb 9 6 oz)   Height: 5' 10" (1 778 m)       Physical Exam   Constitutional: He appears well-developed and well-nourished  No distress  HENT:   Head: Normocephalic and atraumatic  Mouth/Throat: No oropharyngeal exudate  Eyes: Conjunctivae are normal    Neck: Neck supple  Normal carotid pulses and no JVD present  Carotid bruit is not present  No thyromegaly present  Cardiovascular: Normal rate, regular rhythm and intact distal pulses  Exam reveals no gallop and no friction rub  No murmur heard  Pulmonary/Chest: Breath sounds normal  He has no wheezes  He has no rhonchi  He has no rales  Abdominal: Soft  He exhibits no mass  There is no hepatosplenomegaly  There is no tenderness  Musculoskeletal: He exhibits no edema  Discussion/Summary:  1  HTN-fairly well controlled on ACE-I,, thiazide, and atenolol  Continue same    No beta blocker intolerance at this time    Would suggest in future if problems with beta blocker    Switch to Bystolic (he was on-not sure why stopped-? Insurance issue) or use amlodipine with ACE-I/thiazide  2  HLP-has high TGs 250 range and high LDL    Risk of MI not >7 5% but isolated LDL last year 191    Would favor statin but use low dose pravastatin    Recheck FLP and liver function tests in October  Pascual Jones NOTE VERY LOW CA SCORE LAST YEAR HENCE NO CAD    FU by phone re tests in October    Otherwise WALDO Roland MD

## 2018-09-21 DIAGNOSIS — F41.9 ANXIETY: ICD-10-CM

## 2018-10-20 DIAGNOSIS — I10 HYPERTENSION, UNSPECIFIED TYPE: ICD-10-CM

## 2018-10-22 RX ORDER — ATENOLOL 100 MG/1
100 TABLET ORAL DAILY
Qty: 30 TABLET | Refills: 5 | Status: SHIPPED | OUTPATIENT
Start: 2018-10-22 | End: 2019-08-03 | Stop reason: SDUPTHER

## 2018-10-31 NOTE — PROGRESS NOTES
[place] [facility]  FAMILY PRACTICE OFFICE VISIT       NAME: Whit Webster  AGE: 57 y o  SEX: male       : 1970        MRN: [id number]    DATE: 2018  TIME: 3:23 PM    Assessment and Plan     Problem List Items Addressed This Visit     Hypercholesterolemia     Patient was recently started on pravastatin 20 mg daily by his cardiologist   We reviewed that his recent cholesterol test done last week showed elevations worse than he has had previously in the past   We discussed the link between his increase in drinking as well as poor dietary choices when he is out of town working  He was encouraged to decrease his intake of fatty foods such as red meat, fried food, cheese, and butter  He will continue for now with a pravastatin 20 mg daily and will recheck labs prior to his next visit in 2 months  We discussed that he may benefit from an increase in the medication or switching to atorvastatin if numbers are not improving with these changes  Relevant Orders    Lipid Panel with Direct LDL reflex    Hepatic function panel    Hypertension - Primary     I reviewed with the patient that his blood pressure is borderline elevated today  He was encouraged to continue with his atenolol 100 mg daily, benazepril 40 mg daily, and hydrochlorothiazide 25 mg daily  He is intent on cutting back on his alcohol intake again which has previously been helpful with his blood pressure control  He was encouraged also be cognizant of his dietary intake and continue to limit caffeine and salt  He will return in 2 months for recheck  We reviewed that if his blood pressure is elevated when he returns, we will need to increase his medication if this is necessary, we could increase his benazepril to twice a day  Anxiety     Patient states that he does not feel that the Zoloft is doing a whole lot for him at this point    I do believe that a lot of his current attention is related to his increase in drinking and the effects that is has been having on his job as well as home life  He was encouraged to stop drinking alcohol  He is not interested in increasing the Zoloft at this time so he will continue with the 50 mg daily for now  He will be seeing a therapist in the next week whom he has seen in the past for his gambling addictions  We will reassess at the next visit in 2 months  He was encouraged to call with any problems or concerns in the interim  Abnormal LFTs     We reviewed that his LFTs have increased again  It is possible that this is related to starting pravastatin but more likely is related to his significant increase in alcohol intake again  He was again encouraged to stop drinking altogether  He is not interested in going to any AA meeting at this time  He is willing though to see a therapist and states that he will be see them in the next week  Was given a slip to get an ultrasound of his liver as well as a slip to recheck his liver function test prior to his next visit in 2 months  Relevant Orders    US right upper quadrant    Hepatic function panel          Hypertension  I reviewed with the patient that his blood pressure is borderline elevated today  He was encouraged to continue with his atenolol 100 mg daily, benazepril 40 mg daily, and hydrochlorothiazide 25 mg daily  He is intent on cutting back on his alcohol intake again which has previously been helpful with his blood pressure control  He was encouraged also be cognizant of his dietary intake and continue to limit caffeine and salt  He will return in 2 months for recheck  We reviewed that if his blood pressure is elevated when he returns, we will need to increase his medication if this is necessary, we could increase his benazepril to twice a day  Abnormal LFTs  We reviewed that his LFTs have increased again    It is possible that this is related to starting pravastatin but more likely is related to his significant increase in alcohol intake again  He was again encouraged to stop drinking altogether  He is not interested in going to any AA meeting at this time  He is willing though to see a therapist and states that he will be see them in the next week  Was given a slip to get an ultrasound of his liver as well as a slip to recheck his liver function test prior to his next visit in 2 months  Anxiety  Patient states that he does not feel that the Zoloft is doing a whole lot for him at this point  I do believe that a lot of his current attention is related to his increase in drinking and the effects that is has been having on his job as well as home life  He was encouraged to stop drinking alcohol  He is not interested in increasing the Zoloft at this time so he will continue with the 50 mg daily for now  He will be seeing a therapist in the next week whom he has seen in the past for his gambling addictions  We will reassess at the next visit in 2 months  He was encouraged to call with any problems or concerns in the interim  Hypercholesterolemia  Patient was recently started on pravastatin 20 mg daily by his cardiologist   We reviewed that his recent cholesterol test done last week showed elevations worse than he has had previously in the past   We discussed the link between his increase in drinking as well as poor dietary choices when he is out of town working  He was encouraged to decrease his intake of fatty foods such as red meat, fried food, cheese, and butter  He will continue for now with a pravastatin 20 mg daily and will recheck labs prior to his next visit in 2 months  We discussed that he may benefit from an increase in the medication or switching to atorvastatin if numbers are not improving with these changes            Chief Complaint     Chief Complaint   Patient presents with    Follow-up     HTN and review labs       History of Present Illness   Chiki Anaya is a 57 y o -year-old male who presents for 2 month follow-up  The patient reports that current blood pressure medications include atenolol 100 mg daily, benazepril 40 mg twice daily, and hydrochlorothiazide 25 mg daily  Blood pressure readings at home are about 130/high 80s-low 90s  The patient denies symptoms of poor control such as chest pain, shortness of breath, leg swelling, vision changes, headaches, or dizziness  The patient reports 1 cup of coffee daily caffeine intake and limited salt intake  The patient reports that recently anxiety level has been worse - feeling more tense lately - having trouble at home and was spoken to about an episode at work  He is setting up with a therapist   Daily medication includes Zoloft 50 mg daily  The patient denies panic attacks  The patient denies SI/HI  (doing [place] meetings - no AA)    The patient has been off of statin for now given LFT elevations but notes that he was started on pravastatin 20 mg 2 months ago by Cardiology  Last LDL was 177 in last week  The patient denies myalgias  The patient has had ongoing mild ALT elevation - notes that his alcohol intake is about 6 beers 3 times a week - most recent repeat labs showed increase in AST and ALT          Review of Systems   Review of Systems   Respiratory: Negative for shortness of breath  Cardiovascular: Negative for chest pain, palpitations and leg swelling  Gastrointestinal: Positive for abdominal distention (sometimes bloated feeling) and abdominal pain (LLQ)  Negative for diarrhea, nausea and vomiting  Neurological: Negative for dizziness and headaches  Psychiatric/Behavioral: Negative for dysphoric mood  The patient is nervous/anxious (tense)          Active Problem List     Patient Active Problem List   Diagnosis    Allergic rhinitis    Muro's esophagus    Asthma, mild intermittent    Hematuria    Hiatal hernia    Hypercholesterolemia    Hypertension    Impaired fasting glucose  Sarcoidosis    Sleep apnea    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    Anxiety    Abnormal LFTs         Past Medical History:  Past Medical History:   Diagnosis Date    Chest pain     Sarcoidosis        Past Surgical History:  Past Surgical History:   Procedure Laterality Date    COLONOSCOPY      complete     CYSTOSCOPY      Diagnostic - Neg 2007    ESOPHAGOGASTRODUODENOSCOPY      Diagnostic        Family History:  Family History   Problem Relation Age of Onset    Colon cancer Mother         age in 35s   [place] Hyperlipidemia Mother     Hyperlipidemia Father     Pancreatic cancer Father         Adenocarcinoma of the ampulla of vater     Colon cancer Maternal Uncle        Social History:  Social History     Social History    Marital status: /Civil Union     Spouse name: N/A    Number of children: N/A    Years of education: N/A     Occupational History           Social History Main Topics    Smoking status: Former Smoker     Types: Cigarettes     Quit date: 1999    Smokeless tobacco: Former User      Comment: smoked about 1 pack every 2 weeks for 2 years in his 19's     Alcohol use Yes      Comment: Approx 20 beers a week     Drug use: No    Sexual activity: Not on file     Other Topics Concern    Not on file     Social History Narrative    Lives home with wife and daughter        Objective     Vitals:    11/05/18 1420   BP: 132/90   Pulse: 82   Resp: 18   SpO2: 96%   Weight: 86 kg (189 lb 8 oz)   Height: 5' 10" (1 778 m)     Wt Readings from Last 3 Encounters:   11/05/18 86 kg (189 lb 8 oz)   08/31/18 85 1 kg (187 lb 9 6 oz)   08/27/18 85 7 kg (189 lb)       Physical Exam   Constitutional: He appears well-developed and well-nourished  HENT:   Head: Normocephalic and atraumatic  Neck: Neck supple  No thyromegaly present  Cardiovascular: Normal rate, regular rhythm, normal heart sounds and intact distal pulses  No murmur heard    No carotid bruits noted   Pulmonary/Chest: Effort normal and breath sounds normal  He has no wheezes  He has no rales  Abdominal: Soft  Bowel sounds are normal  He exhibits no distension and no mass  There is no tenderness  There is no guarding  Musculoskeletal: He exhibits no edema  Lymphadenopathy:     He has no cervical adenopathy  Neurological: He is alert     Psychiatric:   Dysphoric        Pertinent Laboratory/Diagnostic Studies:  Lab Results   Component Value Date    GLUCOSE 99 07/25/2014    BUN 10 07/17/2018    CREATININE 0 82 07/17/2018    CALCIUM 9 0 07/17/2018     07/25/2014    K 3 8 07/17/2018    CO2 27 07/17/2018     (H) 07/17/2018     Lab Results   Component Value Date     (H) 11/01/2018    AST 93 (H) 11/01/2018    ALKPHOS 50 11/01/2018    BILITOT 0 66 07/25/2014       Lab Results   Component Value Date    WBC 7 82 07/25/2014    HGB 16 6 07/25/2014    HCT 47 6 07/25/2014    MCV 88 07/25/2014     07/25/2014     Lab Results   Component Value Date    CHOL 239 07/25/2014     Lab Results   Component Value Date    TRIG 322 (H) 11/01/2018     Lab Results   Component Value Date    HDL 54 11/01/2018     Lab Results   Component Value Date    LDLCALC 177 (H) 11/01/2018     Lab Results   Component Value Date    HGBA1C 5 1 12/22/2017       Results for orders placed or performed in visit on 11/01/18   Hepatic function panel   Result Value Ref Range    Total Bilirubin 0 87 0 20 - 1 00 mg/dL    Bilirubin, Direct 0 28 (H) 0 00 - 0 20 mg/dL    Alkaline Phosphatase 50 46 - 116 U/L    AST 93 (H) 5 - 45 U/L     (H) 12 - 78 U/L    Total Protein 8 0 6 4 - 8 2 g/dL    Albumin 4 1 3 5 - 5 0 g/dL   Lipid Panel with Direct LDL reflex   Result Value Ref Range    Cholesterol 295 (H) 50 - 200 mg/dL    Triglycerides 322 (H) <=150 mg/dL    HDL, Direct 54 40 - 60 mg/dL    LDL Calculated 177 (H) 0 - 100 mg/dL       Orders Placed This Encounter   Procedures    US right upper quadrant    Lipid Panel with Direct LDL reflex    Hepatic function panel       ALLERGIES:  Allergies   Allergen Reactions    Other      seasonal       Current Medications     Current Outpatient Prescriptions   Medication Sig Dispense Refill    albuterol (PROAIR HFA) 90 mcg/act inhaler Inhale 2 puffs every 6 (six) hours as needed      atenolol (TENORMIN) 100 mg tablet TAKE 1 TABLET (100 MG TOTAL) BY MOUTH DAILY 30 tablet 5    benazepril (LOTENSIN) 40 MG tablet Take 1 tablet twice daily  60 tablet 5    esomeprazole (NexIUM) 40 MG capsule Take 40 mg by mouth daily  0    hydrochlorothiazide (HYDRODIURIL) 25 mg tablet Take 1 tablet (25 mg total) by mouth daily 90 tablet 1    loratadine-pseudoephedrine (CLARITIN-D 24 HOUR)  mg per 24 hr tablet Take 1 tablet by mouth daily      pravastatin (PRAVACHOL) 20 mg tablet Take 1 tablet (20 mg total) by mouth daily at bedtime 90 tablet 3    sertraline (ZOLOFT) 50 mg tablet TAKE 1 TABLET BY MOUTH EVERY DAY 30 tablet 1     No current facility-administered medications for this visit            Health Maintenance     Health Maintenance   Topic Date Due    Depression Screening PHQ  08/27/2019    CRC Screening: Colonoscopy  02/05/2020    DTaP,Tdap,and Td Vaccines (2 - Td) 01/11/2027    INFLUENZA VACCINE  Completed     Immunization History   Administered Date(s) Administered    Influenza 10/12/2018    Influenza Quadrivalent Preservative Free 3 years and older IM 10/01/2017    Influenza TIV (IM) 01/02/2007, 11/08/2015    Tdap 11/30/2015, 01/11/2017       Kiley Gonzalez PA-C  11/5/2018 3:23 PM  [facility]

## 2018-11-01 ENCOUNTER — APPOINTMENT (OUTPATIENT)
Dept: LAB | Facility: MEDICAL CENTER | Age: 48
End: 2018-11-01
Payer: COMMERCIAL

## 2018-11-01 DIAGNOSIS — E78.00 HYPERCHOLESTEROLEMIA: ICD-10-CM

## 2018-11-01 DIAGNOSIS — R79.89 ABNORMAL LFTS: ICD-10-CM

## 2018-11-01 LAB
ALBUMIN SERPL BCP-MCNC: 4.1 G/DL (ref 3.5–5)
ALP SERPL-CCNC: 50 U/L (ref 46–116)
ALT SERPL W P-5'-P-CCNC: 150 U/L (ref 12–78)
AST SERPL W P-5'-P-CCNC: 93 U/L (ref 5–45)
BILIRUB DIRECT SERPL-MCNC: 0.28 MG/DL (ref 0–0.2)
BILIRUB SERPL-MCNC: 0.87 MG/DL (ref 0.2–1)
CHOLEST SERPL-MCNC: 295 MG/DL (ref 50–200)
HDLC SERPL-MCNC: 54 MG/DL (ref 40–60)
LDLC SERPL CALC-MCNC: 177 MG/DL (ref 0–100)
PROT SERPL-MCNC: 8 G/DL (ref 6.4–8.2)
TRIGL SERPL-MCNC: 322 MG/DL

## 2018-11-01 PROCEDURE — 80076 HEPATIC FUNCTION PANEL: CPT

## 2018-11-01 PROCEDURE — 80061 LIPID PANEL: CPT

## 2018-11-01 PROCEDURE — 36415 COLL VENOUS BLD VENIPUNCTURE: CPT

## 2018-11-05 ENCOUNTER — OFFICE VISIT (OUTPATIENT)
Dept: FAMILY MEDICINE CLINIC | Facility: CLINIC | Age: 48
End: 2018-11-05
Payer: COMMERCIAL

## 2018-11-05 VITALS
HEIGHT: 70 IN | SYSTOLIC BLOOD PRESSURE: 132 MMHG | RESPIRATION RATE: 18 BRPM | HEART RATE: 82 BPM | OXYGEN SATURATION: 96 % | BODY MASS INDEX: 27.13 KG/M2 | WEIGHT: 189.5 LBS | DIASTOLIC BLOOD PRESSURE: 90 MMHG

## 2018-11-05 DIAGNOSIS — I10 ESSENTIAL HYPERTENSION: Primary | ICD-10-CM

## 2018-11-05 DIAGNOSIS — E78.00 HYPERCHOLESTEROLEMIA: ICD-10-CM

## 2018-11-05 DIAGNOSIS — F41.9 ANXIETY: ICD-10-CM

## 2018-11-05 DIAGNOSIS — R79.89 ABNORMAL LFTS: ICD-10-CM

## 2018-11-05 PROCEDURE — 1036F TOBACCO NON-USER: CPT | Performed by: PHYSICIAN ASSISTANT

## 2018-11-05 PROCEDURE — 99214 OFFICE O/P EST MOD 30 MIN: CPT | Performed by: PHYSICIAN ASSISTANT

## 2018-11-05 PROCEDURE — 3008F BODY MASS INDEX DOCD: CPT | Performed by: PHYSICIAN ASSISTANT

## 2018-11-05 NOTE — ASSESSMENT & PLAN NOTE
We reviewed that his LFTs have increased again  It is possible that this is related to starting pravastatin but more likely is related to his significant increase in alcohol intake again  He was again encouraged to stop drinking altogether  He is not interested in going to any AA meeting at this time  He is willing though to see a therapist and states that he will be see them in the next week  Was given a slip to get an ultrasound of his liver as well as a slip to recheck his liver function test prior to his next visit in 2 months

## 2018-11-05 NOTE — ASSESSMENT & PLAN NOTE
I reviewed with the patient that his blood pressure is borderline elevated today  He was encouraged to continue with his atenolol 100 mg daily, benazepril 40 mg daily, and hydrochlorothiazide 25 mg daily  He is intent on cutting back on his alcohol intake again which has previously been helpful with his blood pressure control  He was encouraged also be cognizant of his dietary intake and continue to limit caffeine and salt  He will return in 2 months for recheck  We reviewed that if his blood pressure is elevated when he returns, we will need to increase his medication if this is necessary, we could increase his benazepril to twice a day

## 2018-11-05 NOTE — ASSESSMENT & PLAN NOTE
Patient was recently started on pravastatin 20 mg daily by his cardiologist   We reviewed that his recent cholesterol test done last week showed elevations worse than he has had previously in the past   We discussed the link between his increase in drinking as well as poor dietary choices when he is out of town working  He was encouraged to decrease his intake of fatty foods such as red meat, fried food, cheese, and butter  He will continue for now with a pravastatin 20 mg daily and will recheck labs prior to his next visit in 2 months  We discussed that he may benefit from an increase in the medication or switching to atorvastatin if numbers are not improving with these changes

## 2018-11-05 NOTE — ASSESSMENT & PLAN NOTE
Patient states that he does not feel that the Zoloft is doing a whole lot for him at this point  I do believe that a lot of his current attention is related to his increase in drinking and the effects that is has been having on his job as well as home life  He was encouraged to stop drinking alcohol  He is not interested in increasing the Zoloft at this time so he will continue with the 50 mg daily for now  He will be seeing a therapist in the next week whom he has seen in the past for his gambling addictions  We will reassess at the next visit in 2 months  He was encouraged to call with any problems or concerns in the interim

## 2018-11-10 DIAGNOSIS — I10 ESSENTIAL HYPERTENSION: ICD-10-CM

## 2018-11-10 RX ORDER — HYDROCHLOROTHIAZIDE 25 MG/1
TABLET ORAL
Qty: 90 TABLET | Refills: 1 | Status: SHIPPED | OUTPATIENT
Start: 2018-11-10 | End: 2019-03-05 | Stop reason: SDUPTHER

## 2018-12-07 DIAGNOSIS — F41.9 ANXIETY: ICD-10-CM

## 2019-01-25 ENCOUNTER — HOSPITAL ENCOUNTER (OUTPATIENT)
Dept: ULTRASOUND IMAGING | Facility: HOSPITAL | Age: 49
Discharge: HOME | End: 2019-01-25
Payer: COMMERCIAL

## 2019-01-25 DIAGNOSIS — R79.89 ABNORMAL LFTS: ICD-10-CM

## 2019-01-25 PROCEDURE — 76705 ECHO EXAM OF ABDOMEN: CPT

## 2019-02-06 ENCOUNTER — OFFICE VISIT (OUTPATIENT)
Dept: FAMILY MEDICINE CLINIC | Facility: CLINIC | Age: 49
End: 2019-02-06
Payer: COMMERCIAL

## 2019-02-06 VITALS
BODY MASS INDEX: 27.35 KG/M2 | HEIGHT: 70 IN | HEART RATE: 77 BPM | OXYGEN SATURATION: 96 % | TEMPERATURE: 98.8 F | SYSTOLIC BLOOD PRESSURE: 116 MMHG | DIASTOLIC BLOOD PRESSURE: 82 MMHG | WEIGHT: 191 LBS

## 2019-02-06 DIAGNOSIS — J06.9 VIRAL UPPER RESPIRATORY TRACT INFECTION: Primary | ICD-10-CM

## 2019-02-06 PROCEDURE — 99213 OFFICE O/P EST LOW 20 MIN: CPT | Performed by: FAMILY MEDICINE

## 2019-02-06 PROCEDURE — 3008F BODY MASS INDEX DOCD: CPT | Performed by: FAMILY MEDICINE

## 2019-02-06 RX ORDER — PROMETHAZINE HYDROCHLORIDE AND CODEINE PHOSPHATE 6.25; 1 MG/5ML; MG/5ML
SYRUP ORAL
Qty: 120 ML | Refills: 0 | Status: SHIPPED | OUTPATIENT
Start: 2019-02-06 | End: 2019-03-01

## 2019-02-06 NOTE — LETTER
February 6, 2019     Patient: Jerad Levy   YOB: 1970   Date of Visit: 2/6/2019       To Whom it May Concern:    Jerad Levy is under my professional care  He was seen in my office on 2/6/2019  Please excuse patient from 02/04/2019 to 02/07/2019 He may return to work on 02/08/2019  If you have any questions or concerns, please don't hesitate to call           Sincerely,          Angela Call MD

## 2019-02-06 NOTE — LETTER
February 6, 2019     Patient: Zully Yoder   YOB: 1970   Date of Visit: 2/6/2019       To Whom it May Concern:    Zully Yoder is under my professional care  He was seen in my office on 2/6/2019  Please excuse from 02/04/2019 He may return to work on 02/08/2019  If you have any questions or concerns, please don't hesitate to call           Sincerely,          Roni Brunner MD

## 2019-02-06 NOTE — PROGRESS NOTES
Assessment/Plan:    He has viral upper respiratory infection  I recommended increased hydration with water and use of Mucinex D in the morning  I have given a prescription for promethazine with codeine for nighttime coughing  He should contact us if symptoms are not improving over the next few days  The eye     Diagnoses and all orders for this visit:    Viral upper respiratory tract infection  -     promethazine-codeine (PHENERGAN WITH CODEINE) 6 25-10 mg/5 mL syrup; Take 1 tsp p o  Q h s  P r n  Cough          Subjective:      Patient ID: Rufus Soni is a 57 y o  male  Here with cold sx  Started 2 weeks ago with sore throat and coughing fit test   Symptoms worsened 3 or 4 days ago  Cough productive of yellow mucus  Cough has been keeping him up at night in spite of taking nighttime cold medication  Lots of nasal congestion and postnasal drainage  No fever or chills  The following portions of the patient's history were reviewed and updated as appropriate: allergies, current medications, past family history, past medical history, past social history, past surgical history and problem list     Review of Systems   Constitutional: Positive for fatigue  HENT: Positive for congestion, postnasal drip and voice change  Negative for ear discharge and sore throat  Respiratory: Positive for cough  Gastrointestinal: Negative for abdominal pain, constipation and diarrhea  Objective:      /82 (BP Location: Left arm, Patient Position: Sitting, Cuff Size: Standard)   Pulse 77   Temp 98 8 °F (37 1 °C)   Ht 5' 10" (1 778 m)   Wt 86 6 kg (191 lb)   SpO2 96%   BMI 27 41 kg/m²          Physical Exam   Constitutional: He is oriented to person, place, and time  He appears well-developed and well-nourished  HENT:   Head: Normocephalic and atraumatic     Right Ear: External ear normal    Left Ear: External ear normal    Mouth/Throat: Oropharynx is clear and moist    Nose with mucosal edema and clear rhinorrhea  Neck: Normal range of motion  Neck supple  No thyromegaly present  Cardiovascular: Normal rate, regular rhythm and normal heart sounds  Pulmonary/Chest: Effort normal and breath sounds normal    Frequent cough   Musculoskeletal: Normal range of motion  Lymphadenopathy:     He has no cervical adenopathy  Neurological: He is alert and oriented to person, place, and time  Nursing note and vitals reviewed

## 2019-02-07 DIAGNOSIS — F41.9 ANXIETY: ICD-10-CM

## 2019-02-24 NOTE — PROGRESS NOTES
ADULT ANNUAL PHYSICAL  [facility]    NAME: Moreno Trent  AGE: 54 y o  SEX: male  : 1970     DATE: 3/1/2019     Assessment and Plan:     Problem List Items Addressed This Visit        Endocrine    Impaired fasting glucose     Recheck with labs prior to next visit  Relevant Orders    Comprehensive metabolic panel    Lipid Panel with Direct LDL reflex    Hemoglobin A1C       Respiratory    Asthma, mild intermittent     Stable  Continue albuterol as needed  Sleep apnea     Patient was encouraged to use CPAP  Emphasized that the need for the machine is for himself not for sleeping partner  Cardiovascular and Mediastinum    Hypertension     Well controlled  He will continue atenolol 100 mg daily, benazepril 40 mg twice daily, and hydrochlorothiazide 25 mg daily  Relevant Medications    benazepril (LOTENSIN) 40 MG tablet    Other Relevant Orders    Comprehensive metabolic panel    Lipid Panel with Direct LDL reflex    Hemoglobin A1C       Other    Hypercholesterolemia     Currently on pravastatin 20 mg daily but not well controlled  He was encouraged to decrease his intake of fatty food and to avoid alcohol  Will recheck with labs prior to next visit  Relevant Orders    Comprehensive metabolic panel    Lipid Panel with Direct LDL reflex    Hemoglobin A1C    Sarcoidosis     Patient has not seen pulmonology in probably over 5 years  He was encouraged to follow up with them and provided referral for this  Relevant Orders    Ambulatory referral to Pulmonology    Anxiety     Stable  He will continue Zoloft 50 mg daily  Will continue to monitor  He was encouraged to restart seeing his therapist          Abnormal LFTs     Recheck prior to next visit  Reviewed his ultrasound that showed fatty liver           Relevant Orders    Comprehensive metabolic panel    Lipid Panel with Direct LDL reflex    Hemoglobin A1C      Other Visit Diagnoses     Annual physical exam    -  Primary          Health maintenance and preventative care screenings were discussed with patient today  Appropriate education was printed on patient's after visit summary  · Discussed risks/benefits of screening for colorectal cancer, high cholesterol and diabetes  Patient is up-to-date with their preventive screenings  · Immunizations were reviewed: patient is up-to-date with his immunizations  Counseling:  Dental Health: discussed importance of regular tooth brushing, flossing, and dental visits  BMI Counseling: Body mass index is 26 47 kg/m²  Discussed the patient's BMI with him  The BMI is above average  BMI counseling and education was provided to the patient  Nutrition recommendations include decreasing overall calorie intake  · Alcohol/drug use: discussed avoiding alcohol intake  Return in about 3 months (around 6/1/2019) for Recheck  Chief Complaint:     Chief Complaint   Patient presents with    Annual Exam    Follow-up     HTN      History of Present Illness:     Adult Annual Physical   Patient here for a comprehensive physical exam  The patient reports problems - as below  The patient reports that current blood pressure medications include atenolol 100 mg daily, benazepril 40 mg daily, and hydrochlorothiazide 25 mg daily  Blood pressure readings at the store are usually 110s/80s  The patient denies symptoms of poor control such as chest pain, shortness of breath, leg swelling, vision changes, headaches, or dizziness  The patient reports limited caffeine intake (switching to tea from coffee)  The patient reports that recently anxiety level has been stable and well-controlled  Daily medication includes Zoloft 50 mg daily  Care team does not include a therapist - not been there in 2 months  The patient denies panic attacks  The patient denies SI/HI  Today's PHQ2 score was 0      The patient continues with the pravastatin 20 milligrams daily  Last LDL was 149 yesterday  The patient denies myalgias  He has had recurrent issues with elevated LFTs  We had discussed previously that it was likely due to his alcohol intake but was also not helped by taking pravastatin  He has been urged repeatedly to stop drinking altogether and has previously not been interested in going to in a alcoholic anonymous meetings  At his last visit, he stated that he was going to be seeing a therapist in about a week  He notes that this has been not used in the last 2 months or so - will restart since was helping - notes that stopped when served with divorce papers  Since his last visit, he had ultrasound of the liver and repeat labs, which showed a fatty liver on ultrasound  He notes that he was here a few weeks ago and saw Dr David Bedoya due to coughing a lot  He still has throat irritation  He has been continuing to improve and drinks tea as needed  Diet and Physical Activity  · Diet/Nutrition: well balanced diet  · Weight concerns: patient is overweight (BMI 25 0-29 9)  · Exercise: no formal exercise  Depression Screening  PHQ-9 Depression Screening    PHQ-9:    Frequency of the following problems over the past two weeks:       Little interest or pleasure in doing things:  0 - not at all  Feeling down, depressed, or hopeless:  0 - not at all  PHQ-2 Score:  0       General Health  · Sleep: about 5-7 hours a night - feels rested in AM    · Hearing: normal - bilateral   · Vision: goes for regular eye exams and wears glasses  · Dental: no dental visits for >1 year   Health  · Erectile dysfunction: no   · No self testicular exams     Review of Systems:     Review of Systems   Constitutional: Negative for chills and fever  HENT: Positive for congestion (improving) and sore throat (improving )  Eyes: Negative for visual disturbance  Respiratory: Negative for cough, shortness of breath and wheezing  Cardiovascular: Negative for chest pain, palpitations and leg swelling  Gastrointestinal: Negative for abdominal pain, blood in stool, constipation, diarrhea, nausea and vomiting  Endocrine: Negative for polydipsia and polyuria  Genitourinary: Negative for dysuria and frequency  Musculoskeletal: Negative for arthralgias and myalgias  Gets cramps in the calves and hamstrings   Skin: Negative for rash  Neurological: Negative for dizziness, syncope and headaches  Hematological: Does not bruise/bleed easily  Psychiatric/Behavioral: Negative for dysphoric mood and suicidal ideas  The patient is not nervous/anxious         Past Medical History:     Past Medical History:   Diagnosis Date    Chest pain     Sarcoidosis       Past Surgical History:     Past Surgical History:   Procedure Laterality Date    BRONCHOSCOPY      COLONOSCOPY      complete     CYSTOSCOPY      Diagnostic - Neg     ESOPHAGOGASTRODUODENOSCOPY      Diagnostic       Social History:     Social History     Socioeconomic History    Marital status: /Civil Union     Spouse name: None    Number of children: None    Years of education: None    Highest education level: None   Occupational History    Occupation:     Social Needs    Financial resource strain: None    Food insecurity:     Worry: None     Inability: None    Transportation needs:     Medical: None     Non-medical: None   Tobacco Use    Smoking status: Former Smoker     Types: Cigarettes     Last attempt to quit:      Years since quittin 1    Smokeless tobacco: Former User    Tobacco comment: smoked about 1 pack every 2 weeks for 2 years in his 21's    Substance and Sexual Activity    Alcohol use: Yes     Comment: reports down to 5 drinks once weekly    Drug use: No    Sexual activity: None   Lifestyle    Physical activity:     Days per week: None     Minutes per session: None    Stress: None   Relationships    Social connections: Talks on phone: None     Gets together: None     Attends Sikh service: None     Active member of club or organization: None     Attends meetings of clubs or organizations: None     Relationship status: None    Intimate partner violence:     Fear of current or ex partner: None     Emotionally abused: None     Physically abused: None     Forced sexual activity: None   Other Topics Concern    None   Social History Narrative    Lives home with wife and daughter       Family History:     Family History   Problem Relation Age of Onset    Colon cancer Mother         age in 35s   [place] Hyperlipidemia Mother     Hyperlipidemia Father     Pancreatic cancer Father         Adenocarcinoma of the ampulla of vater     Colon cancer Maternal Uncle     Colon cancer Other         maternal relatives - likely MGF and a second cousin in her 35s      Current Medications:     Current Outpatient Medications   Medication Sig Dispense Refill    albuterol (PROAIR HFA) 90 mcg/act inhaler Inhale 2 puffs every 6 (six) hours as needed      atenolol (TENORMIN) 100 mg tablet TAKE 1 TABLET (100 MG TOTAL) BY MOUTH DAILY 30 tablet 5    benazepril (LOTENSIN) 40 MG tablet Take 1 tablet twice daily  60 tablet 5    esomeprazole (NexIUM) 40 MG capsule Take 40 mg by mouth 2 (two) times a day    0    hydrochlorothiazide (HYDRODIURIL) 25 mg tablet TAKE 1 TABLET BY MOUTH EVERY DAY (25MG TOTAL) 90 tablet 1    loratadine-pseudoephedrine (CLARITIN-D 24 HOUR)  mg per 24 hr tablet Take 1 tablet by mouth daily as needed      Milk Thistle 250 MG CAPS Take 1 caplet by mouth daily      pravastatin (PRAVACHOL) 20 mg tablet Take 1 tablet (20 mg total) by mouth daily at bedtime 90 tablet 3    sertraline (ZOLOFT) 50 mg tablet TAKE 1 TABLET BY MOUTH EVERY DAY 30 tablet 1     No current facility-administered medications for this visit  Allergies:      Allergies   Allergen Reactions    Other      seasonal      Objective:     /88 (BP Location: Left arm, Patient Position: Sitting, Cuff Size: Standard)   Pulse 72   Temp 97 8 °F (36 6 °C)   Ht 5' 10 1" (1 781 m)   Wt 83 9 kg (185 lb)   SpO2 98%   BMI 26 47 kg/m²     Physical Exam   Constitutional: He appears well-developed and well-nourished  No distress  HENT:   Head: Normocephalic and atraumatic  Right Ear: External ear normal    Left Ear: External ear normal    Nose: Nose normal    Mouth/Throat: Oropharynx is clear and moist    Eyes: Pupils are equal, round, and reactive to light  Conjunctivae are normal    Neck: Normal range of motion  Neck supple  No thyromegaly present  Cardiovascular: Normal rate, regular rhythm, normal heart sounds and intact distal pulses  No murmur heard  Pulmonary/Chest: Effort normal and breath sounds normal  He has no wheezes  He has no rales  Abdominal: Soft  Bowel sounds are normal  He exhibits no distension and no mass  There is no tenderness  Musculoskeletal: He exhibits no edema  Lymphadenopathy:     He has no cervical adenopathy  Neurological: He is alert  He has normal strength  No sensory deficit  Skin: Skin is warm and dry  No rash noted  Psychiatric: He has a normal mood and affect  His behavior is normal    Vitals reviewed         Health Maintenance:     Health Maintenance   Topic Date Due    BMI: Followup Plan  02/21/1990    Depression Screening PHQ  08/27/2019    CRC Screening: Colonoscopy  02/05/2020    BMI: Adult  03/01/2020    DTaP,Tdap,and Td Vaccines (3 - Td) 01/11/2027    INFLUENZA VACCINE  Completed    HEPATITIS B VACCINES  Aged Out     Immunization History   Administered Date(s) Administered    INFLUENZA 10/12/2018    Influenza Quadrivalent Preservative Free 3 years and older IM 10/01/2017    Influenza TIV (IM) 01/02/2007, 11/08/2015    Tdap 11/30/2015, 01/11/2017       [facility]

## 2019-02-28 ENCOUNTER — APPOINTMENT (OUTPATIENT)
Dept: LAB | Facility: MEDICAL CENTER | Age: 49
End: 2019-02-28
Payer: COMMERCIAL

## 2019-02-28 DIAGNOSIS — R79.89 ABNORMAL LFTS: ICD-10-CM

## 2019-02-28 DIAGNOSIS — E78.00 HYPERCHOLESTEROLEMIA: ICD-10-CM

## 2019-02-28 LAB
ALBUMIN SERPL BCP-MCNC: 3.9 G/DL (ref 3.5–5)
ALP SERPL-CCNC: 48 U/L (ref 46–116)
ALT SERPL W P-5'-P-CCNC: 178 U/L (ref 12–78)
AST SERPL W P-5'-P-CCNC: 90 U/L (ref 5–45)
BILIRUB DIRECT SERPL-MCNC: 0.19 MG/DL (ref 0–0.2)
BILIRUB SERPL-MCNC: 0.67 MG/DL (ref 0.2–1)
CHOLEST SERPL-MCNC: 280 MG/DL (ref 50–200)
HDLC SERPL-MCNC: 38 MG/DL (ref 40–60)
LDLC SERPL DIRECT ASSAY-MCNC: 149 MG/DL (ref 0–100)
PROT SERPL-MCNC: 7.9 G/DL (ref 6.4–8.2)
TRIGL SERPL-MCNC: 711 MG/DL

## 2019-02-28 PROCEDURE — 80076 HEPATIC FUNCTION PANEL: CPT

## 2019-02-28 PROCEDURE — 80061 LIPID PANEL: CPT

## 2019-02-28 PROCEDURE — 83721 ASSAY OF BLOOD LIPOPROTEIN: CPT

## 2019-02-28 PROCEDURE — 36415 COLL VENOUS BLD VENIPUNCTURE: CPT

## 2019-03-01 ENCOUNTER — OFFICE VISIT (OUTPATIENT)
Dept: FAMILY MEDICINE CLINIC | Facility: CLINIC | Age: 49
End: 2019-03-01
Payer: COMMERCIAL

## 2019-03-01 VITALS
HEART RATE: 72 BPM | OXYGEN SATURATION: 98 % | HEIGHT: 70 IN | SYSTOLIC BLOOD PRESSURE: 108 MMHG | DIASTOLIC BLOOD PRESSURE: 88 MMHG | TEMPERATURE: 97.8 F | BODY MASS INDEX: 26.48 KG/M2 | WEIGHT: 185 LBS

## 2019-03-01 DIAGNOSIS — F41.9 ANXIETY: ICD-10-CM

## 2019-03-01 DIAGNOSIS — R73.01 IMPAIRED FASTING GLUCOSE: ICD-10-CM

## 2019-03-01 DIAGNOSIS — I10 ESSENTIAL HYPERTENSION: ICD-10-CM

## 2019-03-01 DIAGNOSIS — R79.89 ABNORMAL LFTS: ICD-10-CM

## 2019-03-01 DIAGNOSIS — D86.9 SARCOIDOSIS: ICD-10-CM

## 2019-03-01 DIAGNOSIS — Z00.00 ANNUAL PHYSICAL EXAM: Primary | ICD-10-CM

## 2019-03-01 DIAGNOSIS — J45.20 MILD INTERMITTENT ASTHMA WITHOUT COMPLICATION: ICD-10-CM

## 2019-03-01 DIAGNOSIS — G47.30 SLEEP APNEA, UNSPECIFIED TYPE: ICD-10-CM

## 2019-03-01 DIAGNOSIS — E78.00 HYPERCHOLESTEROLEMIA: ICD-10-CM

## 2019-03-01 PROCEDURE — 99396 PREV VISIT EST AGE 40-64: CPT | Performed by: PHYSICIAN ASSISTANT

## 2019-03-01 RX ORDER — BENAZEPRIL HYDROCHLORIDE 40 MG/1
TABLET, FILM COATED ORAL
Qty: 60 TABLET | Refills: 5 | Status: SHIPPED | OUTPATIENT
Start: 2019-03-01 | End: 2019-09-30 | Stop reason: SDUPTHER

## 2019-03-01 NOTE — ASSESSMENT & PLAN NOTE
Currently on pravastatin 20 mg daily but not well controlled  He was encouraged to decrease his intake of fatty food and to avoid alcohol  Will recheck with labs prior to next visit

## 2019-03-01 NOTE — ASSESSMENT & PLAN NOTE
Patient has not seen pulmonology in probably over 5 years  He was encouraged to follow up with them and provided referral for this

## 2019-03-01 NOTE — PATIENT INSTRUCTIONS

## 2019-03-01 NOTE — ASSESSMENT & PLAN NOTE
Stable  He will continue Zoloft 50 mg daily  Will continue to monitor    He was encouraged to restart seeing his therapist

## 2019-03-01 NOTE — ASSESSMENT & PLAN NOTE
Patient was encouraged to use CPAP  Emphasized that the need for the machine is for himself not for sleeping partner

## 2019-03-01 NOTE — ASSESSMENT & PLAN NOTE
Well controlled  He will continue atenolol 100 mg daily, benazepril 40 mg twice daily, and hydrochlorothiazide 25 mg daily

## 2019-03-05 DIAGNOSIS — I10 ESSENTIAL HYPERTENSION: ICD-10-CM

## 2019-03-05 RX ORDER — HYDROCHLOROTHIAZIDE 25 MG/1
TABLET ORAL
Qty: 90 TABLET | Refills: 3 | Status: SHIPPED | OUTPATIENT
Start: 2019-03-05 | End: 2020-02-02

## 2019-05-10 ENCOUNTER — OFFICE VISIT (OUTPATIENT)
Dept: FAMILY MEDICINE CLINIC | Facility: CLINIC | Age: 49
End: 2019-05-10
Payer: COMMERCIAL

## 2019-05-10 ENCOUNTER — APPOINTMENT (OUTPATIENT)
Dept: RADIOLOGY | Facility: MEDICAL CENTER | Age: 49
End: 2019-05-10
Payer: COMMERCIAL

## 2019-05-10 VITALS
SYSTOLIC BLOOD PRESSURE: 162 MMHG | DIASTOLIC BLOOD PRESSURE: 104 MMHG | HEIGHT: 70 IN | BODY MASS INDEX: 27.36 KG/M2 | WEIGHT: 191.13 LBS | OXYGEN SATURATION: 96 % | HEART RATE: 126 BPM

## 2019-05-10 DIAGNOSIS — R20.2 ARM PARESTHESIA, LEFT: ICD-10-CM
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
## 2019-05-10 DIAGNOSIS — I10 ESSENTIAL HYPERTENSION: ICD-10-CM

## 2019-05-10 DIAGNOSIS — F41.9 ANXIETY: ICD-10-CM

## 2019-05-10 PROCEDURE — 73090 X-RAY EXAM OF FOREARM: CPT

## 2019-05-10 PROCEDURE — 99214 OFFICE O/P EST MOD 30 MIN: CPT | Performed by: PHYSICIAN ASSISTANT

## 2019-05-10 PROCEDURE — 93000 ELECTROCARDIOGRAM COMPLETE: CPT | Performed by: PHYSICIAN ASSISTANT

## 2019-05-10 PROCEDURE — 72040 X-RAY EXAM NECK SPINE 2-3 VW: CPT

## 2019-05-10 RX ORDER — AMLODIPINE BESYLATE 5 MG/1
5 TABLET ORAL DAILY
Qty: 30 TABLET | Refills: 1 | Status: SHIPPED | OUTPATIENT
Start: 2019-05-10 | End: 2019-06-04 | Stop reason: SDUPTHER

## 2019-05-11 PROBLEM — R20.2 ARM PARESTHESIA, LEFT: Status: ACTIVE | Noted: 2019-05-11

## 2019-05-25 DIAGNOSIS — F41.9 ANXIETY: ICD-10-CM

## 2019-05-31 ENCOUNTER — APPOINTMENT (OUTPATIENT)
Dept: LAB | Facility: CLINIC | Age: 49
End: 2019-05-31
Payer: COMMERCIAL

## 2019-05-31 DIAGNOSIS — I10 ESSENTIAL HYPERTENSION: ICD-10-CM

## 2019-05-31 DIAGNOSIS — R73.01 IMPAIRED FASTING GLUCOSE: ICD-10-CM

## 2019-05-31 DIAGNOSIS — R79.89 ABNORMAL LFTS: ICD-10-CM

## 2019-05-31 DIAGNOSIS — E78.00 HYPERCHOLESTEROLEMIA: ICD-10-CM

## 2019-05-31 LAB
ALBUMIN SERPL BCP-MCNC: 4.4 G/DL (ref 3.5–5)
ALP SERPL-CCNC: 55 U/L (ref 46–116)
ALT SERPL W P-5'-P-CCNC: 145 U/L (ref 12–78)
ANION GAP SERPL CALCULATED.3IONS-SCNC: 8 MMOL/L (ref 4–13)
AST SERPL W P-5'-P-CCNC: 69 U/L (ref 5–45)
BILIRUB SERPL-MCNC: 0.61 MG/DL (ref 0.2–1)
BUN SERPL-MCNC: 11 MG/DL (ref 5–25)
CALCIUM SERPL-MCNC: 9.4 MG/DL (ref 8.3–10.1)
CHLORIDE SERPL-SCNC: 98 MMOL/L (ref 100–108)
CHOLEST SERPL-MCNC: 273 MG/DL (ref 50–200)
CO2 SERPL-SCNC: 26 MMOL/L (ref 21–32)
CREAT SERPL-MCNC: 0.99 MG/DL (ref 0.6–1.3)
EST. AVERAGE GLUCOSE BLD GHB EST-MCNC: 105 MG/DL
GFR SERPL CREATININE-BSD FRML MDRD: 90 ML/MIN/1.73SQ M
GLUCOSE P FAST SERPL-MCNC: 109 MG/DL (ref 65–99)
HBA1C MFR BLD: 5.3 % (ref 4.2–6.3)
HDLC SERPL-MCNC: 51 MG/DL (ref 40–60)
LDLC SERPL CALC-MCNC: 156 MG/DL (ref 0–100)
POTASSIUM SERPL-SCNC: 3.9 MMOL/L (ref 3.5–5.3)
PROT SERPL-MCNC: 8.4 G/DL (ref 6.4–8.2)
SODIUM SERPL-SCNC: 132 MMOL/L (ref 136–145)
TRIGL SERPL-MCNC: 330 MG/DL

## 2019-05-31 PROCEDURE — 80053 COMPREHEN METABOLIC PANEL: CPT | Performed by: PHYSICIAN ASSISTANT

## 2019-05-31 PROCEDURE — 83036 HEMOGLOBIN GLYCOSYLATED A1C: CPT | Performed by: PHYSICIAN ASSISTANT

## 2019-05-31 PROCEDURE — 3044F HG A1C LEVEL LT 7.0%: CPT | Performed by: PHYSICIAN ASSISTANT

## 2019-05-31 PROCEDURE — 36415 COLL VENOUS BLD VENIPUNCTURE: CPT | Performed by: PHYSICIAN ASSISTANT

## 2019-05-31 PROCEDURE — 80061 LIPID PANEL: CPT

## 2019-06-04 ENCOUNTER — OFFICE VISIT (OUTPATIENT)
Dept: FAMILY MEDICINE CLINIC | Facility: CLINIC | Age: 49
End: 2019-06-04
Payer: COMMERCIAL

## 2019-06-04 VITALS
DIASTOLIC BLOOD PRESSURE: 82 MMHG | WEIGHT: 189 LBS | RESPIRATION RATE: 18 BRPM | HEIGHT: 70 IN | BODY MASS INDEX: 27.06 KG/M2 | OXYGEN SATURATION: 97 % | HEART RATE: 68 BPM | SYSTOLIC BLOOD PRESSURE: 102 MMHG

## 2019-06-04 DIAGNOSIS — I10 ESSENTIAL HYPERTENSION: ICD-10-CM

## 2019-06-04 DIAGNOSIS — I10 HYPERTENSION, UNSPECIFIED TYPE: ICD-10-CM

## 2019-06-04 DIAGNOSIS — F41.9 ANXIETY: ICD-10-CM

## 2019-06-04 DIAGNOSIS — R73.01 IMPAIRED FASTING GLUCOSE: ICD-10-CM

## 2019-06-04 DIAGNOSIS — R77.9 ELEVATED SERUM PROTEIN LEVEL: Primary | ICD-10-CM

## 2019-06-04 DIAGNOSIS — E66.3 OVERWEIGHT WITH BODY MASS INDEX (BMI) 25.0-29.9: ICD-10-CM

## 2019-06-04 DIAGNOSIS — R79.89 ABNORMAL LFTS: ICD-10-CM

## 2019-06-04 DIAGNOSIS — K22.70 BARRETT'S ESOPHAGUS WITHOUT DYSPLASIA: ICD-10-CM

## 2019-06-04 DIAGNOSIS — E78.00 HYPERCHOLESTEROLEMIA: ICD-10-CM

## 2019-06-04 PROCEDURE — 3079F DIAST BP 80-89 MM HG: CPT | Performed by: PHYSICIAN ASSISTANT

## 2019-06-04 PROCEDURE — 1036F TOBACCO NON-USER: CPT | Performed by: PHYSICIAN ASSISTANT

## 2019-06-04 PROCEDURE — 99214 OFFICE O/P EST MOD 30 MIN: CPT | Performed by: PHYSICIAN ASSISTANT

## 2019-06-04 PROCEDURE — 3074F SYST BP LT 130 MM HG: CPT | Performed by: PHYSICIAN ASSISTANT

## 2019-06-04 PROCEDURE — 3008F BODY MASS INDEX DOCD: CPT | Performed by: PHYSICIAN ASSISTANT

## 2019-06-04 RX ORDER — AMLODIPINE BESYLATE 5 MG/1
5 TABLET ORAL DAILY
Qty: 90 TABLET | Refills: 1 | Status: SHIPPED | OUTPATIENT
Start: 2019-06-04 | End: 2019-12-24 | Stop reason: SDUPTHER

## 2019-06-04 RX ORDER — PRAVASTATIN SODIUM 40 MG
40 TABLET ORAL
Qty: 90 TABLET | Refills: 1 | Status: SHIPPED | OUTPATIENT
Start: 2019-06-04 | End: 2019-12-04 | Stop reason: SDUPTHER

## 2019-08-03 DIAGNOSIS — I10 HYPERTENSION, UNSPECIFIED TYPE: ICD-10-CM

## 2019-08-03 RX ORDER — ATENOLOL 100 MG/1
100 TABLET ORAL DAILY
Qty: 30 TABLET | Refills: 5 | Status: SHIPPED | OUTPATIENT
Start: 2019-08-03 | End: 2020-01-31

## 2019-09-01 NOTE — PROGRESS NOTES
FAMILY PRACTICE OFFICE VISIT  [facility] - [facility] PRIMARY CARE       NAME: Moreno Trent  AGE: 54 y o  SEX: male       : 1970        MRN: [id number]    DATE: 2019  TIME: 1:10 PM    Assessment and Plan     Problem List Items Addressed This Visit        Endocrine    Impaired fasting glucose     We reviewed that his recent labs showed elevated fasting blood sugar of 117  His A1c a few months ago was 5 3%  Will recheck A1c in another 3 months with repeat labs prior to his next follow-up  Relevant Orders    Comprehensive metabolic panel    Lipid Panel with Direct LDL reflex    Hemoglobin A1C       Cardiovascular and Mediastinum    Hypertension     Patient's blood pressure was uncontrolled today  He has not been continuing the amlodipine 5 mg as prescribed  He was encouraged to get refills at pharmacy  He will that also continue with the atenolol 100 mg daily, benazepril 40 mg twice daily, and hydrochlorothiazide 25 mg daily  We will reassess at next visit will blood pressure was well controlled at last visit when all 4 medications  He was encouraged to monitor his blood pressure at home and call with any problems or concerns in the interim  Relevant Orders    Comprehensive metabolic panel    Lipid Panel with Direct LDL reflex    Hemoglobin A1C       Other    Hypercholesterolemia     We reviewed that his cholesterol values have increased since last visit even though his pravastatin has been increased from 20 mg to 40 mg daily  He was encouraged to work on exercising regularly and starting to improve his diet  He does not wish to make any changes with his cholesterol medication at this time although we discussed that it might be better to use a different statin  We did discuss the pros and cons associated with his LFTs and statin use as well    He decided that he would like to remain on the pravastatin 40 mg daily and start making lifestyle changes  We will reassess lipid panel prior to next visit in 3 months  Relevant Orders    Comprehensive metabolic panel    Lipid Panel with Direct LDL reflex    Hemoglobin A1C    Abnormal LFTs - Primary     We reviewed that his recent labs showed an increase in his LFTs  We discussed the affects of alcohol as well as statins on LFT elevations  He reports that he is going to take a break from alcohol and would like to stay the course with his pravastatin for now  He also reports that he is going to try to work on improving his lifestyle choices in general with exercising regularly and starting to follow the [address]  Relevant Orders    Comprehensive metabolic panel    Lipid Panel with Direct LDL reflex    Hemoglobin A1C    Alcohol screening     Patient was provided a slip to check urine alcohol testing weekly or as desired  He is aware that he will likely need to pay for this out-of-pocket  He was encouraged to completely avoid alcohol intake and to use mouthwash that does not contain any alcohol  Will call patient upon receipt of results  Relevant Orders    Alcohol, Ethyl, Qn, Random Urine    Alcohol, Ethyl, (U) Ql      Other Visit Diagnoses     Need for influenza vaccination        Relevant Orders    PREFERRED: influenza vaccine, [id number], quadrivalent, recombinant, PF, 0 5 mL (FLUBLOK) (Completed)          Impaired fasting glucose  We reviewed that his recent labs showed elevated fasting blood sugar of 117  His A1c a few months ago was 5 3%  Will recheck A1c in another 3 months with repeat labs prior to his next follow-up  Hypertension  Patient's blood pressure was uncontrolled today  He has not been continuing the amlodipine 5 mg as prescribed  He was encouraged to get refills at pharmacy  He will that also continue with the atenolol 100 mg daily, benazepril 40 mg twice daily, and hydrochlorothiazide 25 mg daily    We will reassess at next visit will blood pressure was well controlled at last visit when all 4 medications  He was encouraged to monitor his blood pressure at home and call with any problems or concerns in the interim  Abnormal LFTs  We reviewed that his recent labs showed an increase in his LFTs  We discussed the affects of alcohol as well as statins on LFT elevations  He reports that he is going to take a break from alcohol and would like to stay the course with his pravastatin for now  He also reports that he is going to try to work on improving his lifestyle choices in general with exercising regularly and starting to follow the [address]  Hypercholesterolemia  We reviewed that his cholesterol values have increased since last visit even though his pravastatin has been increased from 20 mg to 40 mg daily  He was encouraged to work on exercising regularly and starting to improve his diet  He does not wish to make any changes with his cholesterol medication at this time although we discussed that it might be better to use a different statin  We did discuss the pros and cons associated with his LFTs and statin use as well  He decided that he would like to remain on the pravastatin 40 mg daily and start making lifestyle changes  We will reassess lipid panel prior to next visit in 3 months  Alcohol screening  Patient was provided a slip to check urine alcohol testing weekly or as desired  He is aware that he will likely need to pay for this out-of-pocket  He was encouraged to completely avoid alcohol intake and to use mouthwash that does not contain any alcohol  Will call patient upon receipt of results  Chief Complaint     Chief Complaint   Patient presents with    Follow-up     HTN and review labs       History of Present Illness   Alfredo Barnett is a 54y o -year-old male who presents for 3 month follow-up on chronic medical problems       Patient does continue with Nexium 40 mg twice daily as well as regular GI follow up for his Muro's esophagus  He denies any symptoms  The patient reports that current blood pressure medications include atenolol 100 mg daily, benazepril 40 mg twice daily, and hydrochlorothiazide 25 mg daily (but no recent amlodipine 5 mg daily - not refilled?)  Blood pressure readings at home are not checked  The patient denies symptoms of poor control such as chest pain, shortness of breath, leg swelling, vision changes, headaches, or dizziness  Most recent labs show that his LFTs have been increased again  He reports that his alcohol intake is on weekends about 3-4 a day  The patient reports that recently anxiety/depression level has been improved  Daily medication no longer includes Zoloft 50 mg daily - stopped about 3-4 months ago  Care team includes a therapist- seeing Eamon Falcon  The patient denies panic attacks  The patient denies SI/HI  Today's PHQ2 score was 0  The patient continues with pravastatin 40 milligrams daily (increased last visit)  Last LDL was 174 yesterday - up from 156 in May  Since the patent's last visit, weight has increased 7 5 pounds - thinks part was by eating a lot in [place]  Exercise occurs only with walks  Review of Systems   Review of Systems   Gastrointestinal:        No heartburn   Psychiatric/Behavioral: Negative for dysphoric mood  The patient is not nervous/anxious          Active Problem List     Patient Active Problem List   Diagnosis    Allergic rhinitis    Muro's esophagus    Asthma, mild intermittent    Hematuria    Hiatal hernia    Hypercholesterolemia    Hypertension    Impaired fasting glucose    Sarcoidosis    Sleep apnea    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    Anxiety    Abnormal LFTs    Arm paresthesia, left    Alcohol screening         Past Medical History:  Past Medical History:   Diagnosis Date    Chest pain     Sarcoidosis        Past Surgical History:  Past Surgical History: Procedure Laterality Date    BRONCHOSCOPY      COLONOSCOPY      complete     CYSTOSCOPY      Diagnostic - Neg     ESOPHAGOGASTRODUODENOSCOPY      Diagnostic        Family History:  Family History   Problem Relation Age of Onset    Colon cancer Mother         age in 29s    Hyperlipidemia Mother     Hyperlipidemia Father     Pancreatic cancer Father         Adenocarcinoma of the ampulla of vater     Colon cancer Maternal Uncle     Colon cancer Other         maternal relatives - likely MGF and a second cousin in her 35s       Social History:  Social History     Socioeconomic History    Marital status: /Civil Union     Spouse name: Not on file    Number of children: Not on file    Years of education: Not on file    Highest education level: Not on file   Occupational History    Occupation:     Social Needs    Financial resource strain: Not on file    Food insecurity:     Worry: Not on file     Inability: Not on file    Transportation needs:     Medical: Not on file     Non-medical: Not on file   Tobacco Use    Smoking status: Former Smoker     Types: Cigarettes     Last attempt to quit:      Years since quittin 6    Smokeless tobacco: Former User    Tobacco comment: smoked about 1 pack every 2 weeks for 2 years in his 19's    Substance and Sexual Activity    Alcohol use: Yes     Comment: reports down to 5 drinks once weekly    Drug use: No    Sexual activity: Not on file   Lifestyle    Physical activity:     Days per week: Not on file     Minutes per session: Not on file    Stress: Not on file   Relationships    Social connections:     Talks on phone: Not on file     Gets together: Not on file     Attends [employer] service: Not on file     Active member of club or organization: Not on file     Attends meetings of clubs or organizations: Not on file     Relationship status: Not on file    Intimate partner violence:     Fear of current or ex partner: Not on file Emotionally abused: Not on file     Physically abused: Not on file     Forced sexual activity: Not on file   Other Topics Concern    Not on file   Social History Narrative    Lives home with wife and daughter        Objective     Vitals:    09/06/19 1112 09/06/19 1203   BP: 120/90 130/100   BP Location: Left arm    Patient Position: Sitting    Cuff Size: Standard    Pulse: 64    Weight: 89 1 kg (196 lb 8 oz)    Height: 5' 10 1" (1 781 m)      Wt Readings from Last 3 Encounters:   09/06/19 89 1 kg (196 lb 8 oz)   06/04/19 85 7 kg (189 lb)   05/10/19 86 7 kg (191 lb 2 oz)       Physical Exam   Constitutional: He appears well-developed and well-nourished  No distress  Neck: Neck supple  No thyromegaly present  Cardiovascular: Normal rate, regular rhythm, normal heart sounds and intact distal pulses  No murmur heard  Pulmonary/Chest: Effort normal and breath sounds normal  He has no wheezes  He has no rales  Abdominal: Soft  Bowel sounds are normal  He exhibits no distension  There is no tenderness  There is no guarding  Musculoskeletal: He exhibits no edema  Lymphadenopathy:     He has no cervical adenopathy  Skin: Skin is warm and dry  Psychiatric: He has a normal mood and affect  Vitals reviewed        Pertinent Laboratory/Diagnostic Studies:  Lab Results   Component Value Date    GLUCOSE 99 07/25/2014    BUN 18 09/05/2019    CREATININE 0 97 09/05/2019    CALCIUM 9 9 09/05/2019     07/25/2014    K 4 0 09/05/2019    CO2 29 09/05/2019     09/05/2019     Lab Results   Component Value Date     (H) 09/05/2019     (H) 09/05/2019    ALKPHOS 46 09/05/2019    BILITOT 0 66 07/25/2014       Lab Results   Component Value Date    WBC 7 82 07/25/2014    HGB 16 6 07/25/2014    HCT 47 6 07/25/2014    MCV 88 07/25/2014     07/25/2014     Lab Results   Component Value Date    CHOL 239 07/25/2014     Lab Results   Component Value Date    TRIG 380 (H) 09/05/2019     Lab Results Component Value Date    HDL 50 09/05/2019     Lab Results   Component Value Date    LDLCALC 174 (H) 09/05/2019     Lab Results   Component Value Date    HGBA1C 5 3 05/31/2019       Results for orders placed or performed in visit on 09/05/19   Protein electrophoresis, serum   Result Value Ref Range    A/G Ratio 1 57 1 10 - 1 80    Albumin Electrophoresis 61 1 52 0 - 65 0 %    Albumin CONC 4 83 3 50 - 5 00 g/dl    Alpha 1 3 5 2 5 - 5 0 %    ALPHA 1 CONC 0 28 0 10 - 0 40 g/dL    Alpha 2 9 6 7 0 - 13 0 %    ALPHA 2 CONC 0 76 0 40 - 1 20 g/dL    Beta-1 6 7 5 0 - 13 0 %    BETA 1 CONC 0 53 0 40 - 0 80 g/dL    Beta-2 4 8 2 0 - 8 0 %    BETA 2 CONC 0 38 0 20 - 0 50 g/dL    Gamma Globulin 14 3 12 0 - 22 0 %    GAMMA CONC 1 13 0 50 - 1 60 g/dL    Total Protein 7 9 6 4 - 8 2 g/dL    SPEP Interpretation       No monoclonal bands noted  Reviewed by: Shay [facility], MD, PhD ([id number]) **Electronic Signature**   Comprehensive metabolic panel   Result Value Ref Range    Sodium 134 (L) 136 - 145 mmol/L    Potassium 4 0 3 5 - 5 3 mmol/L    Chloride 100 100 - 108 mmol/L    CO2 29 21 - 32 mmol/L    ANION GAP 5 4 - 13 mmol/L    BUN 18 5 - 25 mg/dL    Creatinine 0 97 0 60 - 1 30 mg/dL    Glucose, Fasting 117 (H) 65 - 99 mg/dL    Calcium 9 9 8 3 - 10 1 mg/dL     (H) 5 - 45 U/L     (H) 12 - 78 U/L    Alkaline Phosphatase 46 46 - 116 U/L    Total Protein 8 2 6 4 - 8 2 g/dL    Albumin 3 9 3 5 - 5 0 g/dL    Total Bilirubin 0 84 0 20 - 1 00 mg/dL    eGFR 93 ml/min/1 73sq m   Lipid Panel with Direct LDL reflex   Result Value Ref Range    Cholesterol 300 (H) 50 - 200 mg/dL    Triglycerides 380 (H) <=150 mg/dL    HDL, Direct 50 40 - 60 mg/dL    LDL Calculated 174 (H) 0 - 100 mg/dL       Orders Placed This Encounter   Procedures    PREFERRED: influenza vaccine, [id number], quadrivalent, recombinant, PF, 0 5 mL (FLUBLOK)    Alcohol, Ethyl, Qn, Random Urine    Alcohol, Ethyl, (U) Ql    Comprehensive metabolic panel    Lipid Panel with Direct LDL reflex    Hemoglobin A1C       ALLERGIES:  Allergies   Allergen Reactions    Other      seasonal       Current Medications     Current Outpatient Medications   Medication Sig Dispense Refill    albuterol (PROAIR HFA) 90 mcg/act inhaler Inhale 2 puffs every 6 (six) hours as needed      atenolol (TENORMIN) 100 mg tablet TAKE 1 TABLET (100 MG TOTAL) BY MOUTH DAILY 30 tablet 5    benazepril (LOTENSIN) 40 MG tablet Take 1 tablet twice daily  60 tablet 5    esomeprazole (NexIUM) 40 MG capsule Take 40 mg by mouth 2 (two) times a day    0    fluticasone (FLONASE) 50 mcg/act nasal spray 1 spray into each nostril daily as needed for rhinitis      hydrochlorothiazide (HYDRODIURIL) 25 mg tablet TAKE 1 TABLET BY MOUTH EVERY DAY 90 tablet 3    Milk Thistle 250 MG CAPS Take 1 caplet by mouth daily      pravastatin (PRAVACHOL) 40 mg tablet Take 1 tablet (40 mg total) by mouth daily at bedtime 90 tablet 1    amLODIPine (NORVASC) 5 mg tablet Take 1 tablet (5 mg total) by mouth daily (Patient not taking: Reported on 9/6/2019) 90 tablet 1    loratadine-pseudoephedrine (CLARITIN-D 24 HOUR)  mg per 24 hr tablet Take 1 tablet by mouth daily as needed      sertraline (ZOLOFT) 50 mg tablet TAKE 1 TABLET BY MOUTH EVERY DAY (Patient not taking: Reported on 9/6/2019) 30 tablet 1     No current facility-administered medications for this visit            Health Maintenance     Health Maintenance   Topic Date Due    INFLUENZA VACCINE  07/01/2019    CRC Screening: Colonoscopy  02/05/2020    Depression Screening PHQ  03/01/2020    BMI: Followup Plan  03/01/2020    BMI: Adult  06/04/2020    DTaP,Tdap,and Td Vaccines (3 - Td) 01/11/2027    Pneumococcal Vaccine: 65+ Years (1 of 2 - PCV13) 02/21/2037    Pneumococcal Vaccine: Pediatrics (0 to 5 Years) and At-Risk Patients (6 to 59 Years)  Aged Out    HEPATITIS B VACCINES  Aged [employer] History   Administered Date(s) Administered    INFLUENZA 10/12/2018    Influenza Quadrivalent Preservative Free 3 years and older IM 10/01/2017    Influenza TIV (IM) 01/02/2007, 11/08/2015    Influenza, recombinant, quadrivalent,injectable, preservative free 09/06/2019    Tdap 11/30/2015, 01/11/2017       Leopoldo Mocha, PA-C  9/6/2019 1:09 PM  [facility]

## 2019-09-03 ENCOUNTER — TELEPHONE (OUTPATIENT)
Dept: FAMILY MEDICINE CLINIC | Facility: CLINIC | Age: 49
End: 2019-09-03

## 2019-09-03 NOTE — TELEPHONE ENCOUNTER
Ok  We can discuss options when he comes in on Friday  Please remind him to have his bloodwork done as ordered at his last visit

## 2019-09-03 NOTE — TELEPHONE ENCOUNTER
Pt is going through a divorce and his soon to be ex spouse is saying that he drinks a lot pt would like a script to go have weekly urine test done for alcohol use he will  the script at his ov

## 2019-09-05 ENCOUNTER — APPOINTMENT (OUTPATIENT)
Dept: LAB | Facility: MEDICAL CENTER | Age: 49
End: 2019-09-05
Payer: COMMERCIAL

## 2019-09-05 DIAGNOSIS — E78.00 HYPERCHOLESTEROLEMIA: ICD-10-CM

## 2019-09-05 DIAGNOSIS — I10 ESSENTIAL HYPERTENSION: ICD-10-CM

## 2019-09-05 DIAGNOSIS — R77.9 ELEVATED SERUM PROTEIN LEVEL: ICD-10-CM

## 2019-09-05 DIAGNOSIS — R79.89 ABNORMAL LFTS: ICD-10-CM

## 2019-09-05 LAB
ALBUMIN SERPL BCP-MCNC: 3.9 G/DL (ref 3.5–5)
ALP SERPL-CCNC: 46 U/L (ref 46–116)
ALT SERPL W P-5'-P-CCNC: 188 U/L (ref 12–78)
ANION GAP SERPL CALCULATED.3IONS-SCNC: 5 MMOL/L (ref 4–13)
AST SERPL W P-5'-P-CCNC: 117 U/L (ref 5–45)
BILIRUB SERPL-MCNC: 0.84 MG/DL (ref 0.2–1)
BUN SERPL-MCNC: 18 MG/DL (ref 5–25)
CALCIUM SERPL-MCNC: 9.9 MG/DL (ref 8.3–10.1)
CHLORIDE SERPL-SCNC: 100 MMOL/L (ref 100–108)
CHOLEST SERPL-MCNC: 300 MG/DL (ref 50–200)
CO2 SERPL-SCNC: 29 MMOL/L (ref 21–32)
CREAT SERPL-MCNC: 0.97 MG/DL (ref 0.6–1.3)
GFR SERPL CREATININE-BSD FRML MDRD: 93 ML/MIN/1.73SQ M
GLUCOSE P FAST SERPL-MCNC: 117 MG/DL (ref 65–99)
HDLC SERPL-MCNC: 50 MG/DL (ref 40–60)
LDLC SERPL CALC-MCNC: 174 MG/DL (ref 0–100)
POTASSIUM SERPL-SCNC: 4 MMOL/L (ref 3.5–5.3)
PROT SERPL-MCNC: 8.2 G/DL (ref 6.4–8.2)
SODIUM SERPL-SCNC: 134 MMOL/L (ref 136–145)
TRIGL SERPL-MCNC: 380 MG/DL

## 2019-09-05 PROCEDURE — 84165 PROTEIN E-PHORESIS SERUM: CPT | Performed by: PATHOLOGY

## 2019-09-05 PROCEDURE — 84165 PROTEIN E-PHORESIS SERUM: CPT

## 2019-09-05 PROCEDURE — 80061 LIPID PANEL: CPT

## 2019-09-05 PROCEDURE — 36415 COLL VENOUS BLD VENIPUNCTURE: CPT

## 2019-09-05 PROCEDURE — 80053 COMPREHEN METABOLIC PANEL: CPT

## 2019-09-06 ENCOUNTER — OFFICE VISIT (OUTPATIENT)
Dept: FAMILY MEDICINE CLINIC | Facility: CLINIC | Age: 49
End: 2019-09-06
Payer: COMMERCIAL

## 2019-09-06 VITALS
HEIGHT: 70 IN | BODY MASS INDEX: 28.13 KG/M2 | SYSTOLIC BLOOD PRESSURE: 130 MMHG | WEIGHT: 196.5 LBS | DIASTOLIC BLOOD PRESSURE: 100 MMHG | HEART RATE: 64 BPM

## 2019-09-06 DIAGNOSIS — R79.89 ABNORMAL LFTS: Primary | ICD-10-CM

## 2019-09-06 DIAGNOSIS — E78.00 HYPERCHOLESTEROLEMIA: ICD-10-CM

## 2019-09-06 DIAGNOSIS — R73.01 IMPAIRED FASTING GLUCOSE: ICD-10-CM

## 2019-09-06 DIAGNOSIS — Z23 NEED FOR INFLUENZA VACCINATION: ICD-10-CM

## 2019-09-06 DIAGNOSIS — Z13.39 ALCOHOL SCREENING: ICD-10-CM

## 2019-09-06 DIAGNOSIS — I10 ESSENTIAL HYPERTENSION: ICD-10-CM

## 2019-09-06 LAB
ALBUMIN SERPL ELPH-MCNC: 4.83 G/DL (ref 3.5–5)
ALBUMIN SERPL ELPH-MCNC: 61.1 % (ref 52–65)
ALPHA1 GLOB SERPL ELPH-MCNC: 0.28 G/DL (ref 0.1–0.4)
ALPHA1 GLOB SERPL ELPH-MCNC: 3.5 % (ref 2.5–5)
ALPHA2 GLOB SERPL ELPH-MCNC: 0.76 G/DL (ref 0.4–1.2)
ALPHA2 GLOB SERPL ELPH-MCNC: 9.6 % (ref 7–13)
BETA GLOB ABNORMAL SERPL ELPH-MCNC: 0.53 G/DL (ref 0.4–0.8)
BETA1 GLOB SERPL ELPH-MCNC: 6.7 % (ref 5–13)
BETA2 GLOB SERPL ELPH-MCNC: 4.8 % (ref 2–8)
BETA2+GAMMA GLOB SERPL ELPH-MCNC: 0.38 G/DL (ref 0.2–0.5)
GAMMA GLOB ABNORMAL SERPL ELPH-MCNC: 1.13 G/DL (ref 0.5–1.6)
GAMMA GLOB SERPL ELPH-MCNC: 14.3 % (ref 12–22)
IGG/ALB SER: 1.57 {RATIO} (ref 1.1–1.8)
PROT PATTERN SERPL ELPH-IMP: NORMAL
PROT SERPL-MCNC: 7.9 G/DL (ref 6.4–8.2)

## 2019-09-06 PROCEDURE — 90682 RIV4 VACC RECOMBINANT DNA IM: CPT | Performed by: PHYSICIAN ASSISTANT

## 2019-09-06 PROCEDURE — 99214 OFFICE O/P EST MOD 30 MIN: CPT | Performed by: PHYSICIAN ASSISTANT

## 2019-09-06 PROCEDURE — 90471 IMMUNIZATION ADMIN: CPT | Performed by: PHYSICIAN ASSISTANT

## 2019-09-06 PROCEDURE — 3008F BODY MASS INDEX DOCD: CPT | Performed by: PHYSICIAN ASSISTANT

## 2019-09-06 RX ORDER — FLUTICASONE PROPIONATE 50 MCG
1 SPRAY, SUSPENSION (ML) NASAL DAILY PRN
COMMUNITY

## 2019-09-06 NOTE — ASSESSMENT & PLAN NOTE
We reviewed that his cholesterol values have increased since last visit even though his pravastatin has been increased from 20 mg to 40 mg daily  He was encouraged to work on exercising regularly and starting to improve his diet  He does not wish to make any changes with his cholesterol medication at this time although we discussed that it might be better to use a different statin  We did discuss the pros and cons associated with his LFTs and statin use as well  He decided that he would like to remain on the pravastatin 40 mg daily and start making lifestyle changes  We will reassess lipid panel prior to next visit in 3 months

## 2019-09-06 NOTE — ASSESSMENT & PLAN NOTE
We reviewed that his recent labs showed elevated fasting blood sugar of 117  His A1c a few months ago was 5 3%  Will recheck A1c in another 3 months with repeat labs prior to his next follow-up

## 2019-09-06 NOTE — ASSESSMENT & PLAN NOTE
We reviewed that his recent labs showed an increase in his LFTs  We discussed the affects of alcohol as well as statins on LFT elevations  He reports that he is going to take a break from alcohol and would like to stay the course with his pravastatin for now  He also reports that he is going to try to work on improving his lifestyle choices in general with exercising regularly and starting to follow the [address]

## 2019-09-06 NOTE — ASSESSMENT & PLAN NOTE
Patient's blood pressure was uncontrolled today  He has not been continuing the amlodipine 5 mg as prescribed  He was encouraged to get refills at pharmacy  He will that also continue with the atenolol 100 mg daily, benazepril 40 mg twice daily, and hydrochlorothiazide 25 mg daily  We will reassess at next visit will blood pressure was well controlled at last visit when all 4 medications  He was encouraged to monitor his blood pressure at home and call with any problems or concerns in the interim

## 2019-09-06 NOTE — ASSESSMENT & PLAN NOTE
Patient was provided a slip to check urine alcohol testing weekly or as desired  He is aware that he will likely need to pay for this out-of-pocket  He was encouraged to completely avoid alcohol intake and to use mouthwash that does not contain any alcohol  Will call patient upon receipt of results

## 2019-09-09 LAB — ETHANOL UR QL: NEGATIVE

## 2019-09-17 ENCOUNTER — TELEPHONE (OUTPATIENT)
Dept: FAMILY MEDICINE CLINIC | Facility: CLINIC | Age: 49
End: 2019-09-17

## 2019-09-17 NOTE — TELEPHONE ENCOUNTER
Susy blandon for the follow-up on this call  I agree with everything that you said to try to explain the reasoning to him

## 2019-09-17 NOTE — TELEPHONE ENCOUNTER
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 [place]  I apologized to pt and explained that with this kind of test it is very important that we are able to verify the sample is provided from the patient and therefore must be provided at the lab  Pt states this is ridiculous, I volunteered for this testing and I did not ask anyone to verify anything for me, the lab results will speak for themselves  I did explain that this is the standard protocol for this test and pt states, Mj Polk knows me and knows my situation and would not want me to lose this week of building a baseline and that it was not fair that the lab told him the wrong thing  I apologized again for the miscommunication with the lab as I am not sure why they would have provided the specimen cups unless they did not know what kind of testing he was having completed  Pt very frustrated and states he still does not understand why we would not accept the specimen

## 2019-09-30 DIAGNOSIS — I10 ESSENTIAL HYPERTENSION: ICD-10-CM

## 2019-09-30 RX ORDER — BENAZEPRIL HYDROCHLORIDE 40 MG/1
TABLET, FILM COATED ORAL
Qty: 60 TABLET | Refills: 5 | Status: SHIPPED | OUTPATIENT
Start: 2019-09-30 | End: 2020-02-14 | Stop reason: SDUPTHER

## 2019-09-30 RX ORDER — BENAZEPRIL HYDROCHLORIDE 40 MG/1
TABLET, FILM COATED ORAL
Qty: 60 TABLET | Refills: 5 | Status: SHIPPED | OUTPATIENT
Start: 2019-09-30 | End: 2019-09-30 | Stop reason: SDUPTHER

## 2019-10-04 ENCOUNTER — TELEPHONE (OUTPATIENT)
Dept: FAMILY MEDICINE CLINIC | Facility: CLINIC | Age: 49
End: 2019-10-04

## 2019-10-04 NOTE — TELEPHONE ENCOUNTER
Notes recorded by Sohail Armijo MA on 10/4/2019 at 3:54 PM EDT  Pt informed and would like to know if this lab test could be a reoccurring test  ------    Notes recorded by Alfred Lema PA-C on 10/3/2019 at 4:55 PM EDT  Please let the patient know that his urine was negative for evidence of alcohol

## 2019-10-08 LAB — ETHANOL UR-MCNC: NORMAL MG/DL

## 2019-10-22 LAB — ETHANOL UR-MCNC: NORMAL MG/DL

## 2019-11-05 LAB — ETHANOL UR-MCNC: NORMAL MG/DL

## 2019-12-04 DIAGNOSIS — E78.00 HYPERCHOLESTEROLEMIA: ICD-10-CM

## 2019-12-05 RX ORDER — PRAVASTATIN SODIUM 40 MG
40 TABLET ORAL
Qty: 30 TABLET | Refills: 5 | Status: SHIPPED | OUTPATIENT
Start: 2019-12-05 | End: 2019-12-24

## 2019-12-14 ENCOUNTER — APPOINTMENT (OUTPATIENT)
Dept: LAB | Facility: MEDICAL CENTER | Age: 49
End: 2019-12-14
Payer: COMMERCIAL

## 2019-12-14 DIAGNOSIS — E78.00 HYPERCHOLESTEROLEMIA: ICD-10-CM

## 2019-12-14 DIAGNOSIS — I10 ESSENTIAL HYPERTENSION: ICD-10-CM

## 2019-12-14 DIAGNOSIS — R73.01 IMPAIRED FASTING GLUCOSE: ICD-10-CM

## 2019-12-14 DIAGNOSIS — R79.89 ABNORMAL LFTS: ICD-10-CM

## 2019-12-14 LAB
ALBUMIN SERPL BCP-MCNC: 4.1 G/DL (ref 3.5–5)
ALP SERPL-CCNC: 46 U/L (ref 46–116)
ALT SERPL W P-5'-P-CCNC: 206 U/L (ref 12–78)
ANION GAP SERPL CALCULATED.3IONS-SCNC: 5 MMOL/L (ref 4–13)
AST SERPL W P-5'-P-CCNC: 90 U/L (ref 5–45)
BILIRUB SERPL-MCNC: 0.84 MG/DL (ref 0.2–1)
BUN SERPL-MCNC: 19 MG/DL (ref 5–25)
CALCIUM SERPL-MCNC: 10 MG/DL (ref 8.3–10.1)
CHLORIDE SERPL-SCNC: 99 MMOL/L (ref 100–108)
CHOLEST SERPL-MCNC: 270 MG/DL (ref 50–200)
CO2 SERPL-SCNC: 29 MMOL/L (ref 21–32)
CREAT SERPL-MCNC: 1.13 MG/DL (ref 0.6–1.3)
EST. AVERAGE GLUCOSE BLD GHB EST-MCNC: 105 MG/DL
GFR SERPL CREATININE-BSD FRML MDRD: 77 ML/MIN/1.73SQ M
GLUCOSE P FAST SERPL-MCNC: 113 MG/DL (ref 65–99)
HBA1C MFR BLD: 5.3 % (ref 4.2–6.3)
HDLC SERPL-MCNC: 36 MG/DL
LDLC SERPL CALC-MCNC: 174 MG/DL (ref 0–100)
POTASSIUM SERPL-SCNC: 3.8 MMOL/L (ref 3.5–5.3)
PROT SERPL-MCNC: 7.9 G/DL (ref 6.4–8.2)
SODIUM SERPL-SCNC: 133 MMOL/L (ref 136–145)
TRIGL SERPL-MCNC: 299 MG/DL

## 2019-12-14 PROCEDURE — 36415 COLL VENOUS BLD VENIPUNCTURE: CPT

## 2019-12-14 PROCEDURE — 80061 LIPID PANEL: CPT

## 2019-12-14 PROCEDURE — 80053 COMPREHEN METABOLIC PANEL: CPT

## 2019-12-14 PROCEDURE — 83036 HEMOGLOBIN GLYCOSYLATED A1C: CPT

## 2019-12-19 LAB — ETHANOL UR-MCNC: NORMAL MG/DL

## 2019-12-24 ENCOUNTER — OFFICE VISIT (OUTPATIENT)
Dept: FAMILY MEDICINE CLINIC | Facility: CLINIC | Age: 49
End: 2019-12-24
Payer: COMMERCIAL

## 2019-12-24 VITALS
HEART RATE: 80 BPM | OXYGEN SATURATION: 95 % | SYSTOLIC BLOOD PRESSURE: 136 MMHG | HEIGHT: 70 IN | WEIGHT: 204 LBS | RESPIRATION RATE: 18 BRPM | BODY MASS INDEX: 29.2 KG/M2 | DIASTOLIC BLOOD PRESSURE: 100 MMHG

## 2019-12-24 DIAGNOSIS — Z13.39 ALCOHOL SCREENING: ICD-10-CM

## 2019-12-24 DIAGNOSIS — R73.01 IMPAIRED FASTING GLUCOSE: ICD-10-CM

## 2019-12-24 DIAGNOSIS — I10 ESSENTIAL HYPERTENSION: Primary | ICD-10-CM

## 2019-12-24 DIAGNOSIS — K22.70 BARRETT'S ESOPHAGUS WITHOUT DYSPLASIA: ICD-10-CM

## 2019-12-24 DIAGNOSIS — E78.00 HYPERCHOLESTEROLEMIA: ICD-10-CM

## 2019-12-24 DIAGNOSIS — R79.89 ABNORMAL LFTS: ICD-10-CM

## 2019-12-24 DIAGNOSIS — E66.3 OVERWEIGHT WITH BODY MASS INDEX (BMI) 25.0-29.9: ICD-10-CM

## 2019-12-24 PROCEDURE — 1036F TOBACCO NON-USER: CPT | Performed by: PHYSICIAN ASSISTANT

## 2019-12-24 PROCEDURE — 3008F BODY MASS INDEX DOCD: CPT | Performed by: PHYSICIAN ASSISTANT

## 2019-12-24 PROCEDURE — 99214 OFFICE O/P EST MOD 30 MIN: CPT | Performed by: PHYSICIAN ASSISTANT

## 2019-12-24 RX ORDER — AMLODIPINE BESYLATE 10 MG/1
10 TABLET ORAL DAILY
Qty: 90 TABLET | Refills: 1 | Status: SHIPPED | OUTPATIENT
Start: 2019-12-24 | End: 2020-06-02 | Stop reason: SDUPTHER

## 2019-12-24 RX ORDER — PRAVASTATIN SODIUM 40 MG
80 TABLET ORAL
Start: 2019-12-24 | End: 2020-07-17

## 2019-12-24 NOTE — ASSESSMENT & PLAN NOTE
Patient was provided new slip for the test that he believes it is expensive for him to get his urine alcohol screening done

## 2019-12-24 NOTE — ASSESSMENT & PLAN NOTE
Overall appears stable  Patient states that he has significantly decreased his alcohol intake  Had an ultrasound of the liver about 11 months ago that showed a fatty liver  Patient is pravastatin for cholesterol  Increase in dose does seem to have an effect on his overall readings  Will continue to closely monitor further increase in dose  Was encouraged to to with Gastroenterology regarding these elevations as well

## 2019-12-24 NOTE — ASSESSMENT & PLAN NOTE
Uncontrolled again today  Reviewed that his readings at home also sounds borderline  He was encouraged to increase amlodipine to 10 mg daily  He will continue also with atenolol 100 mg daily, benazepril 40 mg twice daily and hydrochlorothiazide 25 mg daily  We will reassess in about 6 weeks

## 2019-12-24 NOTE — PROGRESS NOTES
FAMILY PRACTICE OFFICE VISIT  [facility] - [facility]       NAME: Moreno Ternt  AGE: 54 y o  SEX: male       : 1970        MRN: [id number]    DATE: 2019  TIME: 12:50 PM    Assessment and Plan     Problem List Items Addressed This Visit        Digestive    Muro's esophagus     Patient had EGD done earlier this month  He will continue to with Gastroenterology as directed  Endocrine    Impaired fasting glucose     Reviewed that his fasting blood sugar was again mildly elevated on recent labs done about a week and half ago  His A1c that was normal at 5 3%  He was encouraged to limit intake of carbohydrates and will continue to monitor  Cardiovascular and Mediastinum    Hypertension - Primary     Uncontrolled again today  Reviewed that his readings at home also sounds borderline  He was encouraged to increase amlodipine to 10 mg daily  He will continue also with atenolol 100 mg daily, benazepril 40 mg twice daily and hydrochlorothiazide 25 mg daily  We will reassess in about 6 weeks  Relevant Medications    amLODIPine (NORVASC) 10 mg tablet       Other    Hypercholesterolemia     Patient still has LDL of 174 on most recent labs despite increasing pravastatin to 40 mg daily  Discussed switching statin but patient would prefer to increase pravastatin dose since he has a large stock pile of this at home  He will increase to 80 mg daily and will plan on rechecking lipid panel in 3 months  Will check CMP prior to his next visit in a month though in the interim  If cholesterol does not improve with 80 mg of pravastatin, should consider switching to Crestor  Relevant Medications    pravastatin (PRAVACHOL) 40 mg tablet    Abnormal LFTs     Overall appears stable  Patient states that he has significantly decreased his alcohol intake  Had an ultrasound of the liver about 11 months ago that showed a fatty liver  Patient is pravastatin for cholesterol  Increase in dose does seem to have an effect on his overall readings  Will continue to closely monitor further increase in dose  Was encouraged to to with Gastroenterology regarding these elevations as well  Relevant Orders    Ambulatory referral to Gastroenterology    Comprehensive metabolic panel    Alcohol screening     Patient was provided new slip for the test that he believes it is expensive for him to get his urine alcohol screening done  Relevant Orders    Alcohol, Ethyl, (U) Ql    Overweight with body mass index (BMI) 25 0-29 9     BMI Counseling: Body mass index is 29 19 kg/m²  The BMI is above normal  Nutrition recommendations include moderation in carbohydrate intake  Muro's esophagus  Patient had EGD done earlier this month  He will continue to with Gastroenterology as directed  Impaired fasting glucose  Reviewed that his fasting blood sugar was again mildly elevated on recent labs done about a week and half ago  His A1c that was normal at 5 3%  He was encouraged to limit intake of carbohydrates and will continue to monitor  Hypertension  Uncontrolled again today  Reviewed that his readings at home also sounds borderline  He was encouraged to increase amlodipine to 10 mg daily  He will continue also with atenolol 100 mg daily, benazepril 40 mg twice daily and hydrochlorothiazide 25 mg daily  We will reassess in about 6 weeks  Abnormal LFTs  Overall appears stable  Patient states that he has significantly decreased his alcohol intake  Had an ultrasound of the liver about 11 months ago that showed a fatty liver  Patient is pravastatin for cholesterol  Increase in dose does seem to have an effect on his overall readings  Will continue to closely monitor further increase in dose  Was encouraged to to with Gastroenterology regarding these elevations as well      Alcohol screening  Patient was provided new slip for the test that he believes it is expensive for him to get his urine alcohol screening done  Hypercholesterolemia  Patient still has LDL of 174 on most recent labs despite increasing pravastatin to 40 mg daily  Discussed switching statin but patient would prefer to increase pravastatin dose since he has a large stock pile of this at home  He will increase to 80 mg daily and will plan on rechecking lipid panel in 3 months  Will check CMP prior to his next visit in a month though in the interim  If cholesterol does not improve with 80 mg of pravastatin, should consider switching to Crestor  Overweight with body mass index (BMI) 25 0-29 9  BMI Counseling: Body mass index is 29 19 kg/m²  The BMI is above normal  Nutrition recommendations include moderation in carbohydrate intake  Chief Complaint     Chief Complaint   Patient presents with    Follow-up     3Month       History of Present Illness   Cindy Aleman is a 54y o -year-old male who presents for 3 month follow-up on chronic problems  The patient reports that current blood pressure medications include amlodipine 5 mg daily, atenolol 100 mg daily, benazepril 40 mg twice daily, and hydrochlorothiazide 25 mg daily  Blood pressure readings at home are approximately 136-142/80s  The patient denies symptoms of poor control such as chest pain, shortness of breath, leg swelling, vision changes, headaches, or dizziness  The patient reports 1-2 cups of coffee daily caffeine intake and limited salt intake  The patient continues with pravastatin 40 mg daily  Last LDL was 174 about week and half ago  The patient denies myalgias  Patient has a history impaired fasting blood sugar  His last A1c about 6 months ago was 5 3%  A1c about a week ago during recent labs was stable at 5 3%  He reports that his diet is high in carbohydrates  He has ongoing LFT elevations    Most recent labs about a week and half ago show AST of 90 and   Ultrasound done about a year ago showed a fatty liver  He has intermittent alcohol screening tests  They have so far negative  He reports that his intake is significantly less - maybe about 1 time a week - takes 7-10 days off between drinking often  - wants change in test ordered though to a cheaper one         Review of Systems   Review of Systems   Respiratory: Negative for shortness of breath  Cardiovascular: Negative for chest pain, palpitations and leg swelling  Musculoskeletal: Negative for myalgias  Neurological: Negative for dizziness and headaches         Active Problem List     Patient Active Problem List   Diagnosis    Allergic rhinitis    Muro's esophagus    Asthma, mild intermittent    Hematuria    Hiatal hernia    Hypercholesterolemia    Hypertension    Impaired fasting glucose    Sarcoidosis    Sleep apnea    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    Anxiety    Abnormal LFTs    Arm paresthesia, left    Alcohol screening    Overweight with body mass index (BMI) 25 0-29 9         Past Medical History:  Past Medical History:   Diagnosis Date    Chest pain     Sarcoidosis        Past Surgical History:  Past Surgical History:   Procedure Laterality Date    BRONCHOSCOPY      COLONOSCOPY      complete     CYSTOSCOPY      Diagnostic - Neg 2007    ESOPHAGOGASTRODUODENOSCOPY      Diagnostic        Family History:  Family History   Problem Relation Age of Onset    Colon cancer Mother         age in 35s   Dileep Flower Hyperlipidemia Mother     Hyperlipidemia Father     Pancreatic cancer Father         Adenocarcinoma of the ampulla of vater     Colon cancer Maternal Uncle     Colon cancer Other         maternal relatives - likely MGF and a second cousin in her 35s       Social History:  Social History     Socioeconomic History    Marital status: /Civil Union     Spouse name: Not on file    Number of children: Not on file    Years of education: Not on file    Highest education level: Not on file   Occupational History    Occupation:     Social Needs    Financial resource strain: Not on file    Food insecurity:     Worry: Not on file     Inability: Not on file    Transportation needs:     Medical: Not on file     Non-medical: Not on file   Tobacco Use    Smoking status: Former Smoker     Types: Cigarettes     Last attempt to quit:      Years since quittin 9    Smokeless tobacco: Former User    Tobacco comment: smoked about 1 pack every 2 weeks for 2 years in his 19's    Substance and Sexual Activity    Alcohol use: Yes     Comment: reports down to 5 drinks once weekly    Drug use: No    Sexual activity: Not on file   Lifestyle    Physical activity:     Days per week: Not on file     Minutes per session: Not on file    Stress: Not on file   Relationships    Social connections:     Talks on phone: Not on file     Gets together: Not on file     Attends Protestant service: Not on file     Active member of club or organization: Not on file     Attends meetings of clubs or organizations: Not on file     Relationship status: Not on file    Intimate partner violence:     Fear of current or ex partner: Not on file     Emotionally abused: Not on file     Physically abused: Not on file     Forced sexual activity: Not on file   Other Topics Concern    Not on file   Social History Narrative    Lives home with wife and daughter        Objective     Vitals:    19 1043 19 1132   BP: 142/82 136/100   Pulse: 80    Resp: 18    SpO2: 95%    Weight: 92 5 kg (204 lb)    Height: 5' 10 1" (1 781 m)      Wt Readings from Last 3 Encounters:   19 92 5 kg (204 lb)   19 89 1 kg (196 lb 8 oz)   19 85 7 kg (189 lb)       Physical Exam   Constitutional: He appears well-developed and well-nourished  No distress  HENT:   Head: Normocephalic and atraumatic  Neck: Neck supple  No thyromegaly present  Cardiovascular: Normal rate, regular rhythm, normal heart sounds and intact distal pulses  No murmur heard  No carotid bruits noted   Pulmonary/Chest: Effort normal and breath sounds normal  He has no wheezes  He has no rales  Abdominal: Soft  Bowel sounds are normal  He exhibits no distension  There is no hepatomegaly  There is no tenderness  There is no guarding  Musculoskeletal: He exhibits no edema  Lymphadenopathy:     He has no cervical adenopathy  Neurological: He is alert  Skin: Skin is warm and dry  Psychiatric: He has a normal mood and affect  Vitals reviewed        Pertinent Laboratory/Diagnostic Studies:  Lab Results   Component Value Date    GLUCOSE 99 07/25/2014    BUN 19 12/14/2019    CREATININE 1 13 12/14/2019    CALCIUM 10 0 12/14/2019     07/25/2014    K 3 8 12/14/2019    CO2 29 12/14/2019    CL 99 (L) 12/14/2019     Lab Results   Component Value Date     (H) 12/14/2019    AST 90 (H) 12/14/2019    ALKPHOS 46 12/14/2019    BILITOT 0 66 07/25/2014       Lab Results   Component Value Date    WBC 7 82 07/25/2014    HGB 16 6 07/25/2014    HCT 47 6 07/25/2014    MCV 88 07/25/2014     07/25/2014     Lab Results   Component Value Date    CHOL 239 07/25/2014     Lab Results   Component Value Date    TRIG 299 (H) 12/14/2019     Lab Results   Component Value Date    HDL 36 (L) 12/14/2019     Lab Results   Component Value Date    LDLCALC 174 (H) 12/14/2019     Lab Results   Component Value Date    HGBA1C 5 3 12/14/2019       Results for orders placed or performed in visit on 12/14/19   Alcohol, Ethyl, Qn, Random Urine   Result Value Ref Range    Ethanol, Ur None Detected mg/dL         ALLERGIES:  Allergies   Allergen Reactions    Other      seasonal       Current Medications     Current Outpatient Medications   Medication Sig Dispense Refill    albuterol (PROAIR HFA) 90 mcg/act inhaler Inhale 2 puffs every 6 (six) hours as needed      amLODIPine (NORVASC) 10 mg tablet Take 1 tablet (10 mg total) by mouth daily 90 tablet 1    atenolol (TENORMIN) 100 mg tablet TAKE 1 TABLET (100 MG TOTAL) BY MOUTH DAILY 30 tablet 5    benazepril (LOTENSIN) 40 MG tablet TAKE 1 TABLET BY MOUTH TWICE A DAY 60 tablet 5    esomeprazole (NexIUM) 40 MG capsule Take 40 mg by mouth 2 (two) times a day    0    fluticasone (FLONASE) 50 mcg/act nasal spray 1 spray into each nostril daily as needed for rhinitis      hydrochlorothiazide (HYDRODIURIL) 25 mg tablet TAKE 1 TABLET BY MOUTH EVERY DAY 90 tablet 3    Milk Thistle 250 MG CAPS Take 1 caplet by mouth daily      pravastatin (PRAVACHOL) 40 mg tablet Take 2 tablets (80 mg total) by mouth daily at bedtime       No current facility-administered medications for this visit            Health Maintenance     Health Maintenance   Topic Date Due    Pneumococcal Vaccine: Pediatrics (0 to 5 Years) and At-Risk Patients (6 to 59 Years) (1 of 1 - PPSV23) 02/21/1978    HIV Screening  02/21/1987    BMI: Followup Plan  03/01/2020    Depression Screening PHQ  12/24/2020    BMI: Adult  12/24/2020    CRC Screening: Colonoscopy  12/11/2021    DTaP,Tdap,and Td Vaccines (3 - Td) 01/11/2027    Pneumococcal Vaccine: 65+ Years (1 of 2 - PCV13) 02/21/2037    Influenza Vaccine  Completed    HIB Vaccine  Aged Out    Hepatitis B Vaccine  Aged Out    IPV Vaccine  Aged Out    Hepatitis A Vaccine  Aged Out    Meningococcal ACWY Vaccine  Aged Out    HPV Vaccine  Aged Out     Immunization History   Administered Date(s) Administered    INFLUENZA 10/12/2018    Influenza Quadrivalent Preservative Free 3 years and older IM 10/01/2017    Influenza TIV (IM) 01/02/2007, 11/08/2015    Influenza, recombinant, quadrivalent,injectable, preservative free 09/06/2019    Tdap 11/30/2015, 01/11/2017       Willy Joel PA-C  12/24/2019 12:50 PM  [facility]

## 2019-12-24 NOTE — ASSESSMENT & PLAN NOTE
Patient still has LDL of 174 on most recent labs despite increasing pravastatin to 40 mg daily  Discussed switching statin but patient would prefer to increase pravastatin dose since he has a large stock pile of this at home  He will increase to 80 mg daily and will plan on rechecking lipid panel in 3 months  Will check CMP prior to his next visit in a month though in the interim  If cholesterol does not improve with 80 mg of pravastatin, should consider switching to Crestor

## 2019-12-24 NOTE — ASSESSMENT & PLAN NOTE
BMI Counseling: Body mass index is 29 19 kg/m²  The BMI is above normal  Nutrition recommendations include moderation in carbohydrate intake

## 2019-12-24 NOTE — ASSESSMENT & PLAN NOTE
Reviewed that his fasting blood sugar was again mildly elevated on recent labs done about a week and half ago  His A1c that was normal at 5 3%  He was encouraged to limit intake of carbohydrates and will continue to monitor

## 2020-01-08 LAB — ETHANOL UR QL: NEGATIVE

## 2020-01-25 LAB — ETHANOL UR QL: NEGATIVE

## 2020-01-31 DIAGNOSIS — I10 HYPERTENSION, UNSPECIFIED TYPE: ICD-10-CM

## 2020-01-31 RX ORDER — ATENOLOL 100 MG/1
100 TABLET ORAL DAILY
Qty: 30 TABLET | Refills: 0 | Status: SHIPPED | OUTPATIENT
Start: 2020-01-31 | End: 2020-02-14 | Stop reason: SDUPTHER

## 2020-02-01 ENCOUNTER — APPOINTMENT (OUTPATIENT)
Dept: LAB | Facility: MEDICAL CENTER | Age: 50
End: 2020-02-01
Payer: COMMERCIAL

## 2020-02-01 DIAGNOSIS — R79.89 ABNORMAL LFTS: ICD-10-CM

## 2020-02-01 DIAGNOSIS — I10 ESSENTIAL HYPERTENSION: ICD-10-CM

## 2020-02-01 LAB
ALBUMIN SERPL BCP-MCNC: 4.3 G/DL (ref 3.5–5)
ALP SERPL-CCNC: 55 U/L (ref 46–116)
ALT SERPL W P-5'-P-CCNC: 192 U/L (ref 12–78)
ANION GAP SERPL CALCULATED.3IONS-SCNC: 3 MMOL/L (ref 4–13)
AST SERPL W P-5'-P-CCNC: 99 U/L (ref 5–45)
BILIRUB SERPL-MCNC: 1.07 MG/DL (ref 0.2–1)
BUN SERPL-MCNC: 19 MG/DL (ref 5–25)
CALCIUM SERPL-MCNC: 10 MG/DL (ref 8.3–10.1)
CHLORIDE SERPL-SCNC: 100 MMOL/L (ref 100–108)
CO2 SERPL-SCNC: 30 MMOL/L (ref 21–32)
CREAT SERPL-MCNC: 1.03 MG/DL (ref 0.6–1.3)
GFR SERPL CREATININE-BSD FRML MDRD: 86 ML/MIN/1.73SQ M
GLUCOSE P FAST SERPL-MCNC: 114 MG/DL (ref 65–99)
POTASSIUM SERPL-SCNC: 3.8 MMOL/L (ref 3.5–5.3)
PROT SERPL-MCNC: 8.4 G/DL (ref 6.4–8.2)
SODIUM SERPL-SCNC: 133 MMOL/L (ref 136–145)

## 2020-02-01 PROCEDURE — 80053 COMPREHEN METABOLIC PANEL: CPT

## 2020-02-01 PROCEDURE — 36415 COLL VENOUS BLD VENIPUNCTURE: CPT

## 2020-02-02 RX ORDER — HYDROCHLOROTHIAZIDE 25 MG/1
TABLET ORAL
Qty: 90 TABLET | Refills: 1 | Status: SHIPPED | OUTPATIENT
Start: 2020-02-02 | End: 2020-10-05

## 2020-02-05 LAB — ETHANOL UR-MCNC: NORMAL MG/DL

## 2020-02-12 NOTE — PROGRESS NOTES
FAMILY PRACTICE OFFICE VISIT  [facility] - Formerly [facility], later [facility]       NAME: Moreno Trent  AGE: 54 y o  SEX: male       : 1970        MRN: [id number]    DATE: 2/15/2020  TIME: 11:07 PM    Assessment and Plan     Problem List Items Addressed This Visit        Endocrine    Impaired fasting glucose     Patient reminded to limit intake of carbohydrates  Recheck A1c prior to next visit  Relevant Orders    Comprehensive metabolic panel    Hemoglobin A1C       Cardiovascular and Mediastinum    Hypertension - Primary     Well controlled now  Continue with amlodipine 10 mg daily, atenolol 100 mg daily, benazepril 40 mg twice daily, and hydrochlorothiazide 25 mg daily  Relevant Medications    atenolol (TENORMIN) 100 mg tablet    benazepril (LOTENSIN) 40 MG tablet       Other    Hypercholesterolemia     Patient currently on pravastatin 80 mg daily  Recheck lipid panel prior to next visit  Relevant Orders    Lipid Panel with Direct LDL reflex    Overweight with body mass index (BMI) 25 0-29 9     Encouraged to continue working on improving diet, limiting or avoiding alcohol, and exercising regularly  We will reassess at next visit  BMI Counseling: Body mass index is 28 38 kg/m²  The BMI is above normal  Nutrition recommendations include 3-5 servings of fruits/vegetables daily  Exercise recommendations include moderate aerobic physical activity for 150 minutes/week  Other Visit Diagnoses     Screening for HIV (human immunodeficiency virus)        Relevant Orders    Human Immunodeficiency Virus 1/2 Antigen / Antibody ( Fourth Generation) with Reflex Testing      The USPSTF recommendation for HIV screening in all patients between 13and 72years old (once in lifetime or annually with risk factors) was discussed with the patient  The patient agreed to testing  Impaired fasting glucose  Patient reminded to limit intake of carbohydrates  Recheck A1c prior to next visit  Hypertension  Well controlled now  Continue with amlodipine 10 mg daily, atenolol 100 mg daily, benazepril 40 mg twice daily, and hydrochlorothiazide 25 mg daily  Hypercholesterolemia  Patient currently on pravastatin 80 mg daily  Recheck lipid panel prior to next visit  Overweight with body mass index (BMI) 25 0-29 9  Encouraged to continue working on improving diet, limiting or avoiding alcohol, and exercising regularly  We will reassess at next visit  BMI Counseling: Body mass index is 28 38 kg/m²  The BMI is above normal  Nutrition recommendations include 3-5 servings of fruits/vegetables daily  Exercise recommendations include moderate aerobic physical activity for 150 minutes/week  Chief Complaint     Chief Complaint   Patient presents with    Follow-up     HTN        History of Present Illness   Orpha Alexandru is a 54y o -year-old male who presents for 6 week follow-up on hypertension  Patient's blood pressure was uncontrolled at his last visit  He reported borderline readings at home as well  His amlodipine was increased to 10 mg daily and he was encouraged to continue with atenolol 100 mg daily, benazepril 40 mg twice daily, and hydrochlorothiazide 25 mg daily  Blood pressure readings at home are approximately 125-135/87-93  The patient denies symptoms of poor control such as chest pain, shortness of breath, leg swelling, vision changes, headaches, or dizziness  The patient reports 1 cup of coffee daily caffeine intake and unlimited salt intake      Patient notes that he has been getting floaters - notes that they look like floaters - notes that he plans to follow-up soon with Ophthalmology - last saw them almost a year ago - notes that he sees the strand wiggle in his vision about 4 times a week    Went to GI a few days ago - was sent for labs that he will have done today - notes that he will also be getting a fibroscan        Review of Systems   Review of Systems   Constitutional: Negative for chills and fever  Eyes: Positive for visual disturbance  Respiratory: Negative for shortness of breath  Cardiovascular: Negative for chest pain, palpitations and leg swelling  Neurological: Negative for dizziness and headaches         Active Problem List     Patient Active Problem List   Diagnosis    Allergic rhinitis    Muro's esophagus    Asthma, mild intermittent    Hematuria    Hiatal hernia    Hypercholesterolemia    Hypertension    Impaired fasting glucose    Sarcoidosis    Sleep apnea    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    Anxiety    Abnormal LFTs    Arm paresthesia, left    Alcohol screening    Overweight with body mass index (BMI) 25 0-29 9         Past Medical History:  Past Medical History:   Diagnosis Date    Chest pain     Sarcoidosis        Past Surgical History:  Past Surgical History:   Procedure Laterality Date    BRONCHOSCOPY      COLONOSCOPY      complete     CYSTOSCOPY      Diagnostic - Neg 2007    ESOPHAGOGASTRODUODENOSCOPY      Diagnostic        Family History:  Family History   Problem Relation Age of Onset    Colon cancer Mother         age in 35s   [place] Hyperlipidemia Mother     Hyperlipidemia Father     Pancreatic cancer Father         Adenocarcinoma of the ampulla of vater     Colon cancer Maternal Uncle     Colon cancer Other         maternal relatives - likely MGF and a second cousin in her 35s       Social History:  Social History     Socioeconomic History    Marital status: /Civil Union     Spouse name: Not on file    Number of children: Not on file    Years of education: Not on file    Highest education level: Not on file   Occupational History    Occupation:     Social Needs    Financial resource strain: Not on file    Food insecurity:     Worry: Not on file     Inability: Not on file   thePlatform needs: Medical: Not on file     Non-medical: Not on file   Tobacco Use    Smoking status: Former Smoker     Types: Cigarettes     Last attempt to quit:      Years since quittin 1    Smokeless tobacco: Former User    Tobacco comment: smoked about 1 pack every 2 weeks for 2 years in his 19's    Substance and Sexual Activity    Alcohol use: Yes     Comment: reports down to 5 drinks once weekly    Drug use: No    Sexual activity: Not on file   Lifestyle    Physical activity:     Days per week: Not on file     Minutes per session: Not on file    Stress: Not on file   Relationships    Social connections:     Talks on phone: Not on file     Gets together: Not on file     Attends Zoroastrianism service: Not on file     Active member of club or organization: Not on file     Attends meetings of clubs or organizations: Not on file     Relationship status: Not on file    Intimate partner violence:     Fear of current or ex partner: Not on file     Emotionally abused: Not on file     Physically abused: Not on file     Forced sexual activity: Not on file   Other Topics Concern    Not on file   Social History Narrative    Lives home with wife and daughter        Objective     Vitals:    20 1049   BP: 122/80   BP Location: Left arm   Patient Position: Sitting   Cuff Size: Large   Pulse: 76   SpO2: 97%   Weight: 90 kg (198 lb 6 oz)   Height: 5' 10 1" (1 781 m)     Wt Readings from Last 3 Encounters:   20 90 kg (198 lb 6 oz)   19 92 5 kg (204 lb)   19 89 1 kg (196 lb 8 oz)       Physical Exam   Constitutional: He appears well-developed and well-nourished  No distress  HENT:   Head: Normocephalic and atraumatic  Neck: Neck supple  No thyromegaly present  Cardiovascular: Normal rate, regular rhythm, normal heart sounds and intact distal pulses  No murmur heard  No carotid bruits noted   Pulmonary/Chest: Effort normal and breath sounds normal  He has no wheezes  He has no rales  Musculoskeletal: He exhibits no edema  Lymphadenopathy:     He has no cervical adenopathy  Neurological: He is alert  Psychiatric: He has a normal mood and affect  Vitals reviewed        Pertinent Laboratory/Diagnostic Studies:  Lab Results   Component Value Date    GLUCOSE 99 07/25/2014    BUN 19 02/01/2020    CREATININE 1 03 02/01/2020    CALCIUM 10 0 02/01/2020     07/25/2014    K 3 8 02/01/2020    CO2 30 02/01/2020     02/01/2020     Lab Results   Component Value Date     (H) 02/01/2020    AST 99 (H) 02/01/2020    ALKPHOS 55 02/01/2020    BILITOT 0 66 07/25/2014       Lab Results   Component Value Date    WBC 7 82 07/25/2014    HGB 16 6 07/25/2014    HCT 47 6 07/25/2014    MCV 88 07/25/2014     07/25/2014     Lab Results   Component Value Date    CHOL 239 07/25/2014     Lab Results   Component Value Date    TRIG 299 (H) 12/14/2019     Lab Results   Component Value Date    HDL 36 (L) 12/14/2019     Lab Results   Component Value Date    LDLCALC 174 (H) 12/14/2019     Lab Results   Component Value Date    HGBA1C 5 3 12/14/2019       Results for orders placed or performed in visit on 02/12/20   Alcohol, Ethyl, Qualitative, without Confirmation, Urine   Result Value Ref Range    Please Note:      Alcohol, Ethyl (U) NEGATIVE     COMMENT             ALLERGIES:  Allergies   Allergen Reactions    Other      seasonal       Current Medications     Current Outpatient Medications   Medication Sig Dispense Refill    albuterol (PROAIR HFA) 90 mcg/act inhaler Inhale 2 puffs every 6 (six) hours as needed      atenolol (TENORMIN) 100 mg tablet Take 1 tablet (100 mg total) by mouth daily 90 tablet 1    benazepril (LOTENSIN) 40 MG tablet Take 1 tablet (40 mg total) by mouth 2 (two) times a day 180 tablet 1    esomeprazole (NexIUM) 40 MG capsule Take 40 mg by mouth 2 (two) times a day    0    fluticasone (FLONASE) 50 mcg/act nasal spray 1 spray into each nostril daily as needed for rhinitis  hydrochlorothiazide (HYDRODIURIL) 25 mg tablet TAKE 1 TABLET BY MOUTH EVERY DAY 90 tablet 1    Milk Thistle 250 MG CAPS Take 1 caplet by mouth daily      pravastatin (PRAVACHOL) 40 mg tablet Take 2 tablets (80 mg total) by mouth daily at bedtime      amLODIPine (NORVASC) 10 mg tablet Take 1 tablet (10 mg total) by mouth daily 90 tablet 1     No current facility-administered medications for this visit            Health Maintenance     Health Maintenance   Topic Date Due    Pneumococcal Vaccine: Pediatrics (0 to 5 Years) and At-Risk Patients (6 to 59 Years) (1 of 1 - PPSV23) 02/21/1978    HIV Screening  02/21/1987    Annual Physical  03/01/2020    BMI: Followup Plan  12/24/2020    Depression Screening PHQ  02/14/2021    BMI: Adult  02/14/2021    CRC Screening: Colonoscopy  12/11/2021    DTaP,Tdap,and Td Vaccines (3 - Td) 01/11/2027    Pneumococcal Vaccine: 65+ Years (1 of 2 - PCV13) 02/21/2037    Influenza Vaccine  Completed    HIB Vaccine  Aged Out    Hepatitis B Vaccine  Aged Out    IPV Vaccine  Aged Out    Hepatitis A Vaccine  Aged Out    Meningococcal ACWY Vaccine  Aged Out    HPV Vaccine  Aged Out     Immunization History   Administered Date(s) Administered    INFLUENZA 10/12/2018    Influenza Quadrivalent Preservative Free 3 years and older IM 10/01/2017    Influenza TIV (IM) 01/02/2007, 11/08/2015    Influenza, recombinant, quadrivalent,injectable, preservative free 09/06/2019    Tdap 11/30/2015, 01/11/2017       Xavier Solorzano PA-C  2/15/2020 11:07 PM  [place] [id number] [facility]

## 2020-02-13 LAB — ETHANOL UR QL: NEGATIVE

## 2020-02-14 ENCOUNTER — OFFICE VISIT (OUTPATIENT)
Dept: FAMILY MEDICINE CLINIC | Facility: CLINIC | Age: 50
End: 2020-02-14
Payer: COMMERCIAL

## 2020-02-14 VITALS
HEART RATE: 76 BPM | WEIGHT: 198.38 LBS | BODY MASS INDEX: 28.4 KG/M2 | DIASTOLIC BLOOD PRESSURE: 80 MMHG | OXYGEN SATURATION: 97 % | SYSTOLIC BLOOD PRESSURE: 122 MMHG | HEIGHT: 70 IN

## 2020-02-14 DIAGNOSIS — I10 ESSENTIAL HYPERTENSION: ICD-10-CM

## 2020-02-14 DIAGNOSIS — E78.00 HYPERCHOLESTEROLEMIA: ICD-10-CM

## 2020-02-14 DIAGNOSIS — E66.3 OVERWEIGHT WITH BODY MASS INDEX (BMI) 25.0-29.9: ICD-10-CM

## 2020-02-14 DIAGNOSIS — R73.01 IMPAIRED FASTING GLUCOSE: ICD-10-CM

## 2020-02-14 DIAGNOSIS — I10 HYPERTENSION, UNSPECIFIED TYPE: Primary | ICD-10-CM

## 2020-02-14 DIAGNOSIS — Z11.4 SCREENING FOR HIV (HUMAN IMMUNODEFICIENCY VIRUS): ICD-10-CM

## 2020-02-14 PROCEDURE — 1036F TOBACCO NON-USER: CPT | Performed by: PHYSICIAN ASSISTANT

## 2020-02-14 PROCEDURE — 99214 OFFICE O/P EST MOD 30 MIN: CPT | Performed by: PHYSICIAN ASSISTANT

## 2020-02-14 PROCEDURE — 3074F SYST BP LT 130 MM HG: CPT | Performed by: PHYSICIAN ASSISTANT

## 2020-02-14 PROCEDURE — 3079F DIAST BP 80-89 MM HG: CPT | Performed by: PHYSICIAN ASSISTANT

## 2020-02-14 RX ORDER — BENAZEPRIL HYDROCHLORIDE 40 MG/1
40 TABLET, FILM COATED ORAL 2 TIMES DAILY
Qty: 180 TABLET | Refills: 1 | Status: SHIPPED | OUTPATIENT
Start: 2020-02-14 | End: 2020-12-16 | Stop reason: SDUPTHER

## 2020-02-14 RX ORDER — ATENOLOL 100 MG/1
100 TABLET ORAL DAILY
Qty: 90 TABLET | Refills: 1 | Status: SHIPPED | OUTPATIENT
Start: 2020-02-14 | End: 2020-03-23

## 2020-02-16 NOTE — ASSESSMENT & PLAN NOTE
Well controlled now  Continue with amlodipine 10 mg daily, atenolol 100 mg daily, benazepril 40 mg twice daily, and hydrochlorothiazide 25 mg daily

## 2020-02-16 NOTE — ASSESSMENT & PLAN NOTE
Encouraged to continue working on improving diet, limiting or avoiding alcohol, and exercising regularly  We will reassess at next visit  BMI Counseling: Body mass index is 28 38 kg/m²  The BMI is above normal  Nutrition recommendations include 3-5 servings of fruits/vegetables daily  Exercise recommendations include moderate aerobic physical activity for 150 minutes/week

## 2020-03-03 LAB — ETHANOL BLD GC-MCNC: NORMAL MG/DL

## 2020-03-21 DIAGNOSIS — I10 HYPERTENSION, UNSPECIFIED TYPE: ICD-10-CM

## 2020-03-23 RX ORDER — ATENOLOL 100 MG/1
TABLET ORAL
Qty: 30 TABLET | Refills: 5 | Status: SHIPPED | OUTPATIENT
Start: 2020-03-23 | End: 2020-07-18

## 2020-06-01 DIAGNOSIS — I10 ESSENTIAL HYPERTENSION: Primary | ICD-10-CM

## 2020-06-02 RX ORDER — AMLODIPINE BESYLATE 10 MG/1
10 TABLET ORAL DAILY
Qty: 90 TABLET | Refills: 1 | Status: SHIPPED | OUTPATIENT
Start: 2020-06-02 | End: 2020-12-08 | Stop reason: SDUPTHER

## 2020-06-02 RX ORDER — AMLODIPINE BESYLATE 5 MG/1
TABLET ORAL
Qty: 30 TABLET | Refills: 5 | OUTPATIENT
Start: 2020-06-02

## 2020-06-26 ENCOUNTER — APPOINTMENT (OUTPATIENT)
Dept: LAB | Facility: CLINIC | Age: 50
End: 2020-06-26
Payer: COMMERCIAL

## 2020-06-26 ENCOUNTER — TRANSCRIBE ORDERS (OUTPATIENT)
Dept: LAB | Facility: CLINIC | Age: 50
End: 2020-06-26

## 2020-06-26 DIAGNOSIS — E78.00 HYPERCHOLESTEROLEMIA: ICD-10-CM

## 2020-06-26 DIAGNOSIS — R74.01 ELEVATED TRANSAMINASE LEVEL: Primary | ICD-10-CM

## 2020-06-26 DIAGNOSIS — Z11.4 SCREENING FOR HIV (HUMAN IMMUNODEFICIENCY VIRUS): ICD-10-CM

## 2020-06-26 DIAGNOSIS — R73.01 IMPAIRED FASTING GLUCOSE: ICD-10-CM

## 2020-06-26 DIAGNOSIS — R74.01 ELEVATED TRANSAMINASE LEVEL: ICD-10-CM

## 2020-06-26 LAB
AFP-TM SERPL-MCNC: 6.8 NG/ML (ref 0.5–8)
ALBUMIN SERPL BCP-MCNC: 3.9 G/DL (ref 3.5–5)
ALP SERPL-CCNC: 58 U/L (ref 46–116)
ALT SERPL W P-5'-P-CCNC: 195 U/L (ref 12–78)
ANION GAP SERPL CALCULATED.3IONS-SCNC: 8 MMOL/L (ref 4–13)
AST SERPL W P-5'-P-CCNC: 113 U/L (ref 5–45)
BILIRUB SERPL-MCNC: 1.14 MG/DL (ref 0.2–1)
BUN SERPL-MCNC: 19 MG/DL (ref 5–25)
CALCIUM SERPL-MCNC: 9.5 MG/DL (ref 8.3–10.1)
CHLORIDE SERPL-SCNC: 101 MMOL/L (ref 100–108)
CHOLEST SERPL-MCNC: 282 MG/DL (ref 50–200)
CO2 SERPL-SCNC: 24 MMOL/L (ref 21–32)
CREAT SERPL-MCNC: 0.96 MG/DL (ref 0.6–1.3)
EST. AVERAGE GLUCOSE BLD GHB EST-MCNC: 97 MG/DL
GFR SERPL CREATININE-BSD FRML MDRD: 93 ML/MIN/1.73SQ M
GLUCOSE P FAST SERPL-MCNC: 119 MG/DL (ref 65–99)
HBA1C MFR BLD: 5 %
HBV CORE AB SER QL: NORMAL
HBV CORE IGM SER QL: NORMAL
HBV SURFACE AB SER-ACNC: <3.1 MIU/ML
HBV SURFACE AG SER QL: NORMAL
HCV AB SER QL: NORMAL
HDLC SERPL-MCNC: 42 MG/DL
INR PPP: 0.93 (ref 0.84–1.19)
IRON SERPL-MCNC: 132 UG/DL (ref 65–175)
LDLC SERPL CALC-MCNC: 177 MG/DL (ref 0–100)
POTASSIUM SERPL-SCNC: 3.6 MMOL/L (ref 3.5–5.3)
PROT SERPL-MCNC: 8.1 G/DL (ref 6.4–8.2)
PROTHROMBIN TIME: 12.5 SECONDS (ref 11.6–14.5)
SODIUM SERPL-SCNC: 133 MMOL/L (ref 136–145)
TIBC SERPL-MCNC: 414 UG/DL (ref 250–450)
TRIGL SERPL-MCNC: 315 MG/DL

## 2020-06-26 PROCEDURE — 86704 HEP B CORE ANTIBODY TOTAL: CPT

## 2020-06-26 PROCEDURE — 86705 HEP B CORE ANTIBODY IGM: CPT

## 2020-06-26 PROCEDURE — 82103 ALPHA-1-ANTITRYPSIN TOTAL: CPT

## 2020-06-26 PROCEDURE — 83540 ASSAY OF IRON: CPT

## 2020-06-26 PROCEDURE — 86235 NUCLEAR ANTIGEN ANTIBODY: CPT

## 2020-06-26 PROCEDURE — 83036 HEMOGLOBIN GLYCOSYLATED A1C: CPT

## 2020-06-26 PROCEDURE — 82105 ALPHA-FETOPROTEIN SERUM: CPT

## 2020-06-26 PROCEDURE — 87340 HEPATITIS B SURFACE AG IA: CPT

## 2020-06-26 PROCEDURE — 86256 FLUORESCENT ANTIBODY TITER: CPT

## 2020-06-26 PROCEDURE — 87389 HIV-1 AG W/HIV-1&-2 AB AG IA: CPT

## 2020-06-26 PROCEDURE — 86706 HEP B SURFACE ANTIBODY: CPT

## 2020-06-26 PROCEDURE — 86803 HEPATITIS C AB TEST: CPT

## 2020-06-26 PROCEDURE — 36415 COLL VENOUS BLD VENIPUNCTURE: CPT

## 2020-06-26 PROCEDURE — 80061 LIPID PANEL: CPT

## 2020-06-26 PROCEDURE — 85610 PROTHROMBIN TIME: CPT

## 2020-06-26 PROCEDURE — 82390 ASSAY OF CERULOPLASMIN: CPT

## 2020-06-26 PROCEDURE — 83550 IRON BINDING TEST: CPT

## 2020-06-26 PROCEDURE — 80053 COMPREHEN METABOLIC PANEL: CPT

## 2020-06-27 LAB
A1AT SERPL-MCNC: 139 MG/DL (ref 101–187)
ACTIN IGG SERPL-ACNC: 6 UNITS (ref 0–19)
CERULOPLASMIN SERPL-MCNC: 32.3 MG/DL (ref 16–31)
MITOCHONDRIA M2 IGG SER-ACNC: <20 UNITS (ref 0–20)

## 2020-06-29 LAB — HIV 1+2 AB+HIV1 P24 AG SERPL QL IA: NORMAL

## 2020-07-09 NOTE — ASSESSMENT & PLAN NOTE
The patient was also started on sertraline 25 mg daily  He will return in about 1 month and we will assess how he is doing with medication  He should call with any problems or concerns in the interim 
We reviewed his recent results from the lipid panel as well as his CT coronary calcium score  His coronary calcium score was 3 and did not show any significant buildup in his vessels  His lipid panel did show an elevated total cholesterol of 270 as well as an LDL of 160  His calculated ASCVD risk including his once weekly cigarette was 16 1%  We reviewed that this drops to 6 5% if he is not smoking any longer  The patient will stop smoking cigarettes  We also agreed that it was appropriate to start atorvastatin 20 mg daily given his family history  We will plan on rechecking lipid panel in 3 months with labs as ordered today 
We reviewed that his blood pressure still elevated today  We also discussed that there appears to be a significant component to his blood pressure elevations in relation to his anxiety level  His blood pressure dropped significantly after taking several deep breaths  He was directed to continue with the atenolol 100 mg daily as well as hydrochlorothiazide 12 5 mg daily  His benazepril was increased to 40 mg twice daily  He will return in 1 month for recheck  He was encouraged to continue checking his blood pressure at home  He was encouraged to bring his blood pressure monitor to his next appointment to verify its accuracy 
negative

## 2020-07-16 NOTE — PROGRESS NOTES
[address] [facility]    NAME: Elina Paiz  AGE: 52 y o  SEX: male  : 1970     DATE: 2020     Assessment and Plan:     Problem List Items Addressed This Visit        Digestive    Muro's esophagus     Patient will continue with Nexium 40 mg twice daily  He will be due for his next EGD at the end of next year  Endocrine    Impaired fasting glucose     Reviewed that his A1c was normal last month at 5 0%  He does note having fatigue after eating though so will check glucose tolerance test to evaluate his ability to handle carbohydrates  Relevant Orders    Glucose MICHELLE 2HR 75GM Nonpreg       Cardiovascular and Mediastinum    Hypertension     Well controlled  Continue with amlodipine 10 mg daily, atenolol 100 mg daily, benazepril 40 mg twice daily, and HCTZ 25 mg daily  Will continue to monitor  Other    Hypercholesterolemia     Patient was not benefitting from pravastatin 80 mg daily  Will switch to Crestor 40 mg daily and recheck labs prior to next visit in 3 months  Relevant Medications    rosuvastatin (CRESTOR) 40 MG tablet    Other Relevant Orders    Lipid Panel with Direct LDL reflex    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     Relating to his divorce and strained relationship with his daughters  Stable  He will continue to see his therapist regularly  No medication currently needed  Will continue to monitor  Abnormal LFTs     Fatty liver noted on ultrasound previously  Encouraged to limit intake of alcohol  Patient will continue to follow with Dr Andrew Irby  Relevant Orders    Comprehensive metabolic panel    Overweight     BMI Counseling: Body mass index is 28 76 kg/m²  The BMI is above normal  Nutrition recommendations include moderation in carbohydrate intake and reducing intake of saturated fat and trans fat   Exercise recommendations include exercising 3-5 times per week  Fatigue     Feeling need for nap after he eats  Will check labs as ordered today  Consider sleep eval if labs are non-contributory  Relevant Orders    CBC and differential    TSH, 3rd generation with Free T4 reflex    Glucose MICHELLE 2HR 75GM Nonpreg      Other Visit Diagnoses     Annual physical exam    -  Primary          Immunizations and preventive care screenings were discussed with patient today  Appropriate education was printed on patient's after visit summary  Counseling:  · Exercise: the importance of regular exercise/physical activity was discussed  Recommend exercise 3-5 times per week for at least 30 minutes  Return in about 3 months (around 10/17/2020) for [address] In Office  Chief Complaint:     Chief Complaint   Patient presents with    Annual Exam      History of Present Illness:     Adult Annual Physical   Patient here for a comprehensive physical exam  The patient reports problems - as below  Patient has a history of impaired fasting blood sugar  His last A1c was 5 0% last month  His diet is low in carbohydrates  The patient reports that current blood pressure medications include amlodipine 10 mg daily, atenolol 100 mg daily, benazepril 40 mg twice daily, and HCTZ 25 mg daily  Blood pressure readings at home are approximately 120/70s  The patient denies symptoms of poor control such as chest pain, shortness of breath, leg swelling, vision changes, headaches, or dizziness  The patient reports irregular caffeine intake and limited salt intake  The patient continues with pravastatin 80 milligrams daily  Last LDL was 177 last month  The patient denies myalgias  Since the patent's last visit, weight has increased about 3 pounds  Diet is reported to be balanced but low in produce  Exercise occurs 3 times per week for about 30 minutes of walking per session       The patient reports that recently anxiety/depression level has been stable  He is not on any anxiety or depression medication  Care team includes a therapist, Afshan Hein  The patient denies panic attacks  Today's PHQ2 score was 1  He notes that he has been getting tired after eating meals lately  It used to happen with larger meals but seems to be more frequent now  He notes that he tries not to overeat  He had scrambled eggs this morning and was okay but yesterday had a stuffed tomato and stuffed pepper for lunch and was symptomatic  He wonders if it is from exercising less with the pandemic  He had it the other day after pizza slice for lunch  He notes the fatigue within 15 minutes of eating  If able to, he lays down to take a nap for an hour  He also notes feeling tired in general  He has some restless nights but usually sleeps about 6-8 hours  He feels rested upon waking  Of note, he had follow-up with GI (Dr Radha Molina) regarding his elevated LFTs and fatty liver  He reports having had a Fibroscan and states that it scored his steatosis at 3/4 and fibrosis at 2/4  Diet and Physical Activity  · Diet/Nutrition: well balanced diet and limited fruits/vegetables  · Exercise: walking, 3-4 times a week on average and as above  Depression Screening  PHQ-9 Depression Screening    PHQ-9:    Frequency of the following problems over the past two weeks:       Little interest or pleasure in doing things:  0 - not at all  Feeling down, depressed, or hopeless:  1 - several days  PHQ-2 Score:  1       General Health  · Sleep: as above  · Hearing: normal - bilateral   · Vision: goes for regular eye exams and wears glasses  · Dental: regular dental visits   Health  · Symptoms include: none     Review of Systems:     Review of Systems   Constitutional: Negative for chills and fever  HENT: Negative for rhinorrhea and sore throat  Eyes: Negative for visual disturbance     Respiratory: Negative for cough, shortness of breath and wheezing  Cardiovascular: Negative for chest pain, palpitations and leg swelling  Gastrointestinal: Positive for nausea (the morning with acid reflux)  Negative for abdominal pain, constipation, diarrhea and vomiting  Endocrine: Negative for polydipsia and polyuria  Genitourinary: Negative for dysuria  Musculoskeletal: Negative for arthralgias and myalgias  Skin: Negative for rash  Neurological: Negative for dizziness, syncope and headaches  Hematological: Does not bruise/bleed easily  Psychiatric/Behavioral: Negative for dysphoric mood  The patient is not nervous/anxious         Past Medical History:     Past Medical History:   Diagnosis Date    Chest pain     Sarcoidosis       Past Surgical History:     Past Surgical History:   Procedure Laterality Date    BRONCHOSCOPY      COLONOSCOPY      complete     CYSTOSCOPY      Diagnostic - Neg     ESOPHAGOGASTRODUODENOSCOPY      Diagnostic       Family History:     Family History   Problem Relation Age of Onset    Colon cancer Mother         age in 35s   Zuly Pall Hyperlipidemia Mother     Hyperlipidemia Father     Pancreatic cancer Father         Adenocarcinoma of the ampulla of vater     Colon cancer Maternal Uncle     Colon cancer Other         maternal relatives - likely MGF and a second cousin in her 35s      Social History:        Social History     Socioeconomic History    Marital status: /Civil Union     Spouse name: None    Number of children: None    Years of education: None    Highest education level: None   Occupational History    Occupation:     Social Needs    Financial resource strain: None    Food insecurity:     Worry: None     Inability: None    Transportation needs:     Medical: None     Non-medical: None   Tobacco Use    Smoking status: Former Smoker     Types: Cigarettes     Last attempt to quit:      Years since quittin 5    Smokeless tobacco: Former User    Tobacco comment: smoked about 1 pack every 2 weeks for 2 years in his 19's    Substance and Sexual Activity    Alcohol use: Yes     Frequency: 2-3 times a week     Drinks per session: 3 or 4     Comment: reports down to 5 drinks once weekly    Drug use: No    Sexual activity: None   Lifestyle    Physical activity:     Days per week: None     Minutes per session: None    Stress: None   Relationships    Social connections:     Talks on phone: None     Gets together: None     Attends [employer] service: None     Active member of club or organization: None     Attends meetings of clubs or organizations: None     Relationship status: None    Intimate partner violence:     Fear of current or ex partner: None     Emotionally abused: None     Physically abused: None     Forced sexual activity: None   Other Topics Concern    None   Social History Narrative    Lives home with wife and daughter       Current Medications:     Current Outpatient Medications   Medication Sig Dispense Refill    albuterol (PROAIR HFA) 90 mcg/act inhaler Inhale 2 puffs every 6 (six) hours as needed      amLODIPine (NORVASC) 10 mg tablet Take 1 tablet (10 mg total) by mouth daily 90 tablet 1    atenolol (TENORMIN) 100 mg tablet TAKE 1 TABLET BY MOUTH EVERY DAY 30 tablet 5    benazepril (LOTENSIN) 40 MG tablet Take 1 tablet (40 mg total) by mouth 2 (two) times a day 180 tablet 1    esomeprazole (NexIUM) 40 MG capsule Take 40 mg by mouth 2 (two) times a day    0    fluticasone (FLONASE) 50 mcg/act nasal spray 1 spray into each nostril daily as needed for rhinitis      hydrochlorothiazide (HYDRODIURIL) 25 mg tablet TAKE 1 TABLET BY MOUTH EVERY DAY 90 tablet 1    Milk Thistle 250 MG CAPS Take 1 caplet by mouth daily      rosuvastatin (CRESTOR) 40 MG tablet Take 1 tablet (40 mg total) by mouth daily 30 tablet 5     No current facility-administered medications for this visit  Allergies:      Allergies   Allergen Reactions    Other      seasonal Physical Exam:     /72   Pulse 74   Temp (!) 97 °F (36 1 °C)   Resp 18   Ht 5' 10 1" (1 781 m)   Wt 91 2 kg (201 lb)   SpO2 95%   BMI 28 76 kg/m²     Physical Exam   Constitutional: He appears well-developed and well-nourished  No distress  HENT:   Head: Normocephalic and atraumatic  Right Ear: Hearing, tympanic membrane, external ear and ear canal normal    Left Ear: Hearing, tympanic membrane, external ear and ear canal normal    Nose: Nose normal    Mouth/Throat: Uvula is midline, oropharynx is clear and moist and mucous membranes are normal    Eyes: Pupils are equal, round, and reactive to light  Conjunctivae and lids are normal    Neck: Trachea normal  Neck supple  Carotid bruit is not present  No thyroid mass and no thyromegaly present  Cardiovascular: Normal rate, regular rhythm, S1 normal, S2 normal, normal heart sounds, intact distal pulses and normal pulses  No murmur heard  Pulses:       Radial pulses are 2+ on the right side, and 2+ on the left side  Posterior tibial pulses are 2+ on the right side, and 2+ on the left side  Pulmonary/Chest: Effort normal and breath sounds normal  He has no decreased breath sounds  He has no wheezes  He has no rhonchi  He has no rales  Abdominal: Soft  Normal appearance and bowel sounds are normal  He exhibits no distension  There is no splenomegaly or hepatomegaly  There is no tenderness  No hernia  Musculoskeletal: He exhibits no edema  Lymphadenopathy:     He has no cervical adenopathy  Neurological: He is alert  He has normal strength  No sensory deficit  Skin: Skin is warm, dry and intact  No rash noted  Psychiatric: He has a normal mood and affect  His speech is normal and behavior is normal    Vitals reviewed        Wisam Alvarado PA-C  [facility] PRIMARY [facility]

## 2020-07-17 ENCOUNTER — OFFICE VISIT (OUTPATIENT)
Dept: FAMILY MEDICINE CLINIC | Facility: CLINIC | Age: 50
End: 2020-07-17
Payer: COMMERCIAL

## 2020-07-17 VITALS
SYSTOLIC BLOOD PRESSURE: 120 MMHG | TEMPERATURE: 97 F | BODY MASS INDEX: 28.77 KG/M2 | DIASTOLIC BLOOD PRESSURE: 72 MMHG | WEIGHT: 201 LBS | OXYGEN SATURATION: 95 % | RESPIRATION RATE: 18 BRPM | HEART RATE: 74 BPM | HEIGHT: 70 IN

## 2020-07-17 DIAGNOSIS — E66.3 OVERWEIGHT: ICD-10-CM

## 2020-07-17 DIAGNOSIS — Z00.00 ANNUAL PHYSICAL EXAM: Primary | ICD-10-CM

## 2020-07-17 DIAGNOSIS — K22.70 BARRETT'S ESOPHAGUS WITHOUT DYSPLASIA: ICD-10-CM

## 2020-07-17 DIAGNOSIS — I10 HYPERTENSION, UNSPECIFIED TYPE: ICD-10-CM

## 2020-07-17 DIAGNOSIS — I10 ESSENTIAL HYPERTENSION: ICD-10-CM

## 2020-07-17 DIAGNOSIS — R53.83 FATIGUE, UNSPECIFIED TYPE: ICD-10-CM

## 2020-07-17 DIAGNOSIS — R73.01 IMPAIRED FASTING GLUCOSE: ICD-10-CM

## 2020-07-17 DIAGNOSIS — R79.89 ABNORMAL LFTS: ICD-10-CM

## 2020-07-17 DIAGNOSIS — F43.0 ACUTE STRESS REACTION CAUSING MIXED DISTURBANCE OF EMOTION AND CONDUCT: ICD-10-CM

## 2020-07-17 DIAGNOSIS — E78.00 HYPERCHOLESTEROLEMIA: ICD-10-CM

## 2020-07-17 PROCEDURE — 99396 PREV VISIT EST AGE 40-64: CPT | Performed by: PHYSICIAN ASSISTANT

## 2020-07-17 PROCEDURE — 3008F BODY MASS INDEX DOCD: CPT | Performed by: PHYSICIAN ASSISTANT

## 2020-07-17 PROCEDURE — 3078F DIAST BP <80 MM HG: CPT | Performed by: PHYSICIAN ASSISTANT

## 2020-07-17 PROCEDURE — 3074F SYST BP LT 130 MM HG: CPT | Performed by: PHYSICIAN ASSISTANT

## 2020-07-17 RX ORDER — ROSUVASTATIN CALCIUM 40 MG/1
40 TABLET, COATED ORAL DAILY
Qty: 30 TABLET | Refills: 5 | Status: SHIPPED | OUTPATIENT
Start: 2020-07-17 | End: 2021-01-29

## 2020-07-17 NOTE — ASSESSMENT & PLAN NOTE
Relating to his divorce and strained relationship with his daughters  Stable  He will continue to see his therapist regularly  No medication currently needed  Will continue to monitor

## 2020-07-17 NOTE — ASSESSMENT & PLAN NOTE
Patient will continue with Nexium 40 mg twice daily  He will be due for his next EGD at the end of next year

## 2020-07-17 NOTE — ASSESSMENT & PLAN NOTE
Patient was not benefitting from pravastatin 80 mg daily  Will switch to Crestor 40 mg daily and recheck labs prior to next visit in 3 months

## 2020-07-17 NOTE — ASSESSMENT & PLAN NOTE
Well controlled  Continue with amlodipine 10 mg daily, atenolol 100 mg daily, benazepril 40 mg twice daily, and HCTZ 25 mg daily  Will continue to monitor

## 2020-07-17 NOTE — ASSESSMENT & PLAN NOTE
Feeling need for nap after he eats  Will check labs as ordered today  Consider sleep eval if labs are non-contributory

## 2020-07-17 NOTE — ASSESSMENT & PLAN NOTE
Fatty liver noted on ultrasound previously  Encouraged to limit intake of alcohol  Patient will continue to follow with Dr Giovanna Moritz

## 2020-07-17 NOTE — ASSESSMENT & PLAN NOTE
BMI Counseling: Body mass index is 28 76 kg/m²  The BMI is above normal  Nutrition recommendations include moderation in carbohydrate intake and reducing intake of saturated fat and trans fat  Exercise recommendations include exercising 3-5 times per week

## 2020-07-17 NOTE — ASSESSMENT & PLAN NOTE
Reviewed that his A1c was normal last month at 5 0%  He does note having fatigue after eating though so will check glucose tolerance test to evaluate his ability to handle carbohydrates

## 2020-07-18 RX ORDER — ATENOLOL 100 MG/1
TABLET ORAL
Qty: 30 TABLET | Refills: 5 | Status: SHIPPED | OUTPATIENT
Start: 2020-07-18 | End: 2021-03-17 | Stop reason: SDUPTHER

## 2020-09-19 RX ORDER — AMLODIPINE BESYLATE 5 MG/1
TABLET ORAL
Qty: 30 TABLET | Refills: 5 | OUTPATIENT
Start: 2020-09-19

## 2020-10-05 DIAGNOSIS — I10 ESSENTIAL HYPERTENSION: ICD-10-CM

## 2020-10-05 RX ORDER — HYDROCHLOROTHIAZIDE 25 MG/1
TABLET ORAL
Qty: 30 TABLET | Refills: 5 | Status: SHIPPED | OUTPATIENT
Start: 2020-10-05 | End: 2021-03-17 | Stop reason: SDUPTHER

## 2020-10-06 DIAGNOSIS — I10 ESSENTIAL HYPERTENSION: Primary | ICD-10-CM

## 2020-10-08 RX ORDER — AMLODIPINE BESYLATE 5 MG/1
TABLET ORAL
Qty: 30 TABLET | Refills: 5 | OUTPATIENT
Start: 2020-10-08

## 2020-10-14 ENCOUNTER — APPOINTMENT (OUTPATIENT)
Dept: LAB | Facility: CLINIC | Age: 50
End: 2020-10-14
Payer: COMMERCIAL

## 2020-10-14 DIAGNOSIS — R79.89 ABNORMAL LFTS: ICD-10-CM

## 2020-10-14 DIAGNOSIS — R53.83 FATIGUE, UNSPECIFIED TYPE: ICD-10-CM

## 2020-10-14 DIAGNOSIS — E78.00 HYPERCHOLESTEROLEMIA: ICD-10-CM

## 2020-10-14 LAB
ALBUMIN SERPL BCP-MCNC: 4.3 G/DL (ref 3.5–5)
ALP SERPL-CCNC: 58 U/L (ref 46–116)
ALT SERPL W P-5'-P-CCNC: 203 U/L (ref 12–78)
ANION GAP SERPL CALCULATED.3IONS-SCNC: 7 MMOL/L (ref 4–13)
AST SERPL W P-5'-P-CCNC: 112 U/L (ref 5–45)
BASOPHILS # BLD AUTO: 0.07 THOUSANDS/ΜL (ref 0–0.1)
BASOPHILS NFR BLD AUTO: 1 % (ref 0–1)
BILIRUB SERPL-MCNC: 0.71 MG/DL (ref 0.2–1)
BUN SERPL-MCNC: 17 MG/DL (ref 5–25)
CALCIUM SERPL-MCNC: 10.5 MG/DL (ref 8.3–10.1)
CHLORIDE SERPL-SCNC: 102 MMOL/L (ref 100–108)
CHOLEST SERPL-MCNC: 200 MG/DL (ref 50–200)
CO2 SERPL-SCNC: 29 MMOL/L (ref 21–32)
CREAT SERPL-MCNC: 1 MG/DL (ref 0.6–1.3)
EOSINOPHIL # BLD AUTO: 0.16 THOUSAND/ΜL (ref 0–0.61)
EOSINOPHIL NFR BLD AUTO: 2 % (ref 0–6)
ERYTHROCYTE [DISTWIDTH] IN BLOOD BY AUTOMATED COUNT: 12.2 % (ref 11.6–15.1)
GFR SERPL CREATININE-BSD FRML MDRD: 89 ML/MIN/1.73SQ M
GLUCOSE P FAST SERPL-MCNC: 99 MG/DL (ref 65–99)
HCT VFR BLD AUTO: 47.5 % (ref 36.5–49.3)
HDLC SERPL-MCNC: 64 MG/DL
HGB BLD-MCNC: 16.5 G/DL (ref 12–17)
IMM GRANULOCYTES # BLD AUTO: 0.07 THOUSAND/UL (ref 0–0.2)
IMM GRANULOCYTES NFR BLD AUTO: 1 % (ref 0–2)
LDLC SERPL CALC-MCNC: 85 MG/DL (ref 0–100)
LYMPHOCYTES # BLD AUTO: 1.04 THOUSANDS/ΜL (ref 0.6–4.47)
LYMPHOCYTES NFR BLD AUTO: 13 % (ref 14–44)
MCH RBC QN AUTO: 33 PG (ref 26.8–34.3)
MCHC RBC AUTO-ENTMCNC: 34.7 G/DL (ref 31.4–37.4)
MCV RBC AUTO: 95 FL (ref 82–98)
MONOCYTES # BLD AUTO: 1.05 THOUSAND/ΜL (ref 0.17–1.22)
MONOCYTES NFR BLD AUTO: 13 % (ref 4–12)
NEUTROPHILS # BLD AUTO: 5.62 THOUSANDS/ΜL (ref 1.85–7.62)
NEUTS SEG NFR BLD AUTO: 70 % (ref 43–75)
NRBC BLD AUTO-RTO: 0 /100 WBCS
PLATELET # BLD AUTO: 275 THOUSANDS/UL (ref 149–390)
PMV BLD AUTO: 11 FL (ref 8.9–12.7)
POTASSIUM SERPL-SCNC: 3.8 MMOL/L (ref 3.5–5.3)
PROT SERPL-MCNC: 8.4 G/DL (ref 6.4–8.2)
RBC # BLD AUTO: 5 MILLION/UL (ref 3.88–5.62)
SODIUM SERPL-SCNC: 138 MMOL/L (ref 136–145)
TRIGL SERPL-MCNC: 254 MG/DL
TSH SERPL DL<=0.05 MIU/L-ACNC: 2.3 UIU/ML (ref 0.36–3.74)
WBC # BLD AUTO: 8.01 THOUSAND/UL (ref 4.31–10.16)

## 2020-10-14 PROCEDURE — 36415 COLL VENOUS BLD VENIPUNCTURE: CPT

## 2020-10-14 PROCEDURE — 85025 COMPLETE CBC W/AUTO DIFF WBC: CPT

## 2020-10-14 PROCEDURE — 84443 ASSAY THYROID STIM HORMONE: CPT

## 2020-10-14 PROCEDURE — 80053 COMPREHEN METABOLIC PANEL: CPT

## 2020-10-14 PROCEDURE — 80061 LIPID PANEL: CPT

## 2020-10-16 ENCOUNTER — LAB (OUTPATIENT)
Dept: LAB | Facility: HOSPITAL | Age: 50
End: 2020-10-16
Payer: COMMERCIAL

## 2020-10-16 DIAGNOSIS — R53.83 FATIGUE, UNSPECIFIED TYPE: ICD-10-CM

## 2020-10-16 DIAGNOSIS — R73.01 IMPAIRED FASTING GLUCOSE: ICD-10-CM

## 2020-10-16 DIAGNOSIS — Z13.39 ALCOHOL SCREENING: ICD-10-CM

## 2020-10-16 LAB — GLUCOSE P FAST SERPL-MCNC: 135 MG/DL (ref 65–99)

## 2020-10-16 PROCEDURE — 82947 ASSAY GLUCOSE BLOOD QUANT: CPT

## 2020-10-16 PROCEDURE — 36415 COLL VENOUS BLD VENIPUNCTURE: CPT

## 2020-10-19 ENCOUNTER — OFFICE VISIT (OUTPATIENT)
Dept: FAMILY MEDICINE CLINIC | Facility: CLINIC | Age: 50
End: 2020-10-19
Payer: COMMERCIAL

## 2020-10-19 VITALS
HEIGHT: 70 IN | TEMPERATURE: 97.7 F | BODY MASS INDEX: 29.08 KG/M2 | SYSTOLIC BLOOD PRESSURE: 100 MMHG | WEIGHT: 203.13 LBS | OXYGEN SATURATION: 96 % | DIASTOLIC BLOOD PRESSURE: 72 MMHG | HEART RATE: 70 BPM

## 2020-10-19 DIAGNOSIS — R79.89 ABNORMAL LFTS: ICD-10-CM

## 2020-10-19 DIAGNOSIS — F32.0 CURRENT MILD EPISODE OF MAJOR DEPRESSIVE DISORDER, UNSPECIFIED WHETHER RECURRENT (HCC): ICD-10-CM

## 2020-10-19 DIAGNOSIS — E66.3 OVERWEIGHT: ICD-10-CM

## 2020-10-19 DIAGNOSIS — I10 ESSENTIAL HYPERTENSION: Primary | ICD-10-CM

## 2020-10-19 DIAGNOSIS — K22.70 BARRETT'S ESOPHAGUS WITHOUT DYSPLASIA: ICD-10-CM

## 2020-10-19 DIAGNOSIS — E78.00 HYPERCHOLESTEROLEMIA: ICD-10-CM

## 2020-10-19 DIAGNOSIS — R73.01 IMPAIRED FASTING GLUCOSE: ICD-10-CM

## 2020-10-19 PROCEDURE — 99214 OFFICE O/P EST MOD 30 MIN: CPT | Performed by: PHYSICIAN ASSISTANT

## 2020-10-19 RX ORDER — VENLAFAXINE HYDROCHLORIDE 75 MG/1
75 CAPSULE, EXTENDED RELEASE ORAL
Qty: 90 CAPSULE | Refills: 1 | Status: SHIPPED | OUTPATIENT
Start: 2020-10-19 | End: 2020-12-16

## 2020-12-03 ENCOUNTER — CONSULT (OUTPATIENT)
Dept: ENDOCRINOLOGY | Facility: CLINIC | Age: 50
End: 2020-12-03

## 2020-12-03 VITALS
DIASTOLIC BLOOD PRESSURE: 90 MMHG | HEART RATE: 98 BPM | SYSTOLIC BLOOD PRESSURE: 120 MMHG | HEIGHT: 70 IN | BODY MASS INDEX: 30.21 KG/M2 | WEIGHT: 211 LBS

## 2020-12-03 DIAGNOSIS — R73.01 IMPAIRED FASTING GLUCOSE: ICD-10-CM

## 2020-12-03 PROCEDURE — 3074F SYST BP LT 130 MM HG: CPT | Performed by: INTERNAL MEDICINE

## 2020-12-03 PROCEDURE — 3080F DIAST BP >= 90 MM HG: CPT | Performed by: INTERNAL MEDICINE

## 2020-12-03 PROCEDURE — 99243 OFF/OP CNSLTJ NEW/EST LOW 30: CPT | Performed by: INTERNAL MEDICINE

## 2020-12-03 RX ORDER — LANCETS 28 GAUGE
EACH MISCELLANEOUS
Qty: 100 EACH | Refills: 1 | Status: SHIPPED | OUTPATIENT
Start: 2020-12-03 | End: 2021-02-01

## 2020-12-03 RX ORDER — BLOOD-GLUCOSE METER
KIT MISCELLANEOUS
Qty: 50 EACH | Refills: 2 | Status: SHIPPED | OUTPATIENT
Start: 2020-12-03 | End: 2021-01-11

## 2020-12-03 RX ORDER — BLOOD-GLUCOSE METER
KIT MISCELLANEOUS
Qty: 1 EACH | Refills: 1 | Status: SHIPPED | OUTPATIENT
Start: 2020-12-03

## 2020-12-07 DIAGNOSIS — I10 ESSENTIAL HYPERTENSION: ICD-10-CM

## 2020-12-08 RX ORDER — AMLODIPINE BESYLATE 10 MG/1
TABLET ORAL
Qty: 30 TABLET | Refills: 5 | Status: SHIPPED | OUTPATIENT
Start: 2020-12-08 | End: 2021-10-14

## 2020-12-16 ENCOUNTER — OFFICE VISIT (OUTPATIENT)
Dept: FAMILY MEDICINE CLINIC | Facility: CLINIC | Age: 50
End: 2020-12-16
Payer: COMMERCIAL

## 2020-12-16 VITALS
TEMPERATURE: 97.5 F | OXYGEN SATURATION: 96 % | HEIGHT: 70 IN | DIASTOLIC BLOOD PRESSURE: 88 MMHG | SYSTOLIC BLOOD PRESSURE: 120 MMHG | HEART RATE: 73 BPM | BODY MASS INDEX: 29.69 KG/M2 | WEIGHT: 207.4 LBS

## 2020-12-16 DIAGNOSIS — F32.0 CURRENT MILD EPISODE OF MAJOR DEPRESSIVE DISORDER, UNSPECIFIED WHETHER RECURRENT (HCC): ICD-10-CM

## 2020-12-16 DIAGNOSIS — R73.01 IMPAIRED FASTING GLUCOSE: ICD-10-CM

## 2020-12-16 DIAGNOSIS — Z23 NEED FOR PNEUMOCOCCAL VACCINE: ICD-10-CM

## 2020-12-16 DIAGNOSIS — I10 ESSENTIAL HYPERTENSION: Primary | ICD-10-CM

## 2020-12-16 PROCEDURE — 90471 IMMUNIZATION ADMIN: CPT

## 2020-12-16 PROCEDURE — 90732 PPSV23 VACC 2 YRS+ SUBQ/IM: CPT

## 2020-12-16 PROCEDURE — 3725F SCREEN DEPRESSION PERFORMED: CPT | Performed by: PHYSICIAN ASSISTANT

## 2020-12-16 PROCEDURE — 99214 OFFICE O/P EST MOD 30 MIN: CPT | Performed by: PHYSICIAN ASSISTANT

## 2020-12-16 PROCEDURE — 1036F TOBACCO NON-USER: CPT | Performed by: PHYSICIAN ASSISTANT

## 2020-12-16 PROCEDURE — 3008F BODY MASS INDEX DOCD: CPT | Performed by: PHYSICIAN ASSISTANT

## 2020-12-16 RX ORDER — BENAZEPRIL HYDROCHLORIDE 40 MG/1
40 TABLET, FILM COATED ORAL 2 TIMES DAILY
Qty: 180 TABLET | Refills: 1 | Status: SHIPPED | OUTPATIENT
Start: 2020-12-16 | End: 2021-08-18

## 2021-01-09 DIAGNOSIS — R73.01 IMPAIRED FASTING GLUCOSE: ICD-10-CM

## 2021-01-11 RX ORDER — BLOOD-GLUCOSE METER
KIT MISCELLANEOUS
Qty: 100 EACH | Refills: 1 | Status: SHIPPED | OUTPATIENT
Start: 2021-01-11

## 2021-01-29 DIAGNOSIS — E78.00 HYPERCHOLESTEROLEMIA: ICD-10-CM

## 2021-01-29 RX ORDER — ROSUVASTATIN CALCIUM 40 MG/1
TABLET, COATED ORAL
Qty: 30 TABLET | Refills: 5 | Status: SHIPPED | OUTPATIENT
Start: 2021-01-29 | End: 2021-07-19

## 2021-02-01 DIAGNOSIS — R73.01 IMPAIRED FASTING GLUCOSE: ICD-10-CM

## 2021-02-01 RX ORDER — LANCETS 28 GAUGE
EACH MISCELLANEOUS
Qty: 100 EACH | Refills: 0 | Status: SHIPPED | OUTPATIENT
Start: 2021-02-01

## 2021-03-10 DIAGNOSIS — Z23 ENCOUNTER FOR IMMUNIZATION: ICD-10-CM

## 2021-03-17 ENCOUNTER — OFFICE VISIT (OUTPATIENT)
Dept: FAMILY MEDICINE CLINIC | Facility: CLINIC | Age: 51
End: 2021-03-17
Payer: COMMERCIAL

## 2021-03-17 VITALS
WEIGHT: 204.3 LBS | HEART RATE: 81 BPM | HEIGHT: 70 IN | DIASTOLIC BLOOD PRESSURE: 70 MMHG | OXYGEN SATURATION: 98 % | BODY MASS INDEX: 29.25 KG/M2 | TEMPERATURE: 97.3 F | SYSTOLIC BLOOD PRESSURE: 100 MMHG

## 2021-03-17 DIAGNOSIS — F32.0 CURRENT MILD EPISODE OF MAJOR DEPRESSIVE DISORDER, UNSPECIFIED WHETHER RECURRENT (HCC): ICD-10-CM

## 2021-03-17 DIAGNOSIS — E78.00 HYPERCHOLESTEROLEMIA: ICD-10-CM

## 2021-03-17 DIAGNOSIS — R73.01 IMPAIRED FASTING GLUCOSE: ICD-10-CM

## 2021-03-17 DIAGNOSIS — D86.9 SARCOIDOSIS: ICD-10-CM

## 2021-03-17 DIAGNOSIS — G47.30 SLEEP APNEA, UNSPECIFIED TYPE: ICD-10-CM

## 2021-03-17 DIAGNOSIS — K22.70 BARRETT'S ESOPHAGUS WITHOUT DYSPLASIA: Primary | ICD-10-CM

## 2021-03-17 DIAGNOSIS — J45.20 MILD INTERMITTENT ASTHMA WITHOUT COMPLICATION: ICD-10-CM

## 2021-03-17 DIAGNOSIS — I10 HYPERTENSION, UNSPECIFIED TYPE: ICD-10-CM

## 2021-03-17 DIAGNOSIS — E66.3 OVERWEIGHT: ICD-10-CM

## 2021-03-17 DIAGNOSIS — I10 ESSENTIAL HYPERTENSION: ICD-10-CM

## 2021-03-17 PROCEDURE — 99214 OFFICE O/P EST MOD 30 MIN: CPT | Performed by: PHYSICIAN ASSISTANT

## 2021-03-17 PROCEDURE — 3725F SCREEN DEPRESSION PERFORMED: CPT | Performed by: PHYSICIAN ASSISTANT

## 2021-03-17 PROCEDURE — 1036F TOBACCO NON-USER: CPT | Performed by: PHYSICIAN ASSISTANT

## 2021-03-17 PROCEDURE — 3078F DIAST BP <80 MM HG: CPT | Performed by: PHYSICIAN ASSISTANT

## 2021-03-17 PROCEDURE — 3074F SYST BP LT 130 MM HG: CPT | Performed by: PHYSICIAN ASSISTANT

## 2021-03-17 PROCEDURE — 3008F BODY MASS INDEX DOCD: CPT | Performed by: PHYSICIAN ASSISTANT

## 2021-03-17 RX ORDER — ATENOLOL 100 MG/1
100 TABLET ORAL DAILY
Qty: 90 TABLET | Refills: 1 | Status: SHIPPED | OUTPATIENT
Start: 2021-03-17 | End: 2021-06-25

## 2021-03-17 RX ORDER — HYDROCHLOROTHIAZIDE 25 MG/1
25 TABLET ORAL DAILY
Qty: 90 TABLET | Refills: 1 | Status: SHIPPED | OUTPATIENT
Start: 2021-03-17 | End: 2021-10-28 | Stop reason: SDUPTHER

## 2021-03-17 NOTE — ASSESSMENT & PLAN NOTE
Patient will start exercising regularly by returning to his gym that he and his mother have been vaccinated  He was also encouraged to work on improving his diet  Will continue to monitor and re-evaluate at next visit  BMI Counseling: Body mass index is 29 23 kg/m²  The BMI is above normal  Nutrition recommendations include decreasing overall calorie intake  Exercise recommendations include moderate aerobic physical activity for 150 minutes/week and exercising 3-5 times per week

## 2021-03-17 NOTE — ASSESSMENT & PLAN NOTE
Controlled  He will continue with his current regimen of hydrochlorothiazide 25 mg daily, benazepril 40 mg twice daily, atenolol 100 mg daily, and amlodipine 10 mg daily  Will continue to monitor

## 2021-03-17 NOTE — ASSESSMENT & PLAN NOTE
Patient reports that this has been improved  He will continue with his therapist, Laura Martinez, whom he has found to be very helpful

## 2021-03-17 NOTE — ASSESSMENT & PLAN NOTE
Patient reports that he uses albuterol inhaler a few times this past week but prior to that not really been using it  He will monitor over the next few weeks and does already have an appointment pulmonology to discuss his sleep apnea  He can review asthma control that at time if worsening or with me

## 2021-03-17 NOTE — ASSESSMENT & PLAN NOTE
Well controlled with regards to symptoms as long as he remembers to take his Nexium  He will continue with Nexium 40 mg twice daily  Continue with GI as directed

## 2021-03-17 NOTE — PROGRESS NOTES
FAMILY PRACTICE OFFICE VISIT  [facility] - [facility]       NAME: Moreno Trent  AGE: 54 y o  SEX: male       : 1970        MRN: [id number]    DATE: 3/17/2021  TIME: 10:18 AM    Assessment and Plan     Problem List Items Addressed This Visit        Digestive    Muro's esophagus - Primary       Well controlled with regards to symptoms as long as he remembers to take his Nexium  He will continue with Nexium 40 mg twice daily  Continue with GI as directed  Endocrine    Impaired fasting glucose      Last A1c in 2020 was 5 0%  Will continue to monitor  Will plan on ordering labs at next visit  Respiratory    Asthma, mild intermittent       Patient reports that he uses albuterol inhaler a few times this past week but prior to that not really been using it  He will monitor over the next few weeks and does already have an appointment pulmonology to discuss his sleep apnea  He can review asthma control that at time if worsening or with me  Sleep apnea       Patient lost his CPAP in the process of  from his wife  He reports having an appointment with pulmonology to follow up on this  Cardiovascular and Mediastinum    Hypertension       Controlled  He will continue with his current regimen of hydrochlorothiazide 25 mg daily, benazepril 40 mg twice daily, atenolol 100 mg daily, and amlodipine 10 mg daily  Will continue to monitor  Relevant Medications    hydrochlorothiazide (HYDRODIURIL) 25 mg tablet    atenolol (TENORMIN) 100 mg tablet       Other    Hypercholesterolemia       Controlled with an LDL of 85  He will continue on Crestor 40 mg daily  Will continue to monitor and plan on ordering labs at next visit  Sarcoidosis       He has not recently followed up on this  He will be following up pulmonology next month           Overweight      Patient will start exercising regularly by returning to his gym that he and his mother have been vaccinated  He was also encouraged to work on improving his diet  Will continue to monitor and re-evaluate at next visit  BMI Counseling: Body mass index is 29 23 kg/m²  The BMI is above normal  Nutrition recommendations include decreasing overall calorie intake  Exercise recommendations include moderate aerobic physical activity for 150 minutes/week and exercising 3-5 times per week  Current mild episode of major depressive disorder [facility])       Patient reports that this has been improved  He will continue with his therapist, Kate Osman, whom he has found to be very helpful  Muro's esophagus    Well controlled with regards to symptoms as long as he remembers to take his Nexium  He will continue with Nexium 40 mg twice daily  Continue with GI as directed  Impaired fasting glucose   Last A1c in June 2020 was 5 0%  Will continue to monitor  Will plan on ordering labs at next visit  Asthma, mild intermittent    Patient reports that he uses albuterol inhaler a few times this past week but prior to that not really been using it  He will monitor over the next few weeks and does already have an appointment pulmonology to discuss his sleep apnea  He can review asthma control that at time if worsening or with me  Sleep apnea    Patient lost his CPAP in the process of  from his wife  He reports having an appointment with pulmonology to follow up on this  Hypertension    Controlled  He will continue with his current regimen of hydrochlorothiazide 25 mg daily, benazepril 40 mg twice daily, atenolol 100 mg daily, and amlodipine 10 mg daily  Will continue to monitor  Current mild episode of major depressive disorder [facility])    Patient reports that this has been improved  He will continue with his therapist, Kate Osman, whom he has found to be very helpful      Hypercholesterolemia    Controlled with an LDL of 85  He will continue on Crestor 40 mg daily  Will continue to monitor and plan on ordering labs at next visit  Overweight   Patient will start exercising regularly by returning to his gym that he and his mother have been vaccinated  He was also encouraged to work on improving his diet  Will continue to monitor and re-evaluate at next visit  BMI Counseling: Body mass index is 29 23 kg/m²  The BMI is above normal  Nutrition recommendations include decreasing overall calorie intake  Exercise recommendations include moderate aerobic physical activity for 150 minutes/week and exercising 3-5 times per week  Sarcoidosis    He has not recently followed up on this  He will be following up pulmonology next month  Chief Complaint     Chief Complaint   Patient presents with    Follow-up     3 month f/up        History of Present Illness   Marlon Stiles is a 54y o -year-old male who presents for 3 month follow-up on chronic problems  The patient reports that current blood pressure medications include amlodipine 10 mg daily, atenolol 100 mg daily, benazepril 40 mg twice daily, and HCTZ 25 mg daily  Blood pressure readings at home are approximately 118-130/80s- low 90s  The patient denies symptoms of poor control such as chest pain, shortness of breath, leg swelling, vision changes, headaches, or dizziness  The patient reports limited unsweetened iced tea for caffeine intake and unlimited salt intake  The patient reports that recently anxiety/depression level has been improved  He is not taking any meds for this  Care team includes a therapist individually and with daughter  The patient denies SI/HI  Today's PHQ9 score was 6  ETOH intake has been a few days a week - about 1-4 drinks 3 times a week  He did not follow-up with nutrition regarding his impaired fasting glucose but plans to call today  The patient continues with Crestor 40 milligrams daily    Last LDL was 85 in 10/2020  The patient denies myalgias  The patient reports that GERD/Muro's esophagus has been well-controlled with Nexium 40 mg twice daily - notes symptoms when he forgets 1-2 doses  The patient reports that asthma control has been acting up a little this week  He is not on any daily controller medication  Albuterol is required about a few times this past week  The patient denies recent shortness of breath, coughing, but had wheezing upon waking  Since the patent's last visit, weight has decreased about 3 pounds  Diet is reported to been difficult to keep on track with working from home at [employer] where she frequently wants to feed him  Exercise is not regular but he plans to restart now that both he and his mom have gotten their COVID vaccines  He notes that he has been wanting to get a new CPAP  He lost his with the separation from his wife  He has an appointment set up with Pulmonology to follow-up on this  Review of Systems   Review of Systems   Constitutional: Negative for chills and fever  Respiratory: Negative for shortness of breath  Cardiovascular: Negative for chest pain, palpitations and leg swelling  Neurological: Negative for dizziness and headaches         Active Problem List     Patient Active Problem List   Diagnosis    Allergic rhinitis    Muro's esophagus    Asthma, mild intermittent    Hematuria    Hiatal hernia    Hypercholesterolemia    Hypertension    Impaired fasting glucose    Sarcoidosis    Sleep apnea    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    Anxiety    Abnormal LFTs    Arm paresthesia, left    Alcohol screening    Overweight    Fatigue    Current mild episode of major depressive disorder (HCC)         Past Medical History:  Past Medical History:   Diagnosis Date    Chest pain     Sarcoidosis        Past Surgical History:  Past Surgical History:   Procedure Laterality Date    BRONCHOSCOPY      COLONOSCOPY      complete     CYSTOSCOPY      Diagnostic - Neg     ESOPHAGOGASTRODUODENOSCOPY      Diagnostic        Family History:  Family History   Problem Relation Age of Onset    Colon cancer Mother         age in 29s    Hyperlipidemia Mother     Hyperlipidemia Father     Pancreatic cancer Father         Adenocarcinoma of the ampulla of vater     Colon cancer Maternal Uncle     Colon cancer Other         maternal relatives - likely MGF and a second cousin in her 35s       Social History:  Social History     Socioeconomic History    Marital status: /Civil Union     Spouse name: Not on file    Number of children: Not on file    Years of education: Not on file    Highest education level: Not on file   Occupational History    Occupation:     Social Needs    Financial resource strain: Not on file    Food insecurity     Worry: Not on file     Inability: Not on file   [employer] needs     Medical: Not on file     Non-medical: Not on file   Tobacco Use    Smoking status: Former Smoker     Types: Cigarettes     Quit date:      Years since quittin 2    Smokeless tobacco: Former User    Tobacco comment: smoked about 1 pack every 2 weeks for 2 years in his 19's    Substance and Sexual Activity    Alcohol use: Yes     Frequency: 2-3 times a week     Drinks per session: 3 or 4     Comment: reports down to 5 drinks once weekly    Drug use: No    Sexual activity: Not on file   Lifestyle    Physical activity     Days per week: Not on file     Minutes per session: Not on file    Stress: Not on file   Relationships    Social connections     Talks on phone: Not on file     Gets together: Not on file     Attends Denominational service: Not on file     Active member of club or organization: Not on file     Attends meetings of clubs or organizations: Not on file     Relationship status: Not on file    Intimate partner violence     Fear of current or ex partner: Not on file     Emotionally abused: Not on file     Physically abused: Not on file     Forced sexual activity: Not on file   Other Topics Concern    Not on file   Social History Narrative    Lives home with wife and daughter        Objective     Vitals:    03/17/21 0810 03/17/21 0844   BP: 120/88 100/70   BP Location: Left arm    Patient Position: Sitting    Cuff Size: Adult    Pulse: 81    Temp: (!) 97 3 °F (36 3 °C)    TempSrc: Temporal    SpO2: 98%    Weight: 92 7 kg (204 lb 4 8 oz)    Height: 5' 10 1" (1 781 m)      Wt Readings from Last 3 Encounters:   03/17/21 92 7 kg (204 lb 4 8 oz)   12/16/20 94 1 kg (207 lb 6 4 oz)   12/03/20 95 7 kg (211 lb)       Physical Exam  Vitals signs reviewed  Constitutional:       General: He is not in acute distress  Appearance: Normal appearance  He is well-developed  He is not ill-appearing  HENT:      Head: Normocephalic and atraumatic  Neck:      Musculoskeletal: Neck supple  Thyroid: No thyromegaly  Vascular: No carotid bruit  Cardiovascular:      Rate and Rhythm: Normal rate and regular rhythm  Pulses: Normal pulses  Heart sounds: Normal heart sounds  No murmur  Pulmonary:      Effort: Pulmonary effort is normal       Breath sounds: Normal breath sounds  No wheezing, rhonchi or rales  Musculoskeletal:      Right lower leg: No edema  Left lower leg: No edema  Lymphadenopathy:      Cervical: No cervical adenopathy  Skin:     General: Skin is warm and dry  Neurological:      Mental Status: He is alert     Psychiatric:         Mood and Affect: Mood normal          Behavior: Behavior normal          Pertinent Laboratory/Diagnostic Studies:  Lab Results   Component Value Date    GLUCOSE 99 07/25/2014    BUN 17 10/14/2020    CREATININE 1 00 10/14/2020    CALCIUM 10 5 (H) 10/14/2020     07/25/2014    K 3 8 10/14/2020    CO2 29 10/14/2020     10/14/2020     Lab Results   Component Value Date     (H) 10/14/2020    AST 112 (H) 10/14/2020    ALKPHOS 58 10/14/2020    BILITOT 0 66 07/25/2014       Lab Results   Component Value Date    WBC 8 01 10/14/2020    HGB 16 5 10/14/2020    HCT 47 5 10/14/2020    MCV 95 10/14/2020     10/14/2020     Lab Results   Component Value Date    CHOL 239 07/25/2014     Lab Results   Component Value Date    TRIG 254 (H) 10/14/2020     Lab Results   Component Value Date    HDL 64 10/14/2020     Lab Results   Component Value Date    LDLCALC 85 10/14/2020     Lab Results   Component Value Date    HGBA1C 5 0 06/26/2020       Results for orders placed or performed in visit on 10/16/20   GTT Fasting 75 GM GLU challenge non-preg(Panel Component-Do Not Order)   Result Value Ref Range    Glucose, GTT - Fasting 135 (H) 65 - 99 mg/dL         ALLERGIES:  Allergies   Allergen Reactions    Other      seasonal       Current Medications     Current Outpatient Medications   Medication Sig Dispense Refill    albuterol (PROAIR HFA) 90 mcg/act inhaler Inhale 2 puffs every 6 (six) hours as needed      amLODIPine (NORVASC) 10 mg tablet TAKE 1 TABLET BY MOUTH EVERY DAY 30 tablet 5    atenolol (TENORMIN) 100 mg tablet Take 1 tablet (100 mg total) by mouth daily 90 tablet 1    benazepril (LOTENSIN) 40 MG tablet Take 1 tablet (40 mg total) by mouth 2 (two) times a day 180 tablet 1    Blood Glucose Monitoring Suppl (FreeStyle Freedom Lite) w/Device KIT Use to test 2x daily, 2hours after a meal 1 each 1    esomeprazole (NexIUM) 40 MG capsule Take 40 mg by mouth 2 (two) times a day    0    fluticasone (FLONASE) 50 mcg/act nasal spray 1 spray into each nostril daily as needed for rhinitis      glucose blood (FREESTYLE LITE) test strip USE AS INSTRUCTED WITH GLUCOMETER TO TEST BLOOD GLUCOSE 100 each 1    hydrochlorothiazide (HYDRODIURIL) 25 mg tablet Take 1 tablet (25 mg total) by mouth daily 90 tablet 1    Lancets (freestyle) lancets USE TO TEST BLOOD SUGARSS 2X DAILY 2 HOURS AFTER MEALS 100 each 0    Milk Thistle 250 MG CAPS Take 1 caplet by mouth daily      rosuvastatin (CRESTOR) 40 MG tablet TAKE 1 TABLET BY MOUTH EVERY DAY 30 tablet 5     No current facility-administered medications for this visit            Health Maintenance     Health Maintenance   Topic Date Due    Annual Physical  07/17/2021    BMI: Followup Plan  10/19/2021    Colonoscopy Surveillance  12/11/2021    BMI: Adult  03/17/2022    Depression Remission PHQ  03/17/2022    DTaP,Tdap,and Td Vaccines (3 - Td) 01/11/2027    HIV Screening  Completed    Hepatitis C Screening  Completed    Pneumococcal Vaccine: Pediatrics (0 to 5 Years) and At-Risk Patients (6 to 59 Years)  Completed    Influenza Vaccine  Completed    COVID-19 Vaccine  Completed    HIB Vaccine  Aged Out    Hepatitis B Vaccine  Aged Out    IPV Vaccine  Aged Out    Hepatitis A Vaccine  Aged Out    Meningococcal ACWY Vaccine  Aged Out    HPV Vaccine  Aged Out     Immunization History   Administered Date(s) Administered    INFLUENZA 10/12/2018    Influenza Injectable, MDCK, Preservative Free, Quadrivalent, 0 5 mL 10/10/2020    Influenza Quadrivalent Preservative Free 3 years and older IM 10/01/2017    Influenza, recombinant, quadrivalent,injectable, preservative free 09/06/2019    Influenza, seasonal, injectable 01/02/2007, 11/08/2015    Pneumococcal Polysaccharide PPV23 12/16/2020    SARS-CoV-2 / COVID-19 mRNA IM (Donavon Gaucher) 02/04/2021, 03/04/2021    Tdap 11/30/2015, 01/11/2017       Yvonne Lyons PA-C  3/17/2021 10:18 AM  [facility]

## 2021-03-17 NOTE — ASSESSMENT & PLAN NOTE
Patient lost his CPAP in the process of  from his wife  He reports having an appointment with pulmonology to follow up on this

## 2021-03-17 NOTE — ASSESSMENT & PLAN NOTE
Controlled with an LDL of 85  He will continue on Crestor 40 mg daily  Will continue to monitor and plan on ordering labs at next visit

## 2021-03-26 ENCOUNTER — CLINICAL SUPPORT (OUTPATIENT)
Dept: NUTRITION | Facility: HOSPITAL | Age: 51
End: 2021-03-26
Payer: COMMERCIAL

## 2021-03-26 VITALS — BODY MASS INDEX: 29.87 KG/M2 | WEIGHT: 208.8 LBS

## 2021-03-26 DIAGNOSIS — R73.01 IMPAIRED FASTING GLUCOSE: Primary | ICD-10-CM

## 2021-03-26 PROCEDURE — 97802 MEDICAL NUTRITION INDIV IN: CPT

## 2021-03-26 NOTE — PROGRESS NOTES
Initial Nutrition Assessment Form    Patient Name: Milan Mondragon    YOB: 1970    Sex: Male     Assessment Date: 3/26/2021  Start Time: 1110 Stop Time: 1210 Total Minutes: 61     Data:  Present at session: self   Parent/Patient Concerns: "I've gained 20# over the last year"   Medical Dx/Reason for Referral: IFG   Past Medical History:   Diagnosis Date    Chest pain     Sarcoidosis        Current Outpatient Medications   Medication Sig Dispense Refill    albuterol (PROAIR HFA) 90 mcg/act inhaler Inhale 2 puffs every 6 (six) hours as needed      amLODIPine (NORVASC) 10 mg tablet TAKE 1 TABLET BY MOUTH EVERY DAY 30 tablet 5    atenolol (TENORMIN) 100 mg tablet Take 1 tablet (100 mg total) by mouth daily 90 tablet 1    benazepril (LOTENSIN) 40 MG tablet Take 1 tablet (40 mg total) by mouth 2 (two) times a day 180 tablet 1    Blood Glucose Monitoring Suppl (FreeStyle Freedom Lite) w/Device KIT Use to test 2x daily, 2hours after a meal 1 each 1    esomeprazole (NexIUM) 40 MG capsule Take 40 mg by mouth 2 (two) times a day    0    fluticasone (FLONASE) 50 mcg/act nasal spray 1 spray into each nostril daily as needed for rhinitis      glucose blood (FREESTYLE LITE) test strip USE AS INSTRUCTED WITH GLUCOMETER TO TEST BLOOD GLUCOSE 100 each 1    hydrochlorothiazide (HYDRODIURIL) 25 mg tablet Take 1 tablet (25 mg total) by mouth daily 90 tablet 1    Lancets (freestyle) lancets USE TO TEST BLOOD SUGARSS 2X DAILY 2 HOURS AFTER MEALS 100 each 0    Milk Thistle 250 MG CAPS Take 1 caplet by mouth daily      rosuvastatin (CRESTOR) 40 MG tablet TAKE 1 TABLET BY MOUTH EVERY DAY 30 tablet 5     No current facility-administered medications for this visit           Additional Meds/Supplements: n/a   Special Learning Needs: n/a   Height: HC Readings from Last 3 Encounters:   No data found for [facility], [facility]       Weight: Wt Readings from Last 10 Encounters:   03/26/21 94 7 kg (208 lb 12 8 oz)   03/17/21 92 7 kg (204 lb 4 8 oz)   20 94 1 kg (207 lb 6 4 oz)   20 95 7 kg (211 lb)   10/19/20 92 1 kg (203 lb 2 oz)   20 91 2 kg (201 lb)   20 90 kg (198 lb 6 oz)   19 92 5 kg (204 lb)   19 89 1 kg (196 lb 8 oz)   19 85 7 kg (189 lb)     Estimated body mass index is 29 87 kg/m² as calculated from the following:    Height as of 3/17/21: 5' 10 1" (1 781 m)  Weight as of this encounter: 94 7 kg (208 lb 12 8 oz)  Recent Weight Change: [x]Yes     []No  Amount: 20# gain last year      Energy Needs: Allergies   Allergen Reactions    Other      seasonal    NKFA   Social History     Substance and Sexual Activity   Alcohol Use Yes    Frequency: 2-3 times a week    Drinks per session: 3 or 4    Comment: reports down to 5 drinks once weekly    3x/wk 3-4drinks per session  Mixed drinks   Social History     Tobacco Use   Smoking Status Former Smoker    Types: Cigarettes    Quit date:     Years since quittin 2   Smokeless Tobacco Former User   Tobacco Comment    smoked about 1 pack every 2 weeks for 2 years in his 19's     n/a   Who shops? mother   Who cooks? mother   Exercise: Walk 3-4x/wk  X 2 miles x 1hr   Prior Counseling? []Yes     [x]No  When:      Why:         Diet Hx: still following paleo diet to some degree; lost 25# in  and exercised regularly water the rest of the day unsweetened iced tea maybe at lunch not daily though  Breakfast: when commuting coffee and fruit  w/e only 10-11am eggs with toast [id number] butter OJ A4SC  Saturday and       a m  Does not really eat B   Lunch: 11ish left overs- meat starch veggie pasta with meat fish with vegetables; or s/w- Skyler hoagie x 6inch same at home cheese mayonnaise italian dressing water    11am and 1  p m    Will be pushed later ornot at all ont he w/e         Dinner:  Pork chop mashed potato and green beans (1/2 of everything, ate out) 2x/wk either eat out or take out; meat with rice/orzo x1c steak and baked potato salads for most dinners- oil and vinegar greens tomato cucumbers sometimes feta x1c vegetables ; really hungry 1-2x will eat 12 oz   5-6   p m  Snacks:  Mixed nuts or trail mix blends dried fruit small chocolate x 4oz sometimes rest of s/w (2x/wk) AM - n/a  PM - n/a  HS - 9-10pm        Nutrition Diagnosis:   Altered Nutrition-Related Laboratory values  related to Kidney, liver, cardiac, endocrine, neurologic, and/or pulmonary dysfunction as evidenced by  Abnormal plasma glucose and/or HgbA1c levels       Medical Nutrition Therapy Intervention:  []Individualized Meal Plan []Understanding Lab Values   []Basic Pathophysiology of Disease []Food/Medication Interactions   []Food Diary [x]Exercise-30mins moderate daily 5x/wk   [x]Lifestyle/Behavior Modification Techniques- 7-9 hours adequate sleep []Medication, Mechanism of Action   []Label Reading []Self Blood Glucose Monitoring   [x]Weight/BMI Goals- would like to get back down to 180# last there in 2015 maintained for 2-3 years [x]Other - working from home for the last year has been taking naps after lunch will sleep from 1-3 hours; commuter would commute to [facility]   Other Notes: In a divorce situation right now and living with his mom; bedtime at 10pm asleep within 30 mins sleeping through with dream vs waking up every 2 hours, wakes up at 1,3,5 waking up 730-8 still feels tired; w/e same bedtime but waking up at 9am and feels better not as tired; Comprehension: []Excellent  []Very Good  [x]Good  []Fair   []Poor    Receptivity: []Excellent  []Very Good  [x]Good  []Fair   []Poor    Expected Compliance: []Excellent  []Very Good  [x]Good  []Fair   []Poor        Goals:  1  3 meals a day no skipping   2  Timing of meals B within 1 hr waking L 11-1 and D 5-6pm   3 portions per plate method as discussed with RD       No follow-ups on file    Labs:  CMP  Lab Results   Component Value Date     07/25/2014    K 3 8 10/14/2020     10/14/2020    CO2 29 10/14/2020    ANIONGAP 11 07/25/2014    BUN 17 10/14/2020    CREATININE 1 00 10/14/2020    GLUCOSE 99 07/25/2014    GLUF 135 (H) 10/16/2020    CALCIUM 10 5 (H) 10/14/2020     (H) 10/14/2020     (H) 10/14/2020    ALKPHOS 58 10/14/2020    PROT 8 0 07/25/2014    BILITOT 0 66 07/25/2014    EGFR 89 10/14/2020       BMP  Lab Results   Component Value Date    GLUCOSE 99 07/25/2014    CALCIUM 10 5 (H) 10/14/2020     07/25/2014    K 3 8 10/14/2020    CO2 29 10/14/2020     10/14/2020    BUN 17 10/14/2020    CREATININE 1 00 10/14/2020       Lipids  Lab Results   Component Value Date    CHOL 239 07/25/2014     Lab Results   Component Value Date    HDL 64 10/14/2020    HDL 42 06/26/2020    HDL 36 (L) 12/14/2019     Lab Results   Component Value Date    LDLCALC 85 10/14/2020    LDLCALC 177 (H) 06/26/2020    LDLCALC 174 (H) 12/14/2019     Lab Results   Component Value Date    TRIG 254 (H) 10/14/2020    TRIG 315 (H) 06/26/2020    TRIG 299 (H) 12/14/2019     No results found for: CHOLHDL    Hemoglobin A1C  Lab Results   Component Value Date    HGBA1C 5 0 06/26/2020       Fasting Glucose  Lab Results   Component Value Date    GLUF 135 (H) 10/16/2020       Insulin     Thyroid  No results found for: TSH, Q0MCTXB, Z7IEHXZ, THYROIDAB    Hepatic Function Panel  Lab Results   Component Value Date     (H) 10/14/2020     (H) 10/14/2020    ALKPHOS 58 10/14/2020    BILITOT 0 66 07/25/2014       Celiac Disease Antibody Panel  No results found for: ENDOMYSIAL IGA, GLIADIN IGA, GLIADIN IGG, IGA, TISSUE TRANSGLUT AB, TTG IGA   Iron  Lab Results   Component Value Date    IRON 132 06/26/2020    TIBC 414 06/26/2020       Vitamins  No results found for: VITAMIN B2   No results found for: NICOTINAMIDE, NICOTINIC ACID   No results found for: VITAMINB6  No results found for: UCYOOIOG23  No results found for: VITB5  No results found for: P3DBSPMN  No results found for: THYROGLB  No results found for: VITAMIN K   No results found for: 25-HYDROXY VIT D   No components found for: VITAMINE     Farhanlmon MS RD LDN  [facility] Ashli Alarcon [address] [place] [id number]

## 2021-06-25 DIAGNOSIS — I10 HYPERTENSION, UNSPECIFIED TYPE: ICD-10-CM

## 2021-06-25 RX ORDER — ATENOLOL 100 MG/1
TABLET ORAL
Qty: 30 TABLET | Refills: 5 | Status: SHIPPED | OUTPATIENT
Start: 2021-06-25 | End: 2021-10-14

## 2021-07-19 DIAGNOSIS — E78.00 HYPERCHOLESTEROLEMIA: ICD-10-CM

## 2021-07-19 RX ORDER — ROSUVASTATIN CALCIUM 40 MG/1
TABLET, COATED ORAL
Qty: 30 TABLET | Refills: 5 | Status: SHIPPED | OUTPATIENT
Start: 2021-07-19 | End: 2021-10-28 | Stop reason: SDUPTHER

## 2021-08-18 DIAGNOSIS — I10 ESSENTIAL HYPERTENSION: ICD-10-CM

## 2021-08-18 RX ORDER — BENAZEPRIL HYDROCHLORIDE 40 MG/1
TABLET, FILM COATED ORAL
Qty: 180 TABLET | Refills: 1 | Status: SHIPPED | OUTPATIENT
Start: 2021-08-18 | End: 2021-10-28 | Stop reason: SDUPTHER

## 2021-10-01 ENCOUNTER — TELEPHONE (OUTPATIENT)
Dept: FAMILY MEDICINE CLINIC | Facility: CLINIC | Age: 51
End: 2021-10-01

## 2021-10-01 DIAGNOSIS — D86.9 SARCOIDOSIS: Primary | ICD-10-CM

## 2021-10-13 DIAGNOSIS — I10 HYPERTENSION, UNSPECIFIED TYPE: ICD-10-CM

## 2021-10-13 DIAGNOSIS — I10 ESSENTIAL HYPERTENSION: ICD-10-CM

## 2021-10-14 ENCOUNTER — APPOINTMENT (OUTPATIENT)
Dept: RADIOLOGY | Facility: MEDICAL CENTER | Age: 51
End: 2021-10-14
Payer: COMMERCIAL

## 2021-10-14 DIAGNOSIS — D86.9 SARCOIDOSIS: ICD-10-CM

## 2021-10-14 PROCEDURE — 71046 X-RAY EXAM CHEST 2 VIEWS: CPT

## 2021-10-14 RX ORDER — AMLODIPINE BESYLATE 10 MG/1
TABLET ORAL
Qty: 90 TABLET | Refills: 0 | Status: SHIPPED | OUTPATIENT
Start: 2021-10-14 | End: 2022-01-29

## 2021-10-14 RX ORDER — ATENOLOL 100 MG/1
TABLET ORAL
Qty: 90 TABLET | Refills: 0 | Status: SHIPPED | OUTPATIENT
Start: 2021-10-14 | End: 2022-06-11

## 2021-10-28 ENCOUNTER — OFFICE VISIT (OUTPATIENT)
Dept: FAMILY MEDICINE CLINIC | Facility: CLINIC | Age: 51
End: 2021-10-28
Payer: COMMERCIAL

## 2021-10-28 VITALS
TEMPERATURE: 97.8 F | HEIGHT: 70 IN | HEART RATE: 76 BPM | DIASTOLIC BLOOD PRESSURE: 87 MMHG | OXYGEN SATURATION: 100 % | BODY MASS INDEX: 28.69 KG/M2 | WEIGHT: 200.4 LBS | SYSTOLIC BLOOD PRESSURE: 100 MMHG

## 2021-10-28 DIAGNOSIS — N52.9 ERECTILE DYSFUNCTION, UNSPECIFIED ERECTILE DYSFUNCTION TYPE: ICD-10-CM

## 2021-10-28 DIAGNOSIS — F33.9 DEPRESSION, RECURRENT (HCC): ICD-10-CM

## 2021-10-28 DIAGNOSIS — R73.01 IMPAIRED FASTING GLUCOSE: ICD-10-CM

## 2021-10-28 DIAGNOSIS — E78.00 HYPERCHOLESTEROLEMIA: ICD-10-CM

## 2021-10-28 DIAGNOSIS — Z00.00 PHYSICAL EXAM: Primary | ICD-10-CM

## 2021-10-28 DIAGNOSIS — Z11.59 NEED FOR HEPATITIS C SCREENING TEST: ICD-10-CM

## 2021-10-28 DIAGNOSIS — Z11.4 SCREENING FOR HIV (HUMAN IMMUNODEFICIENCY VIRUS): ICD-10-CM

## 2021-10-28 DIAGNOSIS — Z00.00 ANNUAL PHYSICAL EXAM: ICD-10-CM

## 2021-10-28 DIAGNOSIS — I10 ESSENTIAL HYPERTENSION: ICD-10-CM

## 2021-10-28 DIAGNOSIS — G47.30 SLEEP APNEA, UNSPECIFIED TYPE: ICD-10-CM

## 2021-10-28 DIAGNOSIS — D86.9 SARCOIDOSIS: ICD-10-CM

## 2021-10-28 DIAGNOSIS — R79.89 ABNORMAL LFTS: ICD-10-CM

## 2021-10-28 DIAGNOSIS — J45.20 MILD INTERMITTENT ASTHMA WITHOUT COMPLICATION: ICD-10-CM

## 2021-10-28 PROCEDURE — 99396 PREV VISIT EST AGE 40-64: CPT | Performed by: FAMILY MEDICINE

## 2021-10-28 RX ORDER — HYDROCHLOROTHIAZIDE 25 MG/1
25 TABLET ORAL DAILY
Qty: 90 TABLET | Refills: 2 | Status: SHIPPED | OUTPATIENT
Start: 2021-10-28 | End: 2022-08-09

## 2021-10-28 RX ORDER — BENAZEPRIL HYDROCHLORIDE 40 MG/1
40 TABLET, FILM COATED ORAL 2 TIMES DAILY
Qty: 180 TABLET | Refills: 2 | Status: SHIPPED | OUTPATIENT
Start: 2021-10-28 | End: 2021-10-29 | Stop reason: SDUPTHER

## 2021-10-28 RX ORDER — SILDENAFIL 50 MG/1
50 TABLET, FILM COATED ORAL DAILY PRN
Qty: 10 TABLET | Refills: 0 | Status: SHIPPED | OUTPATIENT
Start: 2021-10-28 | End: 2022-04-28 | Stop reason: SDUPTHER

## 2021-10-28 RX ORDER — ROSUVASTATIN CALCIUM 40 MG/1
40 TABLET, COATED ORAL DAILY
Qty: 90 TABLET | Refills: 2 | Status: SHIPPED | OUTPATIENT
Start: 2021-10-28 | End: 2022-07-26

## 2021-10-29 RX ORDER — BENAZEPRIL HYDROCHLORIDE 40 MG/1
TABLET, FILM COATED ORAL
Qty: 90 TABLET | Refills: 0 | Status: SHIPPED | OUTPATIENT
Start: 2021-10-29 | End: 2022-05-05

## 2021-12-02 ENCOUNTER — OFFICE VISIT (OUTPATIENT)
Dept: FAMILY MEDICINE CLINIC | Facility: CLINIC | Age: 51
End: 2021-12-02
Payer: COMMERCIAL

## 2021-12-02 VITALS
WEIGHT: 200 LBS | HEIGHT: 70 IN | DIASTOLIC BLOOD PRESSURE: 87 MMHG | OXYGEN SATURATION: 98 % | HEART RATE: 86 BPM | SYSTOLIC BLOOD PRESSURE: 100 MMHG | TEMPERATURE: 97.1 F | BODY MASS INDEX: 28.63 KG/M2

## 2021-12-02 DIAGNOSIS — Z23 NEED FOR INFLUENZA VACCINATION: Primary | ICD-10-CM

## 2021-12-02 DIAGNOSIS — I10 PRIMARY HYPERTENSION: ICD-10-CM

## 2021-12-02 PROCEDURE — 3079F DIAST BP 80-89 MM HG: CPT | Performed by: FAMILY MEDICINE

## 2021-12-02 PROCEDURE — 3074F SYST BP LT 130 MM HG: CPT | Performed by: FAMILY MEDICINE

## 2021-12-02 PROCEDURE — 99213 OFFICE O/P EST LOW 20 MIN: CPT | Performed by: FAMILY MEDICINE

## 2021-12-02 PROCEDURE — 1036F TOBACCO NON-USER: CPT | Performed by: FAMILY MEDICINE

## 2021-12-02 PROCEDURE — 3008F BODY MASS INDEX DOCD: CPT | Performed by: FAMILY MEDICINE

## 2021-12-21 ENCOUNTER — LAB (OUTPATIENT)
Dept: LAB | Facility: MEDICAL CENTER | Age: 51
End: 2021-12-21
Payer: COMMERCIAL

## 2021-12-21 DIAGNOSIS — R79.89 ABNORMAL LFTS: ICD-10-CM

## 2021-12-21 DIAGNOSIS — Z11.4 SCREENING FOR HIV (HUMAN IMMUNODEFICIENCY VIRUS): ICD-10-CM

## 2021-12-21 DIAGNOSIS — E78.00 HYPERCHOLESTEROLEMIA: ICD-10-CM

## 2021-12-21 DIAGNOSIS — R73.01 IMPAIRED FASTING GLUCOSE: ICD-10-CM

## 2021-12-21 DIAGNOSIS — Z11.59 NEED FOR HEPATITIS C SCREENING TEST: ICD-10-CM

## 2021-12-21 DIAGNOSIS — I10 ESSENTIAL HYPERTENSION: ICD-10-CM

## 2021-12-21 LAB
ALBUMIN SERPL BCP-MCNC: 4.1 G/DL (ref 3.5–5)
ALP SERPL-CCNC: 81 U/L (ref 46–116)
ALT SERPL W P-5'-P-CCNC: 108 U/L (ref 12–78)
ANION GAP SERPL CALCULATED.3IONS-SCNC: 9 MMOL/L (ref 4–13)
AST SERPL W P-5'-P-CCNC: 99 U/L (ref 5–45)
BILIRUB SERPL-MCNC: 1.49 MG/DL (ref 0.2–1)
BUN SERPL-MCNC: 25 MG/DL (ref 5–25)
CALCIUM SERPL-MCNC: 10.9 MG/DL (ref 8.3–10.1)
CHLORIDE SERPL-SCNC: 96 MMOL/L (ref 100–108)
CHOLEST SERPL-MCNC: 228 MG/DL
CO2 SERPL-SCNC: 28 MMOL/L (ref 21–32)
CREAT SERPL-MCNC: 1.16 MG/DL (ref 0.6–1.3)
GFR SERPL CREATININE-BSD FRML MDRD: 73 ML/MIN/1.73SQ M
GLUCOSE P FAST SERPL-MCNC: 118 MG/DL (ref 65–99)
HCV AB SER QL: NORMAL
HDLC SERPL-MCNC: 47 MG/DL
MAGNESIUM SERPL-MCNC: 2.2 MG/DL (ref 1.6–2.6)
NONHDLC SERPL-MCNC: 181 MG/DL
POTASSIUM SERPL-SCNC: 3.2 MMOL/L (ref 3.5–5.3)
PROT SERPL-MCNC: 8.7 G/DL (ref 6.4–8.2)
PSA SERPL-MCNC: 1.2 NG/ML (ref 0–4)
SODIUM SERPL-SCNC: 133 MMOL/L (ref 136–145)
TRIGL SERPL-MCNC: 520 MG/DL
TSH SERPL DL<=0.05 MIU/L-ACNC: 3.22 UIU/ML (ref 0.36–3.74)

## 2021-12-21 PROCEDURE — G0103 PSA SCREENING: HCPCS

## 2021-12-21 PROCEDURE — 36415 COLL VENOUS BLD VENIPUNCTURE: CPT

## 2021-12-21 PROCEDURE — 80053 COMPREHEN METABOLIC PANEL: CPT

## 2021-12-21 PROCEDURE — 80061 LIPID PANEL: CPT

## 2021-12-21 PROCEDURE — 87389 HIV-1 AG W/HIV-1&-2 AB AG IA: CPT

## 2021-12-21 PROCEDURE — 84402 ASSAY OF FREE TESTOSTERONE: CPT

## 2021-12-21 PROCEDURE — 84403 ASSAY OF TOTAL TESTOSTERONE: CPT

## 2021-12-21 PROCEDURE — 86803 HEPATITIS C AB TEST: CPT

## 2021-12-21 PROCEDURE — 84443 ASSAY THYROID STIM HORMONE: CPT

## 2021-12-21 PROCEDURE — 83735 ASSAY OF MAGNESIUM: CPT

## 2021-12-22 LAB
HIV 1+2 AB+HIV1 P24 AG SERPL QL IA: NORMAL
TESTOST FREE SERPL-MCNC: 22 PG/ML (ref 6.8–21.5)
TESTOST SERPL-MCNC: 435 NG/DL (ref 264–916)

## 2022-01-05 ENCOUNTER — OFFICE VISIT (OUTPATIENT)
Dept: FAMILY MEDICINE CLINIC | Facility: CLINIC | Age: 52
End: 2022-01-05
Payer: COMMERCIAL

## 2022-01-05 VITALS
HEIGHT: 70 IN | OXYGEN SATURATION: 98 % | HEART RATE: 67 BPM | SYSTOLIC BLOOD PRESSURE: 110 MMHG | WEIGHT: 196 LBS | TEMPERATURE: 97.6 F | BODY MASS INDEX: 28.06 KG/M2 | DIASTOLIC BLOOD PRESSURE: 67 MMHG

## 2022-01-05 DIAGNOSIS — R73.9 HYPERGLYCEMIA: ICD-10-CM

## 2022-01-05 DIAGNOSIS — E87.6 HYPOKALEMIA: ICD-10-CM

## 2022-01-05 DIAGNOSIS — E78.1 HYPERTRIGLYCERIDEMIA: ICD-10-CM

## 2022-01-05 DIAGNOSIS — E88.09 HYPERPROTEINEMIA: ICD-10-CM

## 2022-01-05 DIAGNOSIS — R79.89 ABNORMAL LFTS: ICD-10-CM

## 2022-01-05 DIAGNOSIS — E87.1 HYPONATREMIA: ICD-10-CM

## 2022-01-05 DIAGNOSIS — J45.20 MILD INTERMITTENT ASTHMA WITHOUT COMPLICATION: ICD-10-CM

## 2022-01-05 DIAGNOSIS — F33.9 DEPRESSION, RECURRENT (HCC): ICD-10-CM

## 2022-01-05 DIAGNOSIS — E83.52 HYPERCALCEMIA: ICD-10-CM

## 2022-01-05 DIAGNOSIS — E80.6 HYPERBILIRUBINEMIA: ICD-10-CM

## 2022-01-05 DIAGNOSIS — I10 PRIMARY HYPERTENSION: Primary | ICD-10-CM

## 2022-01-05 DIAGNOSIS — R79.89 LOW TESTOSTERONE: ICD-10-CM

## 2022-01-05 PROCEDURE — 99214 OFFICE O/P EST MOD 30 MIN: CPT | Performed by: FAMILY MEDICINE

## 2022-01-05 PROCEDURE — 3008F BODY MASS INDEX DOCD: CPT | Performed by: FAMILY MEDICINE

## 2022-01-05 PROCEDURE — 3074F SYST BP LT 130 MM HG: CPT | Performed by: FAMILY MEDICINE

## 2022-01-05 PROCEDURE — 3078F DIAST BP <80 MM HG: CPT | Performed by: FAMILY MEDICINE

## 2022-01-05 PROCEDURE — 1036F TOBACCO NON-USER: CPT | Performed by: FAMILY MEDICINE

## 2022-01-05 RX ORDER — FENOFIBRATE 48 MG/1
TABLET, COATED ORAL
Qty: 45 TABLET | Refills: 0 | Status: SHIPPED | OUTPATIENT
Start: 2022-01-05 | End: 2022-01-16

## 2022-01-05 RX ORDER — FENOFIBRATE 48 MG/1
48 TABLET, COATED ORAL DAILY
Qty: 45 TABLET | Refills: 0 | Status: SHIPPED | OUTPATIENT
Start: 2022-01-05 | End: 2022-01-05 | Stop reason: SDUPTHER

## 2022-01-05 NOTE — PATIENT INSTRUCTIONS
Repeat bloodwork in 1 month- fasting (will also include testosterone)  Patient to call for results if he/she does not hear from us    Schedule ultrasound abdomen    Keep returned visit in April    Add tricor 48 mg every other am - for triglycerides    Limit sweets and sweetened beverages - ie: alcohol intake      Consider AA    Call with any questions/concerns

## 2022-01-05 NOTE — PROGRESS NOTES
FAMILY PRACTICE OFFICE VISIT    NAME: Moreno Trent    AGE: 54 y o  SEX: male  : 1970   MRN: [id number]    DATE: 2022  TIME: 5:14 PM    Assessment and Plan   1  Primary hypertension  Well controlled      2  Hypertriglyceridemia  Will add tricor to every other day dosing in the am  And closely monitor lft's  lft's stable over time  Benefits of treating hypertrig at this time outweigh risks of medication  As pt's trig's are over 500 putting him at risk for MI, CVA and pancreatitis  Currently denies any s/s pancreatitis      May need cardio input if unable to get #'s down  Remain on crestor  3  Abnormal LFTs  Elevated for years  Had a full workup with gI - 2020  With labs to r/o autoimmune and other forms of hepatitis  Will repeat US  May need to return to GI pending results  Urge complete etoh cessation  Offered substance abuse program for detoxing  Pt states he has the information    Has egd and colonoscopy scheduled for 2022  Taking PPI bid  And has not had to take pepcid  4  Hyperglycemia  Discussed that multiple lab abnormalities may be related to etoh intake  Pt aware  Limit sugars  advise increase in whole grains - fresh fruits, veggies and whole grain cereals and breads; and less simple sugars, processed foods and white floured products, including decreasing intake if sweetened beverages; also encourage exercise for overall cardiovascular health    Urge weight loss        5  Hypercalcemia  Repeat ordered      6  Hyperproteinemia  Repeat ordered      7  Hyperbilirubinemia  Repeat ordered      8  Low testosterone  Repeat ordered  Pending results - may need uro  To consider viagra pending results as well  Worked for pt in the past      9  Hyponatremia  Repeat ordered  10  Hypokalemia  Repeat labs    11  Depression  Is seeing a counselor  H/o taking zoloft years ago - did not help patient much  To consider trial of another med at future visit      Had recent pulm 2021 visit for f/u on his sarcoidosis (dx by bronchoscopy 2007)  Had spirometry, CXR done thru pulm  Pt to continue with albuterol prn and return for spirometry with pulm in 1 year  And has severe ELEONORA but unable to find machine CPAP    Note - multiple lab abnormalities have been present on and off to some degree for years    Return as scheduled with me in April 2022    Patient Instructions   Repeat bloodwork in 1 month- fasting (will also include testosterone)  Patient to call for results if he/she does not hear from us    Schedule ultrasound abdomen    Keep returned visit in April    Add tricor 48 mg every other am - for triglycerides    Limit sweets and sweetened beverages - ie: alcohol intake  Consider AA    Call with any questions/concerns            Chief Complaint   No chief complaint on file  History of Present Illness   Ricki Lay is a 54y o -year-old male who presents today for discussion of multiple abnormal labs  Pt does admit to increased etoh intake  This has been going on for a couple of years  Is seeing a counselor    Review of Systems   Review of Systems   Constitutional: Negative for fever  Respiratory: Positive for apnea  Negative for cough, shortness of breath and wheezing  Pt's breathing has been stable overall  Using albuterol about 2x/week on avg - which is a little more than avg - but began walking more then he was  Cardiovascular: Negative for chest pain and palpitations  Gastrointestinal: Negative for abdominal pain, blood in stool, diarrhea, nausea and vomiting  Not having breakthru symptoms of gerd  On the bid PPI dosing  Normal color stool  No changes in bowels  No abdominal pain   Genitourinary: Negative for dysuria  Urine normal color   Psychiatric/Behavioral: Negative for self-injury and suicidal ideas          Sees a wellness counselor q 2 weeks    Has more anxiety in the am  And then seems to subside somewhat thru the day    Lives with his mother           Active Problem List     Patient Active Problem List   Diagnosis    Allergic rhinitis    Muro's esophagus    Asthma, mild intermittent    Hematuria    Hiatal hernia    Hypercholesterolemia    Hypertension    Impaired fasting glucose    Sarcoidosis    Sleep apnea    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    Anxiety    Abnormal LFTs    Arm paresthesia, left    Alcohol screening    Overweight    Fatigue    Current mild episode of major depressive disorder (HCC)    Depression, recurrent (HCC)         Past Medical History:  Past Medical History:   Diagnosis Date    Chest pain     Sarcoidosis        Past Surgical History:  Past Surgical History:   Procedure Laterality Date    BRONCHOSCOPY      COLONOSCOPY      complete     CYSTOSCOPY      Diagnostic - Neg     ESOPHAGOGASTRODUODENOSCOPY      Diagnostic        Family History:  Family History   Problem Relation Age of Onset    Colon cancer Mother         age in 35s   Lalita Nine Hyperlipidemia Mother     Hyperlipidemia Father     Pancreatic cancer Father         Adenocarcinoma of the ampulla of vater     Colon cancer Maternal Uncle     Colon cancer Other         maternal relatives - likely MGF and a second cousin in her 35s       Social History:  Social History     Socioeconomic History    Marital status:      Spouse name: Not on file    Number of children: Not on file    Years of education: Not on file    Highest education level: Not on file   Occupational History    Occupation:     Tobacco Use    Smoking status: Former Smoker     Types: Cigarettes     Quit date:      Years since quittin 0    Smokeless tobacco: Former User    Tobacco comment: smoked about 1 pack every 2 weeks for 2 years in his 19's    Vaping Use    Vaping Use: Never used   Substance and Sexual Activity    Alcohol use: Yes     Comment: reports down to 5 drinks once weekly    Drug use: No    Sexual activity: Not on file   Other Topics Concern    Not on file   Social History Narrative    Lives home with wife and daughter      Social Determinants of Health     Financial Resource Strain: Not on file   Food Insecurity: Not on file   Transportation Needs: Not on file   Physical Activity: Not on file   Stress: Not on file   Social Connections: Not on file   Intimate Partner Violence: Not on file   Housing Stability: Not on file       Objective   There were no vitals filed for this visit  Wt Readings from Last 3 Encounters:   12/02/21 90 7 kg (200 lb)   10/28/21 90 9 kg (200 lb 6 4 oz)   03/26/21 94 7 kg (208 lb 12 8 oz)       Physical Exam  Vitals and nursing note reviewed  Constitutional:       General: He is not in acute distress  Appearance: Normal appearance  He is not ill-appearing or toxic-appearing  Comments: Good facial coloring     Eyes:      General: No scleral icterus  Conjunctiva/sclera: Conjunctivae normal       Comments: No icterus     Cardiovascular:      Rate and Rhythm: Normal rate and regular rhythm  Pulses: Normal pulses  Heart sounds: Normal heart sounds  No murmur heard  Pulmonary:      Effort: Pulmonary effort is normal  No respiratory distress  Breath sounds: Normal breath sounds  No wheezing, rhonchi or rales  Abdominal:      General: Abdomen is flat  There is no distension  Palpations: Abdomen is soft  There is no mass  Tenderness: There is no abdominal tenderness  There is no guarding or rebound  Comments: No fluid wave abdominal tenderness/distension   Musculoskeletal:      Right lower leg: No edema  Left lower leg: No edema  Lymphadenopathy:      Cervical: No cervical adenopathy  Skin:     Coloration: Skin is not jaundiced  Neurological:      General: No focal deficit present  Mental Status: He is alert and oriented to person, place, and time     Psychiatric:         Mood and Affect: Mood normal          Behavior: Behavior normal          Thought Content: Thought content normal          Judgment: Judgment normal       Comments: Pt's mood appropriate           Pertinent Laboratory/Diagnostic Studies:  Lab Results   Component Value Date    GLUCOSE 99 07/25/2014    BUN 25 12/21/2021    CREATININE 1 16 12/21/2021    CALCIUM 10 9 (H) 12/21/2021     07/25/2014    K 3 2 (L) 12/21/2021    CO2 28 12/21/2021    CL 96 (L) 12/21/2021     Lab Results   Component Value Date     (H) 12/21/2021    AST 99 (H) 12/21/2021    ALKPHOS 81 12/21/2021    BILITOT 0 66 07/25/2014       Lab Results   Component Value Date    WBC 8 01 10/14/2020    HGB 16 5 10/14/2020    HCT 47 5 10/14/2020    MCV 95 10/14/2020     10/14/2020       No results found for: TSH    Lab Results   Component Value Date    CHOL 239 07/25/2014     Lab Results   Component Value Date    TRIG 520 (H) 12/21/2021     Lab Results   Component Value Date    HDL 47 12/21/2021     Lab Results   Component Value Date    LDLCALC  12/21/2021      Comment:      Calculated LDL invalid, triglycerides >400 mg/dl  This screening LDL is a calculated result  It does not have the accuracy of the Direct Measured LDL in the monitoring of patients with hyperlipidemia and/or statin therapy  Direct Measure LDL (MUX456) must be ordered separately in these patients       Lab Results   Component Value Date    HGBA1C 5 0 06/26/2020       Results for orders placed or performed in visit on 12/21/21   Hepatitis C antibody   Result Value Ref Range    Hepatitis C Ab Non-reactive Non-reactive   HIV 1/2 Antigen/Antibody (4th Generation) w Reflex [facility]   Result Value Ref Range    HIV-1/HIV-2 Ab Non-Reactive Non-Reactive   Lipid panel   Result Value Ref Range    Cholesterol 228 (H) See Comment mg/dL    Triglycerides 520 (H) See Comment mg/dL    HDL, Direct 47 >=40 mg/dL    LDL Calculated      Non-HDL-Chol (CHOL-HDL) 181 mg/dl   TSH, 3rd generation   Result Value Ref Range    TSH 3RD GENERATON 3 220 0 358 - 3 740 uIU/mL   PSA, Total Screen   Result Value Ref Range    PSA 1 2 0 0 - 4 0 ng/mL   Testosterone, free, total   Result Value Ref Range    Testosterone, Free 22 0 (H) 6 8 - 21 5 pg/mL    TESTOSTERONE TOTAL 435 264 - 916 ng/dL   Magnesium   Result Value Ref Range    Magnesium 2 2 1 6 - 2 6 mg/dL   Comprehensive metabolic panel   Result Value Ref Range    Sodium 133 (L) 136 - 145 mmol/L    Potassium 3 2 (L) 3 5 - 5 3 mmol/L    Chloride 96 (L) 100 - 108 mmol/L    CO2 28 21 - 32 mmol/L    ANION GAP 9 4 - 13 mmol/L    BUN 25 5 - 25 mg/dL    Creatinine 1 16 0 60 - 1 30 mg/dL    Glucose, Fasting 118 (H) 65 - 99 mg/dL    Calcium 10 9 (H) 8 3 - 10 1 mg/dL    AST 99 (H) 5 - 45 U/L     (H) 12 - 78 U/L    Alkaline Phosphatase 81 46 - 116 U/L    Total Protein 8 7 (H) 6 4 - 8 2 g/dL    Albumin 4 1 3 5 - 5 0 g/dL    Total Bilirubin 1 49 (H) 0 20 - 1 00 mg/dL    eGFR 73 ml/min/1 73sq m       No orders of the defined types were placed in this encounter        ALLERGIES:  Allergies   Allergen Reactions    Other      seasonal       Current Medications     Current Outpatient Medications   Medication Sig Dispense Refill    albuterol (PROAIR HFA) 90 mcg/act inhaler Inhale 2 puffs every 6 (six) hours as needed      amLODIPine (NORVASC) 10 mg tablet TAKE 1 TABLET BY MOUTH EVERY DAY 90 tablet 0    atenolol (TENORMIN) 100 mg tablet TAKE 1 TABLET BY MOUTH EVERY DAY 90 tablet 0    benazepril (LOTENSIN) 40 MG tablet Take 1 tab po once daily - this reflects a decrease in dose as of 10/29/2021; please disregard rx sent on 10/28/2021 90 tablet 0    Blood Glucose Monitoring Suppl (FreeStyle Freedom Lite) w/Device KIT Use to test 2x daily, 2hours after a meal 1 each 1    esomeprazole (NexIUM) 40 MG capsule Take 40 mg by mouth 2 (two) times a day    0    fluticasone (FLONASE) 50 mcg/act nasal spray 1 spray into each nostril daily as needed for rhinitis      glucose blood (FREESTYLE LITE) test strip USE AS INSTRUCTED WITH GLUCOMETER TO TEST BLOOD GLUCOSE 100 each 1    hydrochlorothiazide (HYDRODIURIL) 25 mg tablet Take 1 tablet (25 mg total) by mouth daily 90 tablet 2    Lancets (freestyle) lancets USE TO TEST BLOOD SUGARSS 2X DAILY 2 HOURS AFTER MEALS 100 each 0    Milk Thistle 250 MG CAPS Take 1 caplet by mouth daily      rosuvastatin (CRESTOR) 40 MG tablet Take 1 tablet (40 mg total) by mouth daily 90 tablet 2    sildenafil (VIAGRA) 50 MG tablet Take 1 tablet (50 mg total) by mouth daily as needed for erectile dysfunction Take 30-60 min prior to sexual activity; do not take more than 1 tab in 24 hours  10 tablet 0     No current facility-administered medications for this visit           Health Maintenance     Health Maintenance   Topic Date Due    Influenza Vaccine (1) 09/01/2021    COVID-19 Vaccine (3 - Booster for Moderna series) 09/04/2021    Colorectal Cancer Screening  12/11/2021    BMI: Followup Plan  03/17/2022    Depression Remission PHQ  03/17/2022    Annual Physical  10/28/2022    BMI: Adult  12/02/2022    DTaP,Tdap,and Td Vaccines (3 - Td or Tdap) 01/11/2027    Pneumococcal Vaccine: Pediatrics (0 to 5 Years) and At-Risk Patients (6 to 59 Years) (2 of 2 - PPSV23) 02/21/2037    HIV Screening  Completed    Hepatitis C Screening  Completed    HIB Vaccine  Aged Out    Hepatitis B Vaccine  Aged Out    IPV Vaccine  Aged Out    Hepatitis A Vaccine  Aged Out    Meningococcal ACWY Vaccine  Aged Out    HPV Vaccine  Aged Out     Immunization History   Administered Date(s) Administered    COVID-19 MODERNA VACC 0 5 ML IM 02/04/2021, 03/04/2021    INFLUENZA 10/12/2018    Influenza Injectable, MDCK, Preservative Free, Quadrivalent, 0 5 mL 10/10/2020    Influenza Quadrivalent Preservative Free 3 years and older IM 10/01/2017    Influenza, recombinant, quadrivalent,injectable, preservative free 09/06/2019    Influenza, seasonal, injectable 01/02/2007, 11/08/2015    Pneumococcal Polysaccharide PPV23 12/16/2020    Tdap 11/30/2015, 01/11/2017          Severiano Feng DO

## 2022-01-16 DIAGNOSIS — E78.1 HYPERTRIGLYCERIDEMIA: ICD-10-CM

## 2022-01-16 RX ORDER — FENOFIBRATE 48 MG/1
TABLET, COATED ORAL
Qty: 45 TABLET | Refills: 0 | Status: SHIPPED | OUTPATIENT
Start: 2022-01-16 | End: 2022-07-15

## 2022-04-22 ENCOUNTER — HOSPITAL ENCOUNTER (OUTPATIENT)
Dept: ULTRASOUND IMAGING | Facility: HOSPITAL | Age: 52
Discharge: HOME | End: 2022-04-22
Attending: FAMILY MEDICINE
Payer: COMMERCIAL

## 2022-04-22 ENCOUNTER — LAB (OUTPATIENT)
Dept: LAB | Facility: HOSPITAL | Age: 52
End: 2022-04-22
Attending: FAMILY MEDICINE
Payer: COMMERCIAL

## 2022-04-22 DIAGNOSIS — R79.89 LOW TESTOSTERONE: ICD-10-CM

## 2022-04-22 DIAGNOSIS — E78.1 HYPERTRIGLYCERIDEMIA: ICD-10-CM

## 2022-04-22 DIAGNOSIS — R79.89 ABNORMAL LFTS: ICD-10-CM

## 2022-04-22 LAB
ALBUMIN SERPL BCP-MCNC: 3.9 G/DL (ref 3.5–5)
ALP SERPL-CCNC: 71 U/L (ref 46–116)
ALT SERPL W P-5'-P-CCNC: 160 U/L (ref 12–78)
ANION GAP SERPL CALCULATED.3IONS-SCNC: 11 MMOL/L (ref 4–13)
AST SERPL W P-5'-P-CCNC: 145 U/L (ref 5–45)
BILIRUB SERPL-MCNC: 1.15 MG/DL (ref 0.2–1)
BUN SERPL-MCNC: 30 MG/DL (ref 5–25)
CALCIUM SERPL-MCNC: 10 MG/DL (ref 8.3–10.1)
CHLORIDE SERPL-SCNC: 96 MMOL/L (ref 100–108)
CK SERPL-CCNC: 94 U/L (ref 39–308)
CO2 SERPL-SCNC: 31 MMOL/L (ref 21–32)
CREAT SERPL-MCNC: 1.42 MG/DL (ref 0.6–1.3)
GFR SERPL CREATININE-BSD FRML MDRD: 57 ML/MIN/1.73SQ M
GLUCOSE P FAST SERPL-MCNC: 122 MG/DL (ref 65–99)
LIPASE SERPL-CCNC: 242 U/L (ref 73–393)
POTASSIUM SERPL-SCNC: 3.2 MMOL/L (ref 3.5–5.3)
PROT SERPL-MCNC: 8.5 G/DL (ref 6.4–8.2)
SODIUM SERPL-SCNC: 138 MMOL/L (ref 136–145)
TRIGL SERPL-MCNC: 236 MG/DL

## 2022-04-22 PROCEDURE — 76700 US EXAM ABDOM COMPLETE: CPT

## 2022-04-22 PROCEDURE — 36415 COLL VENOUS BLD VENIPUNCTURE: CPT

## 2022-04-22 PROCEDURE — 84478 ASSAY OF TRIGLYCERIDES: CPT

## 2022-04-22 PROCEDURE — 84402 ASSAY OF FREE TESTOSTERONE: CPT

## 2022-04-22 PROCEDURE — 80053 COMPREHEN METABOLIC PANEL: CPT

## 2022-04-22 PROCEDURE — 83690 ASSAY OF LIPASE: CPT

## 2022-04-22 PROCEDURE — 84403 ASSAY OF TOTAL TESTOSTERONE: CPT

## 2022-04-22 PROCEDURE — 82550 ASSAY OF CK (CPK): CPT

## 2022-04-23 LAB
TESTOST FREE SERPL-MCNC: 27.9 PG/ML (ref 7.2–24)
TESTOST SERPL-MCNC: 438 NG/DL (ref 264–916)

## 2022-04-28 ENCOUNTER — OFFICE VISIT (OUTPATIENT)
Dept: FAMILY MEDICINE CLINIC | Facility: CLINIC | Age: 52
End: 2022-04-28
Payer: COMMERCIAL

## 2022-04-28 VITALS
OXYGEN SATURATION: 98 % | SYSTOLIC BLOOD PRESSURE: 110 MMHG | HEIGHT: 70 IN | RESPIRATION RATE: 18 BRPM | BODY MASS INDEX: 27.92 KG/M2 | WEIGHT: 195 LBS | DIASTOLIC BLOOD PRESSURE: 76 MMHG | HEART RATE: 72 BPM

## 2022-04-28 DIAGNOSIS — F33.9 DEPRESSION, RECURRENT (HCC): ICD-10-CM

## 2022-04-28 DIAGNOSIS — I10 PRIMARY HYPERTENSION: Primary | ICD-10-CM

## 2022-04-28 DIAGNOSIS — G47.33 OBSTRUCTIVE SLEEP APNEA SYNDROME: ICD-10-CM

## 2022-04-28 DIAGNOSIS — E87.6 HYPOKALEMIA: ICD-10-CM

## 2022-04-28 DIAGNOSIS — Z80.0 FAMILY HISTORY OF LYNCH SYNDROME: ICD-10-CM

## 2022-04-28 DIAGNOSIS — I10 ESSENTIAL HYPERTENSION: ICD-10-CM

## 2022-04-28 DIAGNOSIS — R73.01 IMPAIRED FASTING GLUCOSE: ICD-10-CM

## 2022-04-28 DIAGNOSIS — R79.89 ABNORMAL LFTS: ICD-10-CM

## 2022-04-28 DIAGNOSIS — K22.70 BARRETT'S ESOPHAGUS WITHOUT DYSPLASIA: ICD-10-CM

## 2022-04-28 DIAGNOSIS — E78.00 HYPERCHOLESTEROLEMIA: ICD-10-CM

## 2022-04-28 DIAGNOSIS — E88.09 HYPERPROTEINEMIA: ICD-10-CM

## 2022-04-28 PROCEDURE — 99214 OFFICE O/P EST MOD 30 MIN: CPT | Performed by: FAMILY MEDICINE

## 2022-04-28 PROCEDURE — 3008F BODY MASS INDEX DOCD: CPT | Performed by: FAMILY MEDICINE

## 2022-04-28 PROCEDURE — 1036F TOBACCO NON-USER: CPT | Performed by: FAMILY MEDICINE

## 2022-04-28 RX ORDER — SILDENAFIL 50 MG/1
50 TABLET, FILM COATED ORAL DAILY PRN
Qty: 10 TABLET | Refills: 4 | Status: SHIPPED | OUTPATIENT
Start: 2022-04-28

## 2022-04-28 RX ORDER — TRAZODONE HYDROCHLORIDE 50 MG/1
50 TABLET ORAL
COMMUNITY
Start: 2022-04-22 | End: 2022-05-17

## 2022-04-28 NOTE — PROGRESS NOTES
FAMILY PRACTICE OFFICE VISIT    NAME: Moreno Trent    AGE: 50 y o  SEX: male  : 1970   MRN: [id number]    DATE: 2022  TIME: 4:30 PM    Assessment and Plan    1  Primary hypertension      2  Essential hypertension  Good control  - sildenafil (VIAGRA) 50 MG tablet; Take 1 tablet (50 mg total) by mouth daily as needed for erectile dysfunction Take 30-60 min prior to sexual activity; do not take more than 1 tab in 24 hours  Dispense: 10 tablet; Refill: 4    3  Hypercholesterolemia  Overall improving with trig's    - sildenafil (VIAGRA) 50 MG tablet; Take 1 tablet (50 mg total) by mouth daily as needed for erectile dysfunction Take 30-60 min prior to sexual activity; do not take more than 1 tab in 24 hours  Dispense: 10 tablet; Refill: 4    4  Abnormal LFTs  Not at 3x upper limit of normal  Will send back to gi   And remain on statin and tricor for now as tricor addition seems to be helping    - sildenafil (VIAGRA) 50 MG tablet; Take 1 tablet (50 mg total) by mouth daily as needed for erectile dysfunction Take 30-60 min prior to sexual activity; do not take more than 1 tab in 24 hours  Dispense: 10 tablet; Refill: 4    5  Impaired fasting glucose  Check hba1c    - sildenafil (VIAGRA) 50 MG tablet; Take 1 tablet (50 mg total) by mouth daily as needed for erectile dysfunction Take 30-60 min prior to sexual activity; do not take more than 1 tab in 24 hours  Dispense: 10 tablet; Refill: 4  - HEMOGLOBIN A1C W/ EAG ESTIMATION; Future    6  Muro's esophagus without dysplasia  As below      7  Obstructive sleep apnea syndrome  Recommend compliance with cpap  8  [facility]  Goes to a wellness counselor  PHQ-0 score of 9  Is now taking trazodone prn  For insomnia        9  Hyperproteinemia  Recommend spep   May need to discuss with hematology pending results  Will also be seeing hematology for genetic risk testing    - Protein electrophoresis, serum; Future    10   Family history of Barakat syndrome  Send to oncology  - Ambulatory Referral to Hematology / Oncology; Future    11  Hypokalemia  Note - reviewed labs in detail - and calcium, and sodium return to normal  Potassium remains low  Recommend eating a banana daily    Sodium is now normal    There are no Patient Instructions on file for this visit  Continue to encourage pt to cut back on and ultimately quit use of etoh  Note - reviewed labs in detail - and calcium, and sodium return to normal  Potassium remains low  Recommend eating a banana daily    Had colonoscopy 2/2022 - multiple polyps recommend repeating in 1 year  And egd 2/2022 - gastritis - repeat in 2 years    Recommend return visit for cryo to hand lesions-  Possible actinic keratosis    Patient Instructions   Return to see GI - regarding elevated liver function testing and hepato/splenomegaly - [phone]    Get some additional labs- to further evaluate protein being high and also a1c (for sugars) and potassium  Wait and have done nonfasting in a couple of weeks      Remain on current meds    Genetic risk testing - recommended thru oncology      Return for cryo to hand lesions          Chief Complaint     Chief Complaint   Patient presents with    Follow-up     6Month       History of Present Illness   Claudetta Leisure is a 50y o -year-old male who presents today to review BP and recent labs which were done to further evaluate and f/u on previous abnormalities      Pt has cut back on etoh use        Review of Systems   Review of Systems   Constitutional: Negative for unexpected weight change  Respiratory: Positive for apnea  Negative for cough, shortness of breath and wheezing  Has cpap - and struggles with it  Has a full face mask which is a little better  Uses it the best he can  Occasionally uses MDI   Cardiovascular: Negative for chest pain and palpitations  Gastrointestinal: Negative for abdominal pain          No change in color of stools Genitourinary: Negative for dysuria  Taking viagra for ED  No urinary complaints     Skin:        Couple lesions on hands - scaling  Not bleeding     Allergic/Immunologic: Positive for environmental allergies     Psychiatric/Behavioral:        Taking trazodone now - rx thru his sister - taking prn  Melatonin did not work    Seeing wellness counselor         Active Problem List     Patient Active Problem List   Diagnosis    Allergic rhinitis    Muro's esophagus    Asthma, mild intermittent    Hematuria    Hiatal hernia    Hypercholesterolemia    Hypertension    Impaired fasting glucose    Sarcoidosis    Sleep apnea    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    Anxiety    Abnormal LFTs    Arm paresthesia, left    Alcohol screening    Overweight    Fatigue    Current mild episode of major depressive disorder (HCC)    Depression, recurrent ([facility] )         Past Medical History:  Past Medical History:   Diagnosis Date    Chest pain     Sarcoidosis        Past Surgical History:  Past Surgical History:   Procedure Laterality Date    BRONCHOSCOPY      COLONOSCOPY      complete     CYSTOSCOPY      Diagnostic - Neg 2007    ESOPHAGOGASTRODUODENOSCOPY      Diagnostic        Family History:  Family History   Problem Relation Age of Onset    Colon cancer Mother         age in 35s   [facility] Hyperlipidemia Mother     Hyperlipidemia Father     Pancreatic cancer Father         Adenocarcinoma of the ampulla of vater     Colon cancer Maternal Uncle     Colon cancer Other         maternal relatives - likely MGF and a second cousin in her 35s       Social History:  Social History     Socioeconomic History    Marital status:      Spouse name: Not on file    Number of children: Not on file    Years of education: Not on file    Highest education level: Not on file   Occupational History    Occupation:     Tobacco Use    Smoking status: Former Smoker Types: Cigarettes     Quit date: 38     Years since quittin 3    Smokeless tobacco: Former User    Tobacco comment: smoked about 1 pack every 2 weeks for 2 years in his 19's    Vaping Use    Vaping Use: Never used   Substance and Sexual Activity    Alcohol use: Yes     Comment: reports down to 5 drinks once weekly    Drug use: No    Sexual activity: Not on file   Other Topics Concern    Not on file   Social History Narrative    Lives home with wife and daughter      Social Determinants of Health     Financial Resource Strain: Not on file   Food Insecurity: Not on file   Transportation Needs: Not on file   Physical Activity: Not on file   Stress: Not on file   Social Connections: Not on file   Intimate Partner Violence: Not on file   Housing Stability: Not on file       Objective     Vitals:    22 1619   BP: 110/76   Pulse: 72   Resp: 18   SpO2: 98%     Wt Readings from Last 3 Encounters:   22 88 5 kg (195 lb)   22 88 9 kg (196 lb)   21 90 7 kg (200 lb)       Physical Exam  Vitals and nursing note reviewed  Constitutional:       General: He is not in acute distress  Appearance: Normal appearance  He is not ill-appearing or toxic-appearing  Eyes:      General: No scleral icterus  Neck:      Vascular: No carotid bruit  Cardiovascular:      Rate and Rhythm: Normal rate and regular rhythm  Pulses: Normal pulses  Heart sounds: Normal heart sounds  No murmur heard  Pulmonary:      Effort: Pulmonary effort is normal  No respiratory distress  Breath sounds: Normal breath sounds  No wheezing, rhonchi or rales  Abdominal:      General: Abdomen is flat  There is no distension  Palpations: Abdomen is soft  There is no mass  Tenderness: There is no abdominal tenderness  There is no guarding or rebound  Musculoskeletal:      Cervical back: Neck supple  Right lower leg: No edema  Left lower leg: No edema     Lymphadenopathy:      Cervical: No cervical adenopathy  Skin:     General: Skin is warm  Coloration: Skin is not jaundiced  Comments: 2 raised and whitish scaly lesions - dorsum of right hand as well as 1 on left hand  Not friable    Small hyperpigmented flat macule - bridge of nose - measuring less than 2 mm - smooth borders  Color is uniform  Not raised or friable  No change as per pt - if change - recommend derm - as pt had prior procedure done there-  He does not recall cancer       Neurological:      General: No focal deficit present  Mental Status: He is alert and oriented to person, place, and time  Psychiatric:         Behavior: Behavior normal          Thought Content: Thought content normal          Judgment: Judgment normal       Comments: Flat affect but essentially unchanged from prior         Pertinent Laboratory/Diagnostic Studies:  Lab Results   Component Value Date    GLUCOSE 99 07/25/2014    BUN 30 (H) 04/22/2022    CREATININE 1 42 (H) 04/22/2022    CALCIUM 10 0 04/22/2022     07/25/2014    K 3 2 (L) 04/22/2022    CO2 31 04/22/2022    CL 96 (L) 04/22/2022     Lab Results   Component Value Date     (H) 04/22/2022     (H) 04/22/2022    ALKPHOS 71 04/22/2022    BILITOT 0 66 07/25/2014       Lab Results   Component Value Date    WBC 8 01 10/14/2020    HGB 16 5 10/14/2020    HCT 47 5 10/14/2020    MCV 95 10/14/2020     10/14/2020       No results found for: TSH    Lab Results   Component Value Date    CHOL 239 07/25/2014     Lab Results   Component Value Date    TRIG 236 (H) 04/22/2022     Lab Results   Component Value Date    HDL 47 12/21/2021     Lab Results   Component Value Date    [facility]  12/21/2021      Comment:      Calculated LDL invalid, triglycerides >400 mg/dl  This screening LDL is a calculated result  It does not have the accuracy of the Direct Measured LDL in the monitoring of patients with hyperlipidemia and/or statin therapy     Direct Measure LDL (GPA026) must be ordered separately in these patients  Lab Results   Component Value Date    HGBA1C 5 0 06/26/2020       Results for orders placed or performed in visit on 04/22/22   Comprehensive metabolic panel   Result Value Ref Range    Sodium 138 136 - 145 mmol/L    Potassium 3 2 (L) 3 5 - 5 3 mmol/L    Chloride 96 (L) 100 - 108 mmol/L    CO2 31 21 - 32 mmol/L    ANION GAP 11 4 - 13 mmol/L    BUN 30 (H) 5 - 25 mg/dL    Creatinine 1 42 (H) 0 60 - 1 30 mg/dL    Glucose, Fasting 122 (H) 65 - 99 mg/dL    Calcium 10 0 8 3 - 10 1 mg/dL     (H) 5 - 45 U/L     (H) 12 - 78 U/L    Alkaline Phosphatase 71 46 - 116 U/L    Total Protein 8 5 (H) 6 4 - 8 2 g/dL    Albumin 3 9 3 5 - 5 0 g/dL    Total Bilirubin 1 15 (H) 0 20 - 1 00 mg/dL    eGFR 57 ml/min/1 73sq m   Lipase   Result Value Ref Range    Lipase 242 73 - 393 u/L   Triglycerides   Result Value Ref Range    Triglycerides 236 (H) See Comment mg/dL   Testosterone, free, total   Result Value Ref Range    Testosterone, Free 27 9 (H) 7 2 - 24 0 pg/mL    TESTOSTERONE TOTAL 438 264 - 916 ng/dL   CK (with reflex to MB)   Result Value Ref Range    Total CK 94 39 - 308 U/L       No orders of the defined types were placed in this encounter        ALLERGIES:  Allergies   Allergen Reactions    Other      seasonal       Current Medications     Current Outpatient Medications   Medication Sig Dispense Refill    albuterol (PROAIR HFA) 90 mcg/act inhaler Inhale 2 puffs every 6 (six) hours as needed      amLODIPine (NORVASC) 10 mg tablet TAKE 1 TABLET BY MOUTH EVERY DAY 90 tablet 0    atenolol (TENORMIN) 100 mg tablet TAKE 1 TABLET BY MOUTH EVERY DAY 90 tablet 0    benazepril (LOTENSIN) 40 MG tablet Take 1 tab po once daily - this reflects a decrease in dose as of 10/29/2021; please disregard rx sent on 10/28/2021 90 tablet 0    Blood Glucose Monitoring Suppl (FreeStyle Freedom Lite) w/Device KIT Use to test 2x daily, 2hours after a meal 1 each 1    esomeprazole (NexIUM) 40 MG capsule Take 40 mg by mouth 2 (two) times a day    0    fenofibrate (TRICOR) 48 mg tablet TAKE 1 TAB BY MOUTH EVERY OTHER DAY - IN THE AM 45 tablet 0    fluticasone (FLONASE) 50 mcg/act nasal spray 1 spray into each nostril daily as needed for rhinitis      glucose blood (FREESTYLE LITE) test strip USE AS INSTRUCTED WITH GLUCOMETER TO TEST BLOOD GLUCOSE 100 each 1    hydrochlorothiazide (HYDRODIURIL) 25 mg tablet Take 1 tablet (25 mg total) by mouth daily 90 tablet 2    Lancets (freestyle) lancets USE TO TEST BLOOD SUGARSS 2X DAILY 2 HOURS AFTER MEALS 100 each 0    Milk Thistle 250 MG CAPS Take 1 caplet by mouth daily      rosuvastatin (CRESTOR) 40 MG tablet Take 1 tablet (40 mg total) by mouth daily 90 tablet 2    sildenafil (VIAGRA) 50 MG tablet Take 1 tablet (50 mg total) by mouth daily as needed for erectile dysfunction Take 30-60 min prior to sexual activity; do not take more than 1 tab in 24 hours  10 tablet 0    traZODone (DESYREL) 50 mg tablet Take 50 mg by mouth daily at bedtime as needed       No current facility-administered medications for this visit           Health Maintenance     Health Maintenance   Topic Date Due    COVID-19 Vaccine (3 - Booster for Moderna series) 08/04/2021    BMI: Followup Plan  03/17/2022    Depression Remission PHQ  03/17/2022    Annual Physical  10/28/2022    Colorectal Cancer Screening  02/11/2023    BMI: Adult  04/28/2023    DTaP,Tdap,and Td Vaccines (3 - Td or Tdap) 01/11/2027    Pneumococcal Vaccine: Pediatrics (0 to 5 Years) and At-Risk Patients (6 to 59 Years) (2 of 2 - PPSV23) 02/21/2037    HIV Screening  Completed    Hepatitis C Screening  Completed    Influenza Vaccine  Completed    HIB Vaccine  Aged Out    Hepatitis B Vaccine  Aged Out    IPV Vaccine  Aged Out    Hepatitis A Vaccine  Aged Out    Meningococcal ACWY Vaccine  Aged Out    HPV Vaccine  Aged Out     Immunization History   Administered Date(s) Administered    COVID-19 MODERNA VACC 0 5 ML IM 02/04/2021, 03/04/2021    INFLUENZA 10/12/2018, 12/02/2021    Influenza Injectable, MDCK, Preservative Free, Quadrivalent, 0 5 mL 10/10/2020    Influenza Quadrivalent Preservative Free 3 years and older IM 10/01/2017    Influenza, recombinant, quadrivalent,injectable, preservative free 09/06/2019    Influenza, seasonal, injectable 01/02/2007, 11/08/2015    Pneumococcal Polysaccharide PPV23 12/16/2020    Tdap 11/30/2015, 01/11/2017          Jonathan Nina, DO

## 2022-04-28 NOTE — PATIENT INSTRUCTIONS
Return to see GI - regarding elevated liver function testing and hepato/splenomegaly - 839 - [phone] - [phone]    Get some additional labs- to further evaluate protein being high and also a1c (for sugars) and potassium  Wait and have done nonfasting in a couple of weeks      Remain on current meds    Genetic risk testing - recommended thru oncology      Return for cryo to hand lesions

## 2022-05-05 DIAGNOSIS — I10 ESSENTIAL HYPERTENSION: ICD-10-CM

## 2022-05-05 RX ORDER — BENAZEPRIL HYDROCHLORIDE 40 MG/1
TABLET, FILM COATED ORAL
Qty: 90 TABLET | Refills: 0 | Status: SHIPPED | OUTPATIENT
Start: 2022-05-05 | End: 2022-08-10

## 2022-05-17 ENCOUNTER — TELEPHONE (OUTPATIENT)
Dept: FAMILY MEDICINE CLINIC | Facility: CLINIC | Age: 52
End: 2022-05-17

## 2022-05-17 ENCOUNTER — TELEMEDICINE (OUTPATIENT)
Dept: FAMILY MEDICINE CLINIC | Facility: CLINIC | Age: 52
End: 2022-05-17
Payer: COMMERCIAL

## 2022-05-17 DIAGNOSIS — F41.9 ANXIETY: Primary | ICD-10-CM

## 2022-05-17 PROCEDURE — 99214 OFFICE O/P EST MOD 30 MIN: CPT | Performed by: FAMILY MEDICINE

## 2022-05-17 PROCEDURE — 3725F SCREEN DEPRESSION PERFORMED: CPT | Performed by: FAMILY MEDICINE

## 2022-05-17 RX ORDER — SERTRALINE HYDROCHLORIDE 25 MG/1
TABLET, FILM COATED ORAL
Qty: 60 TABLET | Refills: 1 | Status: SHIPPED | OUTPATIENT
Start: 2022-05-17 | End: 2022-05-18

## 2022-05-17 NOTE — PROGRESS NOTES
Virtual Brief Visit    Patient is located in the following state in which I hold an active license [place]      Assessment/Plan:  1  Anxiety  Begin taking zoloft 25 mg in the evening with supper X 2 weeks, and then increase to 50 mg with supper  Discussed possible adr's and to avoid abrupt discontinuation  Discussed onset of action as well    Pt is still seeing wellness counselor and a family intervention counselor that he has been seeing for a while  No longer taking trazodone - discourage from restarting due to possible adverse drug interaction with SSRI  Also - advise that pt avoid taking zoloft and all rx and otc meds with etoh - given increased risk for toxicities, metabolism changes,  Reviewed phq-9 of 8  And SHRADDHA - 7  Score of 6    shortterm f/u recommended - pt will reschedule appt from next week to 2-3 weeks out  But pt to message sooner with any questions/concerns      Note - discussed with pt to avoid getting meds prescribed by family member - even if physician - as they do not know pt's entire history and it could be dangerous  Pt aware  He previoulsy got trazodone rx by his sister      Problem List Items Addressed This Visit    None         Recent Visits  No visits were found meeting these conditions  Showing recent visits within past 7 days and meeting all other requirements  Today's Visits  Date Type Provider Dept   05/17/22 Telephone Donato Bray, [address] [facility]   05/17/22 [address] [facility], [place] [address] Primary Care   Showing today's visits and meeting all other requirements  Future Appointments  No visits were found meeting these conditions    Showing future appointments within next 150 days and meeting all other requirements     Pt presents today for a phone visit to discuss increased anxiety  Worse in the am upon awakening  And worsening in general - but has actually been a little better this week  Pt feels that it is mainly work related  Not occurring daily   H/o taking zoloft in the past  Pt does not recall if it worked for him but is willing to give another trial to med    Denies suicidal ideations    Pt was taking trazodone HS - rx by a family member   - and is no longer taking - as he is sleeping better in general       I spent 15 minutes directly with the patient during this visit

## 2022-05-17 NOTE — TELEPHONE ENCOUNTER
Tried calling pt to conduct virtual visit [phone] (note - appt was for 1440)  Pt was not connected via video and did not answer phone  Left message to call back within the next couple of min to conduct visit and if not able to do so then pt should reschedule appt       Pt did then call back around 1450 and telephone visit was conducted

## 2022-06-07 ENCOUNTER — OFFICE VISIT (OUTPATIENT)
Dept: FAMILY MEDICINE CLINIC | Facility: CLINIC | Age: 52
End: 2022-06-07
Payer: COMMERCIAL

## 2022-06-07 VITALS
BODY MASS INDEX: 27.97 KG/M2 | HEART RATE: 81 BPM | TEMPERATURE: 97 F | OXYGEN SATURATION: 97 % | HEIGHT: 70 IN | WEIGHT: 195.4 LBS | SYSTOLIC BLOOD PRESSURE: 118 MMHG | RESPIRATION RATE: 18 BRPM | DIASTOLIC BLOOD PRESSURE: 84 MMHG

## 2022-06-07 DIAGNOSIS — L82.1 SEBORRHEIC KERATOSES: Primary | ICD-10-CM

## 2022-06-07 DIAGNOSIS — F33.9 DEPRESSION, RECURRENT (HCC): ICD-10-CM

## 2022-06-07 PROCEDURE — 17110 DESTRUCTION B9 LES UP TO 14: CPT | Performed by: FAMILY MEDICINE

## 2022-06-07 PROCEDURE — 1036F TOBACCO NON-USER: CPT | Performed by: FAMILY MEDICINE

## 2022-06-07 PROCEDURE — 3008F BODY MASS INDEX DOCD: CPT | Performed by: FAMILY MEDICINE

## 2022-06-07 PROCEDURE — 99214 OFFICE O/P EST MOD 30 MIN: CPT | Performed by: FAMILY MEDICINE

## 2022-06-07 NOTE — PROGRESS NOTES
FAMILY PRACTICE OFFICE VISIT    NAME: Moreno Trent    AGE: 50 y o  SEX: male  : 1970   MRN: [id number]    DATE: 2022  TIME: 1:41 PM    Assessment and Plan   1  Seborrheic keratoses  Treated 2 hand lesions today with cryo  Discussed cool compresses for comfort  And call if any signs of infection  Nose lesion - consistent with benign macule  Pt to call with any changes and if so - refer to derm for bx/removal   No personal h/o skin cancer      2  Depression, recurrent ([facility] [address] )  Pt will be starting on zoloft    Pt reminded to get labs done  Patient to call for results if he/she does not hear from us  And return when due for routine visit          Chief Complaint     Chief Complaint   Patient presents with    Lesions     Lesions right hand top and left hand top        History of Present Illness   Melany Marinelli is a 50y o -year-old male who presents today for cryo to b/l hand lesions  Pt was concerned as his sister is a dr and recommended a visit      Review of Systems   Review of Systems   Constitutional: Negative for fever     Skin:        B/l hand lesions  Pt here for cryo  He thinks the one on right hand has gotten larger  No personal h/o skin cancer  None of the lesions are bleeding or itchy     Psychiatric/Behavioral:        Pt did not yet start zoloft as he was traveling but plans on starting         Active Problem List     Patient Active Problem List   Diagnosis    Allergic rhinitis    Muro's esophagus    Asthma, mild intermittent    Hematuria    Hiatal hernia    Hypercholesterolemia    Hypertension    Impaired fasting glucose    Sarcoidosis    Sleep apnea    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    Anxiety    Abnormal LFTs    Arm paresthesia, left    Alcohol screening    Overweight    Fatigue    Current mild episode of major depressive disorder (HCC)    Depression, recurrent ([facility] [address] )         Past Medical History:  Past Medical History: Diagnosis Date    Chest pain     Sarcoidosis        Past Surgical History:  Past Surgical History:   Procedure Laterality Date    BRONCHOSCOPY      COLONOSCOPY      complete     CYSTOSCOPY      Diagnostic - Neg     ESOPHAGOGASTRODUODENOSCOPY      Diagnostic        Family History:  Family History   Problem Relation Age of Onset    Colon cancer Mother         age in 35s   [place] Hyperlipidemia Mother     Hyperlipidemia Father     Pancreatic cancer Father         Adenocarcinoma of the ampulla of vater     Colon cancer Maternal Uncle     Colon cancer Other         maternal relatives - likely MGF and a second cousin in her 35s       Social History:  Social History     Socioeconomic History    Marital status:      Spouse name: Not on file    Number of children: Not on file    Years of education: Not on file    Highest education level: Not on file   Occupational History    Occupation:     Tobacco Use    Smoking status: Former Smoker     Types: Cigarettes     Quit date:      Years since quittin 4    Smokeless tobacco: Former User    Tobacco comment: smoked about 1 pack every 2 weeks for 2 years in his 19's    Vaping Use    Vaping Use: Never used   Substance and Sexual Activity    Alcohol use: Yes     Comment: reports down to 5 drinks once weekly    Drug use: No    Sexual activity: Not on file   Other Topics Concern    Not on file   Social History Narrative    Lives home with wife and daughter      Social Determinants of Health     Financial Resource Strain: Not on file   Food Insecurity: Not on file   Transportation Needs: Not on file   Physical Activity: Not on file   Stress: Not on file   Social Connections: Not on file   Intimate Partner Violence: Not on file   Housing Stability: Not on file       Objective     Vitals:    22 1333   BP: 118/84   Pulse: 81   Resp: 18   Temp: (!) 97 °F (36 1 °C)   SpO2: 97%     Wt Readings from Last 3 Encounters:   22 88 6 kg (195 lb 6 4 oz)   04/28/22 88 5 kg (195 lb)   01/05/22 88 9 kg (196 lb)       Physical Exam  Vitals and nursing note reviewed  Constitutional:       General: He is not in acute distress  Appearance: Normal appearance  He is not ill-appearing or toxic-appearing  HENT:      Head:        Comments: Right hand - slightly raised round lesion - located on dorsum - uniform in color and symmetric  Smooth borders - most consistent with inflamed seborrheic keratosis    Left hand - along base of thumb dorsum side - slightly raised and symmetric lesion - hyperpigmented and uniform in color; symmetric  Most consistent with seborrheic keratosis      Both lesions on hands - cryo X 45-60 sec   Verbal consent obtained and post-wound care instructions given  Call for any signs of infection  Skin:     Comments: See additonal documentation     Neurological:      Mental Status: He is alert  Psychiatric:         Mood and Affect: Mood normal          Behavior: Behavior normal          Thought Content: Thought content normal          Judgment: Judgment normal      Lesion Destruction    Date/Time: 6/7/2022 1:58 PM  Performed by: Shanel Jacinto DO  Authorized by: Shanel Jacinto DO   Universal Protocol:  Consent: Verbal consent obtained  Risks and benefits: risks, benefits and alternatives were discussed  Consent given by: patient  Time out: Immediately prior to procedure a "time out" was called to verify the correct patient, procedure, equipment, support staff and site/side marked as required    Timeout called at: 6/7/2022 1:58 PM   Patient understanding: patient states understanding of the procedure being performed  Patient consent: the patient's understanding of the procedure matches consent given  Procedure consent: procedure consent matches procedure scheduled  Test results: test results available and properly labeled  Patient identity confirmed: verbally with patient      Procedure Details - Lesion Destruction: Number of Lesions:  2  Lesion 1:     Body area:  Upper extremity    Upper extremity location: right hand  Malignancy: benign lesion      Destruction method: cryotherapy    Lesion 2:     Body area:  Upper extremity    Upper extremity location: left hand  Malignancy: benign lesion      Destruction method: cryotherapy       Areas cleaned and under sterile technique - cryo to both hand lesions X 45-60 sec  Pt tolerated procedure well  Post-wound care instructions given  Cool compresses for comfort  Call if any signs of infection  Call if lesion on nose changes/enlarges, - at which point - refer to derm  No procedure done on nasal lesion            procdoc  Pertinent Laboratory/Diagnostic Studies:  Lab Results   Component Value Date    GLUCOSE 99 07/25/2014    BUN 30 (H) 04/22/2022    CREATININE 1 42 (H) 04/22/2022    CALCIUM 10 0 04/22/2022     07/25/2014    K 3 2 (L) 04/22/2022    CO2 31 04/22/2022    CL 96 (L) 04/22/2022     Lab Results   Component Value Date     (H) 04/22/2022     (H) 04/22/2022    ALKPHOS 71 04/22/2022    BILITOT 0 66 07/25/2014       Lab Results   Component Value Date    WBC 8 01 10/14/2020    HGB 16 5 10/14/2020    HCT 47 5 10/14/2020    MCV 95 10/14/2020     10/14/2020       No results found for: TSH    Lab Results   Component Value Date    CHOL 239 07/25/2014     Lab Results   Component Value Date    TRIG 236 (H) 04/22/2022     Lab Results   Component Value Date    HDL 47 12/21/2021     Lab Results   Component Value Date    [facility]  12/21/2021      Comment:      Calculated LDL invalid, triglycerides >400 mg/dl  This screening LDL is a calculated result  It does not have the accuracy of the Direct Measured LDL in the monitoring of patients with hyperlipidemia and/or statin therapy  Direct Measure LDL (FMD674) must be ordered separately in these patients       Lab Results   Component Value Date    HGBA1C 5 0 06/26/2020       Results for orders placed or performed in visit on 04/22/22   Comprehensive metabolic panel   Result Value Ref Range    Sodium 138 136 - 145 mmol/L    Potassium 3 2 (L) 3 5 - 5 3 mmol/L    Chloride 96 (L) 100 - 108 mmol/L    CO2 31 21 - 32 mmol/L    ANION GAP 11 4 - 13 mmol/L    BUN 30 (H) 5 - 25 mg/dL    Creatinine 1 42 (H) 0 60 - 1 30 mg/dL    Glucose, Fasting 122 (H) 65 - 99 mg/dL    Calcium 10 0 8 3 - 10 1 mg/dL     (H) 5 - 45 U/L     (H) 12 - 78 U/L    Alkaline Phosphatase 71 46 - 116 U/L    Total Protein 8 5 (H) 6 4 - 8 2 g/dL    Albumin 3 9 3 5 - 5 0 g/dL    Total Bilirubin 1 15 (H) 0 20 - 1 00 mg/dL    eGFR 57 ml/min/1 73sq m   Lipase   Result Value Ref Range    Lipase 242 73 - 393 u/L   Triglycerides   Result Value Ref Range    Triglycerides 236 (H) See Comment mg/dL   Testosterone, free, total   Result Value Ref Range    Testosterone, Free 27 9 (H) 7 2 - 24 0 pg/mL    TESTOSTERONE TOTAL 438 264 - 916 ng/dL   CK (with reflex to MB)   Result Value Ref Range    Total CK 94 39 - 308 U/L       No orders of the defined types were placed in this encounter        ALLERGIES:  Allergies   Allergen Reactions    Other      seasonal       Current Medications     Current Outpatient Medications   Medication Sig Dispense Refill    albuterol (PROVENTIL HFA,VENTOLIN HFA) 90 mcg/act inhaler Inhale 2 puffs every 6 (six) hours as needed      amLODIPine (NORVASC) 10 mg tablet TAKE 1 TABLET BY MOUTH EVERY DAY 90 tablet 0    atenolol (TENORMIN) 100 mg tablet TAKE 1 TABLET BY MOUTH EVERY DAY 90 tablet 0    benazepril (LOTENSIN) 40 MG tablet TAKE 1 TABLET BY MOUTH ONCE DAILY 90 tablet 0    Blood Glucose Monitoring Suppl (FreeStyle Freedom Lite) w/Device KIT Use to test 2x daily, 2hours after a meal 1 each 1    esomeprazole (NexIUM) 40 MG capsule Take 40 mg by mouth 2 (two) times a day    0    fenofibrate (TRICOR) 48 mg tablet TAKE 1 TAB BY MOUTH EVERY OTHER DAY - IN THE AM 45 tablet 0    fluticasone (FLONASE) 50 mcg/act nasal spray 1 spray into each nostril daily as needed for rhinitis      glucose blood (FREESTYLE LITE) test strip USE AS INSTRUCTED WITH GLUCOMETER TO TEST BLOOD GLUCOSE 100 each 1    hydrochlorothiazide (HYDRODIURIL) 25 mg tablet Take 1 tablet (25 mg total) by mouth daily 90 tablet 2    Lancets (freestyle) lancets USE TO TEST BLOOD SUGARSS 2X DAILY 2 HOURS AFTER MEALS 100 each 0    Milk Thistle 250 MG CAPS Take 1 caplet by mouth daily      rosuvastatin (CRESTOR) 40 MG tablet Take 1 tablet (40 mg total) by mouth daily 90 tablet 2    sertraline (Zoloft) 25 mg tablet Take 1 tab po daily with supper X 2 weeks; then start the 50 mg tabs 14 tablet 0    sertraline (Zoloft) 50 mg tablet Take 1 tab po daily in the evening with supper (once finished with the 2 weeks on 25 mg tabs) 30 tablet 0    sildenafil (VIAGRA) 50 MG tablet Take 1 tablet (50 mg total) by mouth daily as needed for erectile dysfunction Take 30-60 min prior to sexual activity; do not take more than 1 tab in 24 hours  10 tablet 4     No current facility-administered medications for this visit           Health Maintenance     Health Maintenance   Topic Date Due    COVID-19 Vaccine (3 - Booster for Moderna series) 08/04/2021    Pneumococcal Vaccine: Pediatrics (0 to 5 Years) and At-Risk Patients (6 to 59 Years) (2 - PCV) 12/16/2021    BMI: Followup Plan  03/17/2022    Annual Physical  10/28/2022    Colorectal Cancer Screening  02/11/2023    BMI: Adult  04/28/2023    Depression Remission PHQ  05/17/2023    DTaP,Tdap,and Td Vaccines (3 - Td or Tdap) 01/11/2027    HIV Screening  Completed    Hepatitis C Screening  Completed    Influenza Vaccine  Completed    HIB Vaccine  Aged Out    Hepatitis B Vaccine  Aged Out    IPV Vaccine  Aged Out    Hepatitis A Vaccine  Aged Out    Meningococcal ACWY Vaccine  Aged Out    HPV Vaccine  Aged Out     Immunization History   Administered Date(s) Administered    COVID-19 MODERNA VACC 0 5 ML IM 02/04/2021, 03/04/2021  INFLUENZA 10/12/2018, 12/02/2021    Influenza Injectable, MDCK, Preservative Free, Quadrivalent, 0 5 mL 10/10/2020    Influenza Quadrivalent Preservative Free 3 years and older IM 10/01/2017    Influenza, recombinant, quadrivalent,injectable, preservative free 09/06/2019    Influenza, seasonal, injectable 01/02/2007, 11/08/2015    Pneumococcal Polysaccharide PPV23 12/16/2020    Tdap 11/30/2015, 01/11/2017            Radha Shepard, DO

## 2022-06-11 DIAGNOSIS — R73.9 HYPERGLYCEMIA: ICD-10-CM

## 2022-06-11 DIAGNOSIS — E88.09 HYPERPROTEINEMIA: ICD-10-CM

## 2022-06-11 DIAGNOSIS — E78.00 HYPERCHOLESTEROLEMIA: Primary | ICD-10-CM

## 2022-06-11 DIAGNOSIS — I10 HYPERTENSION, UNSPECIFIED TYPE: ICD-10-CM

## 2022-06-11 DIAGNOSIS — N28.9 RENAL INSUFFICIENCY: ICD-10-CM

## 2022-06-11 DIAGNOSIS — E78.1 HYPERTRIGLYCERIDEMIA: ICD-10-CM

## 2022-06-11 DIAGNOSIS — R79.89 ABNORMAL LFTS: ICD-10-CM

## 2022-06-11 RX ORDER — ATENOLOL 100 MG/1
TABLET ORAL
Qty: 90 TABLET | Refills: 0 | Status: SHIPPED | OUTPATIENT
Start: 2022-06-11

## 2022-06-12 RX ORDER — FENOFIBRATE 48 MG/1
TABLET, COATED ORAL
Qty: 45 TABLET | Refills: 0 | OUTPATIENT
Start: 2022-06-12

## 2022-06-12 NOTE — TELEPHONE ENCOUNTER
Please remind pt of need for repeat labs - I would like to have these done prior to refilling tricor - as he had some lab abnormalities (ie: kidney insufficiency and elevated lft's ) -  that I want to make sure resolve/improve prior to ongoing use of tricor  labslip is in chart  Should do bloodwork FASTING    Thanks

## 2022-06-13 DIAGNOSIS — F41.9 ANXIETY: ICD-10-CM

## 2022-06-13 NOTE — TELEPHONE ENCOUNTER
Called pt lmom advising him that Dr Celsa Marin  Would like for him to have blood work done before filling his medication to make sure things have improved/resoved  Once this is done she will fill his medication

## 2022-06-29 DIAGNOSIS — I10 ESSENTIAL HYPERTENSION: ICD-10-CM

## 2022-06-29 RX ORDER — AMLODIPINE BESYLATE 10 MG/1
TABLET ORAL
Qty: 90 TABLET | Refills: 0 | Status: SHIPPED | OUTPATIENT
Start: 2022-06-29

## 2022-07-15 DIAGNOSIS — E78.1 HYPERTRIGLYCERIDEMIA: ICD-10-CM

## 2022-07-15 RX ORDER — FENOFIBRATE 48 MG/1
TABLET, COATED ORAL
Qty: 45 TABLET | Refills: 0 | Status: SHIPPED | OUTPATIENT
Start: 2022-07-15

## 2022-07-22 ENCOUNTER — LAB (OUTPATIENT)
Dept: LAB | Facility: MEDICAL CENTER | Age: 52
End: 2022-07-22
Payer: COMMERCIAL

## 2022-07-22 DIAGNOSIS — E78.00 HYPERCHOLESTEROLEMIA: ICD-10-CM

## 2022-07-22 DIAGNOSIS — R73.9 HYPERGLYCEMIA: ICD-10-CM

## 2022-07-22 DIAGNOSIS — E88.09 HYPERPROTEINEMIA: ICD-10-CM

## 2022-07-22 DIAGNOSIS — N28.9 RENAL INSUFFICIENCY: ICD-10-CM

## 2022-07-22 DIAGNOSIS — E87.6 HYPOKALEMIA: ICD-10-CM

## 2022-07-22 DIAGNOSIS — I10 PRIMARY HYPERTENSION: ICD-10-CM

## 2022-07-22 LAB
ALBUMIN SERPL BCP-MCNC: 4 G/DL (ref 3.5–5)
ALP SERPL-CCNC: 50 U/L (ref 46–116)
ALT SERPL W P-5'-P-CCNC: 185 U/L (ref 12–78)
ANION GAP SERPL CALCULATED.3IONS-SCNC: 9 MMOL/L (ref 4–13)
AST SERPL W P-5'-P-CCNC: 126 U/L (ref 5–45)
BILIRUB SERPL-MCNC: 1.02 MG/DL (ref 0.2–1)
BUN SERPL-MCNC: 38 MG/DL (ref 5–25)
CALCIUM SERPL-MCNC: 10.5 MG/DL (ref 8.3–10.1)
CHLORIDE SERPL-SCNC: 98 MMOL/L (ref 96–108)
CO2 SERPL-SCNC: 28 MMOL/L (ref 21–32)
CREAT SERPL-MCNC: 1.82 MG/DL (ref 0.6–1.3)
EST. AVERAGE GLUCOSE BLD GHB EST-MCNC: 103 MG/DL
GFR SERPL CREATININE-BSD FRML MDRD: 42 ML/MIN/1.73SQ M
GLUCOSE SERPL-MCNC: 134 MG/DL (ref 65–140)
HBA1C MFR BLD: 5.2 %
POTASSIUM SERPL-SCNC: 3.5 MMOL/L (ref 3.5–5.3)
PROT SERPL-MCNC: 8.6 G/DL (ref 6.4–8.4)
SODIUM SERPL-SCNC: 135 MMOL/L (ref 135–147)

## 2022-07-22 PROCEDURE — 80053 COMPREHEN METABOLIC PANEL: CPT

## 2022-07-22 PROCEDURE — 84165 PROTEIN E-PHORESIS SERUM: CPT

## 2022-07-22 PROCEDURE — 83036 HEMOGLOBIN GLYCOSYLATED A1C: CPT

## 2022-07-22 PROCEDURE — 84165 PROTEIN E-PHORESIS SERUM: CPT | Performed by: PATHOLOGY

## 2022-07-22 PROCEDURE — 36415 COLL VENOUS BLD VENIPUNCTURE: CPT

## 2022-07-23 DIAGNOSIS — N28.9 RENAL INSUFFICIENCY: Primary | ICD-10-CM

## 2022-07-23 NOTE — RESULT ENCOUNTER NOTE
Hi krystin   There continues to be some decline in your kidney function  I would like you to stop taking the tricor  Will then repeat bmp (nonfasting blood test) in a week along with a cbc  I have also placed a referral for you to see a kidney specialist - please call their office to schedule an appointment  I would also like you to take only 1/2 of the hydrochlorothiazide tablet daily and monitor blood pressure  Please also schedule a followup appt with me to discuss rest of labs in detail - as there are several other abnormalities  Some labs are still pending as well  Recommend that you remain very well hydrated - aiming for 64 oz water/day

## 2022-07-25 LAB
ALBUMIN SERPL ELPH-MCNC: 4.96 G/DL (ref 3.5–5)
ALBUMIN SERPL ELPH-MCNC: 59.7 % (ref 52–65)
ALPHA1 GLOB SERPL ELPH-MCNC: 0.36 G/DL (ref 0.1–0.4)
ALPHA1 GLOB SERPL ELPH-MCNC: 4.3 % (ref 2.5–5)
ALPHA2 GLOB SERPL ELPH-MCNC: 0.83 G/DL (ref 0.4–1.2)
ALPHA2 GLOB SERPL ELPH-MCNC: 10 % (ref 7–13)
BETA GLOB ABNORMAL SERPL ELPH-MCNC: 0.51 G/DL (ref 0.4–0.8)
BETA1 GLOB SERPL ELPH-MCNC: 6.1 % (ref 5–13)
BETA2 GLOB SERPL ELPH-MCNC: 5.7 % (ref 2–8)
BETA2+GAMMA GLOB SERPL ELPH-MCNC: 0.47 G/DL (ref 0.2–0.5)
GAMMA GLOB ABNORMAL SERPL ELPH-MCNC: 1.18 G/DL (ref 0.5–1.6)
GAMMA GLOB SERPL ELPH-MCNC: 14.2 % (ref 12–22)
IGG/ALB SER: 1.48 {RATIO} (ref 1.1–1.8)
PROT PATTERN SERPL ELPH-IMP: ABNORMAL
PROT SERPL-MCNC: 8.3 G/DL (ref 6.4–8.2)

## 2022-07-25 NOTE — RESULT ENCOUNTER NOTE
Please see previous message re: labs and recommendations  As well as to schedule a followup appt  Thanks  Dr Tito Borges

## 2022-08-29 DIAGNOSIS — I10 ESSENTIAL HYPERTENSION: ICD-10-CM

## 2022-08-29 DIAGNOSIS — I10 HYPERTENSION, UNSPECIFIED TYPE: ICD-10-CM

## 2022-08-29 RX ORDER — ATENOLOL 100 MG/1
TABLET ORAL
Qty: 90 TABLET | Refills: 0 | Status: SHIPPED | OUTPATIENT
Start: 2022-08-29 | End: 2022-10-14

## 2022-08-29 RX ORDER — AMLODIPINE BESYLATE 10 MG/1
TABLET ORAL
Qty: 90 TABLET | Refills: 0 | Status: SHIPPED | OUTPATIENT
Start: 2022-08-29

## 2022-09-22 ENCOUNTER — LAB (OUTPATIENT)
Dept: LAB | Facility: MEDICAL CENTER | Age: 52
End: 2022-09-22
Payer: COMMERCIAL

## 2022-09-22 ENCOUNTER — OFFICE VISIT (OUTPATIENT)
Dept: FAMILY MEDICINE CLINIC | Facility: CLINIC | Age: 52
End: 2022-09-22
Payer: COMMERCIAL

## 2022-09-22 VITALS
DIASTOLIC BLOOD PRESSURE: 80 MMHG | WEIGHT: 188.6 LBS | HEART RATE: 76 BPM | HEIGHT: 70 IN | RESPIRATION RATE: 18 BRPM | TEMPERATURE: 97.1 F | BODY MASS INDEX: 27 KG/M2 | SYSTOLIC BLOOD PRESSURE: 104 MMHG | OXYGEN SATURATION: 96 %

## 2022-09-22 DIAGNOSIS — E83.52 HYPERCALCEMIA: ICD-10-CM

## 2022-09-22 DIAGNOSIS — Z23 NEED FOR INFLUENZA VACCINATION: ICD-10-CM

## 2022-09-22 DIAGNOSIS — N28.9 RENAL INSUFFICIENCY: ICD-10-CM

## 2022-09-22 DIAGNOSIS — R79.89 ELEVATED LIVER FUNCTION TESTS: Primary | ICD-10-CM

## 2022-09-22 DIAGNOSIS — E80.6 HYPERBILIRUBINEMIA: ICD-10-CM

## 2022-09-22 LAB
25(OH)D3 SERPL-MCNC: 13 NG/ML (ref 30–100)
ANION GAP SERPL CALCULATED.3IONS-SCNC: 11 MMOL/L (ref 4–13)
BACTERIA UR QL AUTO: ABNORMAL /HPF
BASOPHILS # BLD AUTO: 0.07 THOUSANDS/ΜL (ref 0–0.1)
BASOPHILS NFR BLD AUTO: 1 % (ref 0–1)
BILIRUB UR QL STRIP: ABNORMAL
BUN SERPL-MCNC: 28 MG/DL (ref 5–25)
CA-I BLD-SCNC: 1.16 MMOL/L (ref 1.12–1.32)
CALCIUM SERPL-MCNC: 10.3 MG/DL (ref 8.3–10.1)
CHLORIDE SERPL-SCNC: 92 MMOL/L (ref 96–108)
CLARITY UR: ABNORMAL
CO2 SERPL-SCNC: 28 MMOL/L (ref 21–32)
COLOR UR: ABNORMAL
CREAT SERPL-MCNC: 1.29 MG/DL (ref 0.6–1.3)
EOSINOPHIL # BLD AUTO: 0.21 THOUSAND/ΜL (ref 0–0.61)
EOSINOPHIL NFR BLD AUTO: 3 % (ref 0–6)
ERYTHROCYTE [DISTWIDTH] IN BLOOD BY AUTOMATED COUNT: 12.6 % (ref 11.6–15.1)
GFR SERPL CREATININE-BSD FRML MDRD: 64 ML/MIN/1.73SQ M
GLUCOSE P FAST SERPL-MCNC: 116 MG/DL (ref 65–99)
GLUCOSE UR STRIP-MCNC: NEGATIVE MG/DL
GRAN CASTS #/AREA URNS LPF: ABNORMAL /[LPF]
HCT VFR BLD AUTO: 46 % (ref 36.5–49.3)
HGB BLD-MCNC: 15.7 G/DL (ref 12–17)
HGB UR QL STRIP.AUTO: NEGATIVE
HYALINE CASTS #/AREA URNS LPF: ABNORMAL /LPF
IMM GRANULOCYTES # BLD AUTO: 0.06 THOUSAND/UL (ref 0–0.2)
IMM GRANULOCYTES NFR BLD AUTO: 1 % (ref 0–2)
KETONES UR STRIP-MCNC: ABNORMAL MG/DL
LEUKOCYTE ESTERASE UR QL STRIP: NEGATIVE
LYMPHOCYTES # BLD AUTO: 1.41 THOUSANDS/ΜL (ref 0.6–4.47)
LYMPHOCYTES NFR BLD AUTO: 18 % (ref 14–44)
MCH RBC QN AUTO: 32.4 PG (ref 26.8–34.3)
MCHC RBC AUTO-ENTMCNC: 34.1 G/DL (ref 31.4–37.4)
MCV RBC AUTO: 95 FL (ref 82–98)
MONOCYTES # BLD AUTO: 0.91 THOUSAND/ΜL (ref 0.17–1.22)
MONOCYTES NFR BLD AUTO: 11 % (ref 4–12)
MUCOUS THREADS UR QL AUTO: ABNORMAL
NEUTROPHILS # BLD AUTO: 5.35 THOUSANDS/ΜL (ref 1.85–7.62)
NEUTS SEG NFR BLD AUTO: 66 % (ref 43–75)
NITRITE UR QL STRIP: NEGATIVE
NON-SQ EPI CELLS URNS QL MICRO: ABNORMAL /HPF
NRBC BLD AUTO-RTO: 0 /100 WBCS
PH UR STRIP.AUTO: 5.5 [PH]
PHOSPHATE SERPL-MCNC: 3 MG/DL (ref 2.7–4.5)
PLATELET # BLD AUTO: 209 THOUSANDS/UL (ref 149–390)
PMV BLD AUTO: 11 FL (ref 8.9–12.7)
POTASSIUM SERPL-SCNC: 3.8 MMOL/L (ref 3.5–5.3)
PROT UR STRIP-MCNC: ABNORMAL MG/DL
PTH-INTACT SERPL-MCNC: 36.1 PG/ML (ref 18.4–80.1)
RBC # BLD AUTO: 4.85 MILLION/UL (ref 3.88–5.62)
RBC #/AREA URNS AUTO: ABNORMAL /HPF
SODIUM SERPL-SCNC: 131 MMOL/L (ref 135–147)
SP GR UR STRIP.AUTO: 1.03 (ref 1–1.03)
UROBILINOGEN UR STRIP-ACNC: 4 MG/DL
WBC # BLD AUTO: 8.01 THOUSAND/UL (ref 4.31–10.16)
WBC #/AREA URNS AUTO: ABNORMAL /HPF

## 2022-09-22 PROCEDURE — 82306 VITAMIN D 25 HYDROXY: CPT

## 2022-09-22 PROCEDURE — 80048 BASIC METABOLIC PNL TOTAL CA: CPT

## 2022-09-22 PROCEDURE — 83970 ASSAY OF PARATHORMONE: CPT

## 2022-09-22 PROCEDURE — 90471 IMMUNIZATION ADMIN: CPT

## 2022-09-22 PROCEDURE — 85025 COMPLETE CBC W/AUTO DIFF WBC: CPT

## 2022-09-22 PROCEDURE — 81001 URINALYSIS AUTO W/SCOPE: CPT | Performed by: FAMILY MEDICINE

## 2022-09-22 PROCEDURE — 90682 RIV4 VACC RECOMBINANT DNA IM: CPT

## 2022-09-22 PROCEDURE — 87086 URINE CULTURE/COLONY COUNT: CPT | Performed by: FAMILY MEDICINE

## 2022-09-22 PROCEDURE — 36415 COLL VENOUS BLD VENIPUNCTURE: CPT

## 2022-09-22 PROCEDURE — 84100 ASSAY OF PHOSPHORUS: CPT

## 2022-09-22 PROCEDURE — 82330 ASSAY OF CALCIUM: CPT

## 2022-09-22 PROCEDURE — 99214 OFFICE O/P EST MOD 30 MIN: CPT | Performed by: FAMILY MEDICINE

## 2022-09-22 NOTE — PROGRESS NOTES
FAMILY PRACTICE OFFICE VISIT    NAME: Moreno Trent    AGE: 50 y o  SEX: male  : 1970   MRN: [id number]    DATE: 2022  TIME: 9:35 AM    Assessment and Plan    1  Need for influenza vaccination    - influenza vaccine, quadrivalent, recombinant, PF, 0 5 mL, for patients 18 yr+ (FLUBLOK)    2  Elevated liver function tests  Had US abdomen 2022 -   IMPRESSION:     Hepatosplenomegaly      Hepatic moderate steatosis    Await SPEP  If abnormal along with repeat  Labs - to consider hem/onc  F/u with gi when due - pt with known h/o barretts   Will need frequent egd's    Urge complete etoh cessation      3  Hyperbilirubinemia  Repeat ordered  4  Hypercalcemia  Check additional labs  May need endo pending results  5  Renal insufficiency  Referral to nephro  Med changes pending results of labs today    May need to decrease or stop hctz  May need to decrease or stop ace  And may n eed to HOLD tricor  There are no Patient Instructions on file for this visit  URGE COMPLIANCE IN READING RESULTS THRU MYCHART  AND RESPONDING  Patient Instructions   Labs and urine today  Await notification thru mychart with further instructions pending results    Return in 3 mos  Keep hydrated  And may need to make med changes pending labs          Chief Complaint     Chief Complaint   Patient presents with    Follow-up       History of Present Illness   Dana Jiang is a 50y o -year-old male who presents today for followup visit  I have been trying to reach pt re: abnormal labs  Recommend med adjustments and need to see nephro  Pt has not been great at checking mychart  And only got the message from his sister yesterday (who checks his chart) - and did not make any med changes as of yet  Pt also has not gone for labs  Has slip today to get cmp, cbc, SPEP and hba1c      Pt reports to being busy with work  Denies worsening depression  Travels to [place] one week/mo over the next 7-8 mos  And [facility]"  Stress with job        Review of Systems   Review of Systems   Constitutional:        Lost 7 #  Walking  Going to gym again     Respiratory: Positive for apnea  Negative for cough, shortness of breath and wheezing  Has not been using cpap  But thinks this could help his sleep  Cardiovascular: Negative for chest pain and palpitations  Gastrointestinal: Negative for abdominal pain, blood in stool, constipation, diarrhea, nausea and vomiting  No changes in bowels  Genitourinary: Negative for hematuria  Urine may be darker in color at times  Psychiatric/Behavioral: Positive for sleep disturbance  Negative for self-injury and suicidal ideas  Pt has not been taking zoloft regularly  Is still on 25 mg daily  He was then to go to 50 mg daily  Sleep is off an on  Relationship with dtr's is not great  Pt is   Was in counseling with dtr's   But pt considering restarting with youngest dtr  Sees wellness counselor q 3-4 weeks  Pt feels that they help him           Active Problem List     Patient Active Problem List   Diagnosis    Allergic rhinitis    Muro's esophagus    Asthma, mild intermittent    Hematuria    Hiatal hernia    Hypercholesterolemia    Hypertension    Impaired fasting glucose    Sarcoidosis    Sleep apnea    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    Anxiety    Abnormal LFTs    Arm paresthesia, left    Alcohol screening    Overweight    Fatigue    Current mild episode of major depressive disorder (HCC)    Depression, recurrent ([facility] [address] )         Past Medical History:  Past Medical History:   Diagnosis Date    Chest pain     Sarcoidosis        Past Surgical History:  Past Surgical History:   Procedure Laterality Date    BRONCHOSCOPY      COLONOSCOPY      complete     CYSTOSCOPY      Diagnostic - Neg 2007    ESOPHAGOGASTRODUODENOSCOPY      Diagnostic        Family History:  Family History   Problem Relation Age of Onset    Colon cancer Mother         age in 29s    Hyperlipidemia Mother     Hyperlipidemia Father     Pancreatic cancer Father         Adenocarcinoma of the ampulla of vater     Colon cancer Maternal Uncle     Colon cancer Other         maternal relatives - likely MGF and a second cousin in her 35s       Social History:  Social History     Socioeconomic History    Marital status:      Spouse name: Not on file    Number of children: Not on file    Years of education: Not on file    Highest education level: Not on file   Occupational History    Occupation:     Tobacco Use    Smoking status: Former Smoker     Types: Cigarettes     Quit date:      Years since quittin 7    Smokeless tobacco: Former User    Tobacco comment: smoked about 1 pack every 2 weeks for 2 years in his 19's    Vaping Use    Vaping Use: Never used   Substance and Sexual Activity    Alcohol use: Yes     Comment: reports down to 5 drinks once weekly    Drug use: No    Sexual activity: Not on file   Other Topics Concern    Not on file   Social History Narrative    Lives home with wife and daughter      Social Determinants of Health     Financial Resource Strain: Not on file   Food Insecurity: Not on file   Transportation Needs: Not on file   Physical Activity: Not on file   Stress: Not on file   Social Connections: Not on file   Intimate Partner Violence: Not on file   Housing Stability: Not on file       Objective     Vitals:    22 0924   BP: 104/80   Pulse: 76   Resp: 18   Temp: (!) 97 1 °F (36 2 °C)   SpO2: 96%     Wt Readings from Last 3 Encounters:   22 85 5 kg (188 lb 9 6 oz)   22 88 6 kg (195 lb 6 4 oz)   22 88 5 kg (195 lb)       Physical Exam  Vitals and nursing note reviewed  Constitutional:       General: He is not in acute distress  Appearance: Normal appearance  He is not toxic-appearing or diaphoretic     Eyes: General: No scleral icterus  Cardiovascular:      Rate and Rhythm: Normal rate and regular rhythm  Pulses: Normal pulses  Heart sounds: Normal heart sounds  No murmur heard  Pulmonary:      Effort: Pulmonary effort is normal  No respiratory distress  Breath sounds: Normal breath sounds  No wheezing, rhonchi or rales  Abdominal:      General: There is no distension  Palpations: Abdomen is soft  There is no mass  Tenderness: There is no abdominal tenderness  There is no guarding or rebound  Musculoskeletal:      Right lower leg: No edema  Left lower leg: No edema  Skin:     Coloration: Skin is not jaundiced  Neurological:      General: No focal deficit present  Mental Status: He is alert and oriented to person, place, and time  Psychiatric:         Mood and Affect: Mood normal          Behavior: Behavior normal          Thought Content:  Thought content normal          Judgment: Judgment normal       Comments: MOOD SOMEWHAT FLAT BUT UNCHANGED FROM PRIOR         Pertinent Laboratory/Diagnostic Studies:  Lab Results   Component Value Date    GLUCOSE 99 07/25/2014    BUN 38 (H) 07/22/2022    CREATININE 1 82 (H) 07/22/2022    CALCIUM 10 5 (H) 07/22/2022     07/25/2014    K 3 5 07/22/2022    CO2 28 07/22/2022    CL 98 07/22/2022     Lab Results   Component Value Date     (H) 07/22/2022     (H) 07/22/2022    ALKPHOS 50 07/22/2022    BILITOT 0 66 07/25/2014       Lab Results   Component Value Date    WBC 8 01 10/14/2020    HGB 16 5 10/14/2020    HCT 47 5 10/14/2020    MCV 95 10/14/2020     10/14/2020       No results found for: TSH    Lab Results   Component Value Date    CHOL 239 07/25/2014     Lab Results   Component Value Date    TRIG 236 (H) 04/22/2022     Lab Results   Component Value Date    HDL 47 12/21/2021     Lab Results   Component Value Date    LDLCALC  12/21/2021      Comment:      Calculated LDL invalid, triglycerides >400 mg/dl  This screening LDL is a calculated result  It does not have the accuracy of the Direct Measured LDL in the monitoring of patients with hyperlipidemia and/or statin therapy  Direct Measure LDL (BAS224) must be ordered separately in these patients  Lab Results   Component Value Date    HGBA1C 5 2 07/22/2022       Results for orders placed or performed in visit on 07/22/22   Comprehensive metabolic panel   Result Value Ref Range    Sodium 135 135 - 147 mmol/L    Potassium 3 5 3 5 - 5 3 mmol/L    Chloride 98 96 - 108 mmol/L    CO2 28 21 - 32 mmol/L    ANION GAP 9 4 - 13 mmol/L    BUN 38 (H) 5 - 25 mg/dL    Creatinine 1 82 (H) 0 60 - 1 30 mg/dL    Glucose 134 65 - 140 mg/dL    Calcium 10 5 (H) 8 3 - 10 1 mg/dL     (H) 5 - 45 U/L     (H) 12 - 78 U/L    Alkaline Phosphatase 50 46 - 116 U/L    Total Protein 8 6 (H) 6 4 - 8 4 g/dL    Albumin 4 0 3 5 - 5 0 g/dL    Total Bilirubin 1 02 (H) 0 20 - 1 00 mg/dL    eGFR 42 ml/min/1 73sq m   Protein electrophoresis, serum   Result Value Ref Range    A/G Ratio 1 48 1 10 - 1 80    Albumin Electrophoresis 59 7 52 0 - 65 0 %    Albumin CONC 4 96 3 50 - 5 00 g/dl    Alpha 1 4 3 2 5 - 5 0 %    ALPHA 1 CONC 0 36 0 10 - 0 40 g/dL    Alpha 2 10 0 7 0 - 13 0 %    ALPHA 2 CONC 0 83 0 40 - 1 20 g/dL    Beta-1 6 1 5 0 - 13 0 %    BETA 1 CONC 0 51 0 40 - 0 80 g/dL    Beta-2 5 7 2 0 - 8 0 %    BETA 2 CONC 0 47 0 20 - 0 50 g/dL    Gamma Globulin 14 2 12 0 - 22 0 %    GAMMA CONC 1 18 0 50 - 1 60 g/dL    Total Protein 8 3 (H) 6 4 - 8 2 g/dL    SPEP Interpretation See Comment    HEMOGLOBIN A1C W/ EAG ESTIMATION   Result Value Ref Range    Hemoglobin A1C 5 2 Normal 3 8-5 6%; PreDiabetic 5 7-6 4%;  Diabetic >=6 5%; Glycemic control for adults with diabetes <7 0% %     mg/dl       Orders Placed This Encounter   Procedures    influenza vaccine, quadrivalent, recombinant, PF, 0 5 mL, for patients 18 yr+ (FLUBLOK)       ALLERGIES:  Allergies   Allergen Reactions    Other seasonal       Current Medications     Current Outpatient Medications   Medication Sig Dispense Refill    albuterol (PROVENTIL HFA,VENTOLIN HFA) 90 mcg/act inhaler Inhale 2 puffs every 6 (six) hours as needed      amLODIPine (NORVASC) 10 mg tablet TAKE 1 TABLET BY MOUTH EVERY DAY 90 tablet 0    atenolol (TENORMIN) 100 mg tablet TAKE 1 TABLET BY MOUTH EVERY DAY 90 tablet 0    benazepril (LOTENSIN) 40 MG tablet TAKE 1 TABLET BY MOUTH EVERY DAY 30 tablet 0    Blood Glucose Monitoring Suppl (FreeStyle Freedom Lite) w/Device KIT Use to test 2x daily, 2hours after a meal 1 each 1    esomeprazole (NexIUM) 40 MG capsule Take 40 mg by mouth 2 (two) times a day    0    fenofibrate (TRICOR) 48 mg tablet TAKE 1 TAB BY MOUTH EVERY OTHER DAY - IN THE AM 45 tablet 0    fluticasone (FLONASE) 50 mcg/act nasal spray 1 spray into each nostril daily as needed for rhinitis      glucose blood (FREESTYLE LITE) test strip USE AS INSTRUCTED WITH GLUCOMETER TO TEST BLOOD GLUCOSE 100 each 1    hydrochlorothiazide (HYDRODIURIL) 25 mg tablet TAKE 1/2 TAB (OR 12 5 MG ) BY MOUTH DAILY DOSE DECREASED AS OF 8/9/2022 15 tablet 0    Lancets (freestyle) lancets USE TO TEST BLOOD SUGARSS 2X DAILY 2 HOURS AFTER MEALS 100 each 0    Milk Thistle 250 MG CAPS Take 1 caplet by mouth daily      rosuvastatin (CRESTOR) 40 MG tablet TAKE 1 TABLET BY MOUTH EVERY DAY 90 tablet 2    sertraline (Zoloft) 25 mg tablet Take 1 tab po daily with supper X 2 weeks; then start the 50 mg tabs 14 tablet 0    sertraline (ZOLOFT) 50 mg tablet TAKE 1 TAB BY MOUTH DAILY IN THE EVENING WITH SUPPER (ONCE FINISHED WITH THE 2 WEEKS ON 25 MG TABS) 90 tablet 1    sildenafil (VIAGRA) 50 MG tablet Take 1 tablet (50 mg total) by mouth daily as needed for erectile dysfunction Take 30-60 min prior to sexual activity; do not take more than 1 tab in 24 hours  10 tablet 4     No current facility-administered medications for this visit           [facility] Maintenance   Topic Date Due    COVID-19 Vaccine (3 - Booster for Moderna series) 08/04/2021    Pneumococcal Vaccine: Pediatrics (0 to 5 Years) and At-Risk Patients (6 to 59 Years) (2 - PCV) 12/16/2021    BMI: Followup Plan  03/17/2022    Influenza Vaccine (1) 09/01/2022    Annual Physical  10/28/2022    Colorectal Cancer Screening  02/11/2023    Depression Remission PHQ  05/17/2023    BMI: Adult  09/22/2023    DTaP,Tdap,and Td Vaccines (3 - Td or Tdap) 01/11/2027    HIV Screening  Completed    Hepatitis C Screening  Completed    HIB Vaccine  Aged Out    Hepatitis B Vaccine  Aged Out    IPV Vaccine  Aged Out    Hepatitis A Vaccine  Aged Out    Meningococcal ACWY Vaccine  Aged Out    HPV Vaccine  Aged Out     Immunization History   Administered Date(s) Administered    COVID-19 MODERNA VACC 0 5 ML IM 02/04/2021, 03/04/2021    INFLUENZA 10/12/2018, 12/02/2021    Influenza Injectable, MDCK, Preservative Free, Quadrivalent, 0 5 mL 10/10/2020    Influenza Quadrivalent Preservative Free 3 years and older IM 10/01/2017    Influenza, recombinant, quadrivalent,injectable, preservative free 09/06/2019    Influenza, seasonal, injectable 01/02/2007, 11/08/2015    Pneumococcal Polysaccharide PPV23 12/16/2020    Tdap 11/30/2015, 01/11/2017          Penny Amaro DO

## 2022-09-22 NOTE — PATIENT INSTRUCTIONS
Labs and urine today  Await notification thru mychart with further instructions pending results    Return in 3 mos  Keep hydrated  And may need to make med changes pending labs

## 2022-09-23 LAB — BACTERIA UR CULT: NORMAL

## 2022-09-24 DIAGNOSIS — E55.9 VITAMIN D DEFICIENCY: Primary | ICD-10-CM

## 2022-09-24 RX ORDER — ERGOCALCIFEROL 1.25 MG/1
50000 CAPSULE ORAL WEEKLY
Qty: 8 CAPSULE | Refills: 0 | Status: SHIPPED | OUTPATIENT
Start: 2022-09-24 | End: 2023-01-03

## 2022-09-24 NOTE — RESULT ENCOUNTER NOTE
Good morning -   Your labs show the following abnormalities:  1  Low vit D   May be related to underlying kidney problems  2  Sodium is low; glucose is consistent with pre-diabetes  3  Creatinine has improved - but your urine shows a lot of protein  As well as ketones  I am concerned that you are not eating well  I also strongly recommend not drinking any alcohol - as this can create problems with liver and kidneys  4  Given abnormal urine, previously abnormal kidney labs - I would like you to see a nephrologist  There is a referral already in chart  5  Given the elevated liver functions and bilirubin in both blood and urine - I would also like you to be seen by GI  If you do not already have a GI dr - let me know and I will place a referral   It is important to follow thru with these recommendations; please respond to message so that I know you received it  NOTE - I SENT IN RX FOR VIT D - BUT THEN CANCELLED IT - SO PLEASE DO NOT   Thanks!   Dr Oswaldo Fofana

## 2022-10-09 DIAGNOSIS — I10 ESSENTIAL HYPERTENSION: ICD-10-CM

## 2022-10-09 RX ORDER — HYDROCHLOROTHIAZIDE 25 MG/1
TABLET ORAL
Qty: 45 TABLET | Refills: 1 | Status: SHIPPED | OUTPATIENT
Start: 2022-10-09

## 2022-10-09 RX ORDER — BENAZEPRIL HYDROCHLORIDE 40 MG/1
TABLET, FILM COATED ORAL
Qty: 90 TABLET | Refills: 1 | Status: SHIPPED | OUTPATIENT
Start: 2022-10-09

## 2022-10-14 DIAGNOSIS — I10 HYPERTENSION, UNSPECIFIED TYPE: ICD-10-CM

## 2022-10-14 DIAGNOSIS — F41.9 ANXIETY: ICD-10-CM

## 2022-10-14 RX ORDER — ATENOLOL 100 MG/1
TABLET ORAL
Qty: 90 TABLET | Refills: 0 | Status: SHIPPED | OUTPATIENT
Start: 2022-10-14

## 2022-10-27 ENCOUNTER — TELEPHONE (OUTPATIENT)
Dept: NEPHROLOGY | Facility: CLINIC | Age: 52
End: 2022-10-27

## 2022-10-27 NOTE — TELEPHONE ENCOUNTER
Patient left a voicemail that he would like a return call to schedule a consultation - please call him back at [phone] to schedule

## 2022-10-28 ENCOUNTER — TELEPHONE (OUTPATIENT)
Dept: NEPHROLOGY | Facility: CLINIC | Age: 52
End: 2022-10-28

## 2022-10-28 NOTE — TELEPHONE ENCOUNTER
Call from JYOTHI stating pt had not been called  After looking at the referral- it was put as outgoing- not internal so it did not fall into our queue   Lm to call for consult   This is the 1st attempt

## 2022-11-09 ENCOUNTER — TELEPHONE (OUTPATIENT)
Dept: NEPHROLOGY | Facility: CLINIC | Age: 52
End: 2022-11-09

## 2022-11-10 ENCOUNTER — TELEPHONE (OUTPATIENT)
Dept: NEPHROLOGY | Facility: CLINIC | Age: 52
End: 2022-11-10

## 2022-11-10 NOTE — TELEPHONE ENCOUNTER
cNew Patient Intake Form   Patient Details   Clint Garcia     1970     [id number]     Insurance Information   Name of [address] CBC    Does the patient need an insurance referral? no   If patient has [employer], please ask if they will be using their [employer]  Appointment Information   Who is calling to schedule? If not patient, what is callers name? Patient   Referring Provider Dr Zully Rose    Referring Provider Number [phone]    Reason for Appt (Diagnosis) N28 9 (ICD-10-CM) - Renal insufficiency    Is patient aware of why they are being referred? yes   Does Patient have labs done at [facility]? If not, where do they go?  yes   Has patient had labs / urine work done? List date of most recent lab / urine work  yes  9/22   Has patient had a BMP & CBC done in the past 2 years? If so, list the date Yes  9/22   Has patient been hospitalized recently? If yes, list name and location of hospital they were in  no   Has patient been seen by a Nephrologist before? If yes, list name, location and phone number no    Has patient been see by another Speciality before (ex  Neurology, urology, cardiology)? If yes, please list name, and speciality  Pulm- [facility]  [address]   Has the patient had imaging done? If so, list the most recent date and type of imaging Yes  US 4/22   Does the patient has a stone analysis report if history of kidney stones? no    Appointment Details   Is there a referral on file?  yes    Appointment Date  2/16   Location [place]   Miscellaneous

## 2022-12-06 DIAGNOSIS — R79.89 ABNORMAL LFTS: ICD-10-CM

## 2022-12-06 DIAGNOSIS — R73.01 IMPAIRED FASTING GLUCOSE: ICD-10-CM

## 2022-12-06 DIAGNOSIS — E78.00 HYPERCHOLESTEROLEMIA: ICD-10-CM

## 2022-12-06 DIAGNOSIS — I10 ESSENTIAL HYPERTENSION: ICD-10-CM

## 2022-12-06 RX ORDER — SILDENAFIL 50 MG/1
TABLET, FILM COATED ORAL
Qty: 8 TABLET | Refills: 6 | Status: SHIPPED | OUTPATIENT
Start: 2022-12-06

## 2022-12-08 ENCOUNTER — OFFICE VISIT (OUTPATIENT)
Dept: FAMILY MEDICINE CLINIC | Facility: CLINIC | Age: 52
End: 2022-12-08

## 2022-12-08 VITALS
RESPIRATION RATE: 20 BRPM | DIASTOLIC BLOOD PRESSURE: 74 MMHG | TEMPERATURE: 98.3 F | HEIGHT: 70 IN | BODY MASS INDEX: 27.54 KG/M2 | WEIGHT: 192.4 LBS | SYSTOLIC BLOOD PRESSURE: 120 MMHG | OXYGEN SATURATION: 95 % | HEART RATE: 80 BPM

## 2022-12-08 DIAGNOSIS — I10 PRIMARY HYPERTENSION: ICD-10-CM

## 2022-12-08 DIAGNOSIS — J30.1 ALLERGIC RHINITIS DUE TO POLLEN, UNSPECIFIED SEASONALITY: ICD-10-CM

## 2022-12-08 DIAGNOSIS — R73.01 IMPAIRED FASTING GLUCOSE: ICD-10-CM

## 2022-12-08 DIAGNOSIS — R82.90 ABNORMAL URINE: ICD-10-CM

## 2022-12-08 DIAGNOSIS — J45.20 MILD INTERMITTENT ASTHMA WITHOUT COMPLICATION: ICD-10-CM

## 2022-12-08 DIAGNOSIS — Z00.00 PHYSICAL EXAM: ICD-10-CM

## 2022-12-08 DIAGNOSIS — Z23 ENCOUNTER FOR IMMUNIZATION: ICD-10-CM

## 2022-12-08 DIAGNOSIS — R79.89 ELEVATED LIVER FUNCTION TESTS: ICD-10-CM

## 2022-12-08 DIAGNOSIS — Z23 NEED FOR SHINGLES VACCINE: Primary | ICD-10-CM

## 2022-12-08 DIAGNOSIS — Z12.11 COLON CANCER SCREENING: ICD-10-CM

## 2022-12-08 DIAGNOSIS — G47.33 OBSTRUCTIVE SLEEP APNEA SYNDROME: ICD-10-CM

## 2022-12-08 DIAGNOSIS — N28.9 RENAL INSUFFICIENCY: ICD-10-CM

## 2022-12-08 DIAGNOSIS — E80.6 HYPERBILIRUBINEMIA: ICD-10-CM

## 2022-12-08 DIAGNOSIS — E78.00 HYPERCHOLESTEROLEMIA: ICD-10-CM

## 2022-12-08 DIAGNOSIS — I10 ESSENTIAL HYPERTENSION: ICD-10-CM

## 2022-12-08 DIAGNOSIS — Z00.00 ANNUAL PHYSICAL EXAM: ICD-10-CM

## 2022-12-08 DIAGNOSIS — K22.70 BARRETT'S ESOPHAGUS WITHOUT DYSPLASIA: ICD-10-CM

## 2022-12-08 DIAGNOSIS — E83.52 HYPERCALCEMIA: ICD-10-CM

## 2022-12-08 DIAGNOSIS — D86.9 SARCOIDOSIS: ICD-10-CM

## 2022-12-08 DIAGNOSIS — F41.9 ANXIETY: ICD-10-CM

## 2022-12-08 PROBLEM — E88.09 HYPERPROTEINEMIA: Status: ACTIVE | Noted: 2022-12-08

## 2022-12-08 PROBLEM — Z80.0 FAMILY HISTORY OF LYNCH SYNDROME: Status: ACTIVE | Noted: 2022-12-08

## 2022-12-08 RX ORDER — HYDROCHLOROTHIAZIDE 25 MG/1
TABLET ORAL
Qty: 45 TABLET | Refills: 1 | Status: CANCELLED | OUTPATIENT
Start: 2022-12-08

## 2022-12-08 NOTE — PATIENT INSTRUCTIONS
Get fasting labs tomorrow am  And then pending results - will determine refills on hctz and tricor - may need to adjust dosing based on kidney function    Also - call renal dr to see if you can be put on cancellation list     Schedule with GI for egd and colonoscopy    Consider restarting zoloft    Today you got prevnar 20 and shingrix vaccines      Wellness Visit for Adults   AMBULATORY CARE:   A wellness visit  is when you see your healthcare provider to get screened for health problems  Your healthcare provider will also give you advice on how to stay healthy  Write down your questions so you remember to ask them  Ask your healthcare provider how often you should have a wellness visit  What happens at a wellness visit:  Your healthcare provider will ask about your health, and your family history of health problems  This includes high blood pressure, heart disease, and cancer  He or she will ask if you have symptoms that concern you, if you smoke, and about your mood  You may also be asked about your intake of medicines, supplements, food, and alcohol  Any of the following may be done:  · Your weight  will be checked  Your height may also be checked so your body mass index (BMI) can be calculated  Your BMI shows if you are at a healthy weight  · Your blood pressure  and heart rate will be checked  Your temperature may also be checked  · Blood and urine tests  may be done  Blood tests may be done to check your cholesterol levels  Abnormal cholesterol levels increase your risk for heart disease and stroke  You may also need a blood or urine test to check for diabetes if you are at increased risk  Urine tests may be done to look for signs of an infection or kidney disease  · A physical exam  includes checking your heartbeat and lungs with a stethoscope  Your healthcare provider may also check your skin to look for sun damage  · Screening tests  may be recommended   A screening test is done to check for diseases that may not cause symptoms  The screening tests you may need depend on your age, gender, family history, and lifestyle habits  For example, colorectal screening may be recommended if you are 48years old or older  Screening tests you need if you are a woman:   · A Pap smear  is used to screen for cervical cancer  Pap smears are usually done every 3 to 5 years depending on your age  You may need them more often if you have had abnormal Pap smear test results in the past  Ask your healthcare provider how often you should have a Pap smear  · A mammogram  is an x-ray of your breasts to screen for breast cancer  Experts recommend mammograms every 2 years starting at age 48 years  You may need a mammogram at age 52 years or younger if you have an increased risk for breast cancer  Talk to your healthcare provider about when you should start having mammograms and how often you need them  Vaccines you may need:   · Get an influenza vaccine  every year  The influenza vaccine protects you from the flu  Several types of viruses cause the flu  The viruses change over time, so new vaccines are made each year  · Get a tetanus-diphtheria (Td) booster vaccine  every 10 years  This vaccine protects you against tetanus and diphtheria  Tetanus is a severe infection that may cause painful muscle spasms and lockjaw  Diphtheria is a severe bacterial infection that causes a thick covering in the back of your mouth and throat  · Get a human papillomavirus (HPV) vaccine  if you are female and aged 23 to 32 or male 23 to 24 and never received it  This vaccine protects you from HPV infection  HPV is the most common infection spread by sexual contact  HPV may also cause vaginal, penile, and anal cancers  · Get a pneumococcal vaccine  if you are aged 72 years or older  The pneumococcal vaccine is an injection given to protect you from pneumococcal disease   Pneumococcal disease is an infection caused by pneumococcal bacteria  The infection may cause pneumonia, meningitis, or an ear infection  · Get a shingles vaccine  if you are 60 or older, even if you have had shingles before  The shingles vaccine is an injection to protect you from the varicella-zoster virus  This is the same virus that causes chickenpox  Shingles is a painful rash that develops in people who had chickenpox or have been exposed to the virus  How to eat healthy:  My Plate is a model for planning healthy meals  It shows the types and amounts of foods that should go on your plate  Fruits and vegetables make up about half of your plate, and grains and protein make up the other half  A serving of dairy is included on the side of your plate  The amount of calories and serving sizes you need depends on your age, gender, weight, and height  Examples of healthy foods are listed below:  · Eat a variety of vegetables  such as dark green, red, and orange vegetables  You can also include canned vegetables low in sodium (salt) and frozen vegetables without added butter or sauces  · Eat a variety of fresh fruits , canned fruit in 100% juice, frozen fruit, and dried fruit  · Include whole grains  At least half of the grains you eat should be whole grains  Examples include whole-wheat bread, wheat pasta, brown rice, and whole-grain cereals such as oatmeal     · Eat a variety of protein foods such as seafood (fish and shellfish), lean meat, and poultry without skin (turkey and chicken)  Examples of lean meats include pork leg, shoulder, or tenderloin, and beef round, sirloin, tenderloin, and extra lean ground beef  Other protein foods include eggs and egg substitutes, beans, peas, soy products, nuts, and seeds  · Choose low-fat dairy products such as skim or 1% milk or low-fat yogurt, cheese, and cottage cheese  · Limit unhealthy fats  such as butter, hard margarine, and shortening         Exercise:  Exercise at least 30 minutes per day on most days of the week  Some examples of exercise include walking, biking, dancing, and swimming  You can also fit in more physical activity by taking the stairs instead of the elevator or parking farther away from stores  Include muscle strengthening activities 2 days each week  Regular exercise provides many health benefits  It helps you manage your weight, and decreases your risk for type 2 diabetes, heart disease, stroke, and high blood pressure  Exercise can also help improve your mood  Ask your healthcare provider about the best exercise plan for you  General health and safety guidelines:   · Do not smoke  Nicotine and other chemicals in cigarettes and cigars can cause lung damage  Ask your healthcare provider for information if you currently smoke and need help to quit  E-cigarettes or smokeless tobacco still contain nicotine  Talk to your healthcare provider before you use these products  · Limit alcohol  A drink of alcohol is 12 ounces of beer, 5 ounces of wine, or 1½ ounces of liquor  · Lose weight, if needed  Being overweight increases your risk of certain health conditions  These include heart disease, high blood pressure, type 2 diabetes, and certain types of cancer  · Protect your skin  Do not sunbathe or use tanning beds  Use sunscreen with a SPF 15 or higher  Apply sunscreen at least 15 minutes before you go outside  Reapply sunscreen every 2 hours  Wear protective clothing, hats, and sunglasses when you are outside  · Drive safely  Always wear your seatbelt  Make sure everyone in your car wears a seatbelt  A seatbelt can save your life if you are in an accident  Do not use your cell phone when you are driving  This could distract you and cause an accident  Pull over if you need to make a call or send a text message  · Practice safe sex  Use latex condoms if are sexually active and have more than one partner   Your healthcare provider may recommend screening tests for sexually transmitted infections (STIs)  · Wear helmets, lifejackets, and protective gear  Always wear a helmet when you ride a bike or motorcycle, go skiing, or play sports that could cause a head injury  Wear protective equipment when you play sports  Wear a lifejacket when you are on a boat or doing water sports  © Copyright [employer] 2022 Information is for End User's use only and may not be sold, redistributed or otherwise used for commercial purposes  All illustrations and images included in CareNotes® are the copyrighted property of [employer]  or [facility] Marry Pineda   The above information is an  only  It is not intended as medical advice for individual conditions or treatments  Talk to your doctor, nurse or pharmacist before following any medical regimen to see if it is safe and effective for you

## 2022-12-08 NOTE — PROGRESS NOTES
FAMILY PRACTICE OFFICE VISIT    NAME: Moreno Trent    AGE: 50 y o  SEX: male  : 1970   MRN: [id number]    DATE: 2022  TIME: 11:23 AM    Assessment and Plan      1  Essential hypertension  Controlled  2  Colon cancer screening  Return for GI for colonoscopy and EGD    3  Physical exam  Anticipatory guidance and preventative medicine discussed    Note - h/o hyperproteinemia  Repeat returned to just about normal with normal SPEP    4  Muro's esophagus without dysplasia      5  Impaired fasting glucose  Check fasting labs tomorrow including hba1c      6  Allergic rhinitis due to pollen, unspecified seasonality  Stable  7  Mild intermittent asthma without complication  Stable  Sees pulmonary  Has underlying sarcoidosis and Hoang      8  Obstructive sleep apnea syndrome  Considering INSPIRE      9  Primary hypertension  Good control      10  Renal insufficiency  Pt did not adjust meds as previously instructed given overall worsening of renal funciton  Repeat tomorrow and may need to adjust hctz, tricor and lotensin dosing  Pt aware  Is scheduled to see renal    11  Hypercholesterolemia  Labs due      12  Sarcoidosis      13  Anxiety  Pt considering restarting zoloft      14  Hyperbilirubinemia  Had [address] [facility] [address] 2022  C/w hepatosplenomegaly  To consider repeat imaging at some point  Elevated lft's  Pt admits to cutting back on etoh intake  Denies etoh consumption during week  Drinking about 3 beers and a shot on the weekend  Encouragement given re:cutting back  But still encourage avoidance of etoh entirely - as can adversely interact with medications  Will also help with pt's sugar, lft's and GERD  Pt admits that he does feel improvement overall cutting back - more energy, more focused  15  Hypercalcemia  repeat    16  Abnormal urine  Will repeat with labs tomorrow  May need uro pending results  Pt did not go as previously referred      17    Family h/o graham  Pt going for evaluation (see below )  There are no Patient Instructions on file for this visit  Advise renal followup  As discussed and advised prior  Pt has appt set for feb  Recommend strongly repeating bmp today  Pt will check to be put on cancellation list     Pt not sure if he is taking tricor    Going for cancer risk /genetic assessment with cancer risk assessment center next week - given family h/o graham syndrome    Return in 6 mos    Chief Complaint     Chief Complaint   Patient presents with   • Physical Exam       History of Present Illness   Clark Harris is a 50y o -year-old male who presents today for PE  Reviewed meds  Pt has been taking hctz 25 mg tabs - a full tab once daily  It was advised that he decrease dose to 1/2 tab as of 8/2022  Pt states he did not do this  Review of Systems   Review of Systems   Cardiovascular:        Checking bp at home - one - teens to 120/upper 70's to low 80's     Gastrointestinal:        Taking nexium - working  [employer] for frequent egd's given barretts history  Endocrine:        Not checking sugars     Psychiatric/Behavioral: Positive for sleep disturbance  Negative for self-injury and suicidal ideas  Pt has not been taking zoloft  Had started taking it - and felt a little better with his anxiety - so stopped    Not sleeping well recently - due to work stress           Active Problem List     Patient Active Problem List   Diagnosis   • Allergic rhinitis   • Muro's esophagus   • Asthma, mild intermittent   • Hematuria   • Hiatal hernia   • Hypercholesterolemia   • Hypertension   • Impaired fasting glucose   • Sarcoidosis   • Sleep apnea   • 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   • Anxiety   • Elevated liver function tests   • Arm paresthesia, left   • Alcohol screening   • Overweight   • Fatigue   • Current mild episode of major depressive disorder (HCC)   • Depression, recurrent (HCC)   • Hyperbilirubinemia   • Hypercalcemia   • Renal insufficiency         Past Medical History:  Past Medical History:   Diagnosis Date   • Chest pain    • Sarcoidosis        Past Surgical History:  Past Surgical History:   Procedure Laterality Date   • BRONCHOSCOPY     • COLONOSCOPY      complete    • CYSTOSCOPY      Diagnostic - Neg    • ESOPHAGOGASTRODUODENOSCOPY      Diagnostic        Family History:  Family History   Problem Relation Age of Onset   • Colon cancer Mother         age in 35s   • Hyperlipidemia Mother    • Hyperlipidemia Father    • Pancreatic cancer Father         Adenocarcinoma of the ampulla of vater    • Colon cancer Maternal Uncle    • Colon cancer Other         maternal relatives - likely MGF and a second cousin in her 35s       Social History:  Social History     Socioeconomic History   • Marital status:      Spouse name: Not on file   • Number of children: Not on file   • Years of education: Not on file   • Highest education level: Not on file   Occupational History   • Occupation:     Tobacco Use   • Smoking status: Former     Types: Cigarettes     Quit date:      Years since quittin 9   • Smokeless tobacco: Former   • Tobacco comments:     smoked about 1 pack every 2 weeks for 2 years in his 19's    Vaping Use   • Vaping Use: Never used   Substance and Sexual Activity   • Alcohol use: Yes     Comment: reports down to 5 drinks once weekly   • Drug use: No   • Sexual activity: Not on file   Other Topics Concern   • Not on file   Social History Narrative    Lives home with wife and daughter      Social Determinants of Health     Financial Resource Strain: Not on file   Food Insecurity: Not on file   Transportation Needs: Not on file   Physical Activity: Not on file   Stress: Not on file   Social Connections: Not on file   Intimate Partner Violence: Not on file   Housing Stability: Not on file       Objective     Vitals:    22 1108   BP: 120/74   Pulse: 80   Resp: 20   Temp: 98 3 °F (36 8 °C)   SpO2: 95% Wt Readings from Last 3 Encounters:   12/08/22 87 3 kg (192 lb 6 4 oz)   09/22/22 85 5 kg (188 lb 9 6 oz)   06/07/22 88 6 kg (195 lb 6 4 oz)       Physical Exam  Vitals and nursing note reviewed  Constitutional:       General: He is not in acute distress  Appearance: Normal appearance  He is not ill-appearing or toxic-appearing  Eyes:      General: No scleral icterus  Neck:      Vascular: No carotid bruit  Cardiovascular:      Rate and Rhythm: Normal rate and regular rhythm  Pulses: Normal pulses  Heart sounds: Normal heart sounds  No murmur heard  Pulmonary:      Effort: Pulmonary effort is normal  No respiratory distress  Breath sounds: Normal breath sounds  No wheezing, rhonchi or rales  Abdominal:      General: There is no distension  Palpations: Abdomen is soft  There is no mass  Tenderness: There is no abdominal tenderness  There is no guarding or rebound  Musculoskeletal:      Cervical back: Neck supple  Right lower leg: No edema  Left lower leg: No edema  Lymphadenopathy:      Cervical: No cervical adenopathy  Skin:     General: Skin is warm  Coloration: Skin is not jaundiced  Neurological:      General: No focal deficit present  Mental Status: He is alert and oriented to person, place, and time  Psychiatric:         Mood and Affect: Mood normal          Behavior: Behavior normal          Thought Content:  Thought content normal          Judgment: Judgment normal          Pertinent Laboratory/Diagnostic Studies:  Lab Results   Component Value Date    GLUCOSE 99 07/25/2014    BUN 28 (H) 09/22/2022    CREATININE 1 29 09/22/2022    CALCIUM 10 3 (H) 09/22/2022     07/25/2014    K 3 8 09/22/2022    CO2 28 09/22/2022    CL 92 (L) 09/22/2022     Lab Results   Component Value Date     (H) 07/22/2022     (H) 07/22/2022    ALKPHOS 50 07/22/2022    BILITOT 0 66 07/25/2014       Lab Results   Component Value Date    WBC 8 01 09/22/2022    HGB 15 7 09/22/2022    HCT 46 0 09/22/2022    MCV 95 09/22/2022     09/22/2022       No results found for: TSH    Lab Results   Component Value Date    CHOL 239 07/25/2014     Lab Results   Component Value Date    TRIG 236 (H) 04/22/2022     Lab Results   Component Value Date    HDL 47 12/21/2021     Lab Results   Component Value Date    [facility]  12/21/2021      Comment:      Calculated LDL invalid, triglycerides >400 mg/dl  This screening LDL is a calculated result  It does not have the accuracy of the Direct Measured LDL in the monitoring of patients with hyperlipidemia and/or statin therapy  Direct Measure LDL (IQE312) must be ordered separately in these patients       Lab Results   Component Value Date    HGBA1C 5 2 07/22/2022       Results for orders placed or performed in visit on 45/80/58   Basic metabolic panel   Result Value Ref Range    Sodium 131 (L) 135 - 147 mmol/L    Potassium 3 8 3 5 - 5 3 mmol/L    Chloride 92 (L) 96 - 108 mmol/L    CO2 28 21 - 32 mmol/L    ANION GAP 11 4 - 13 mmol/L    BUN 28 (H) 5 - 25 mg/dL    Creatinine 1 29 0 60 - 1 30 mg/dL    Glucose, Fasting 116 (H) 65 - 99 mg/dL    Calcium 10 3 (H) 8 3 - 10 1 mg/dL    eGFR 64 ml/min/1 73sq m   CBC and differential   Result Value Ref Range    WBC 8 01 4 31 - 10 16 Thousand/uL    RBC 4 85 3 88 - 5 62 Million/uL    Hemoglobin 15 7 12 0 - 17 0 g/dL    Hematocrit 46 0 36 5 - 49 3 %    MCV 95 82 - 98 fL    MCH 32 4 26 8 - 34 3 pg    MCHC 34 1 31 4 - 37 4 g/dL    RDW 12 6 11 6 - 15 1 %    MPV 11 0 8 9 - 12 7 fL    Platelets 036 174 - 603 Thousands/uL    nRBC 0 /100 WBCs    Neutrophils Relative 66 43 - 75 %    Immat GRANS % 1 0 - 2 %    Lymphocytes Relative 18 14 - 44 %    Monocytes Relative 11 4 - 12 %    Eosinophils Relative 3 0 - 6 %    Basophils Relative 1 0 - 1 %    Neutrophils Absolute 5 35 1 85 - 7 62 Thousands/µL    Immature Grans Absolute 0 06 0 00 - 0 20 Thousand/uL    Lymphocytes Absolute 1 41 0 60 - 4 47 Thousands/µL    Monocytes Absolute 0 91 0 17 - 1 22 Thousand/µL    Eosinophils Absolute 0 21 0 00 - 0 61 Thousand/µL    Basophils Absolute 0 07 0 00 - 0 10 Thousands/µL   Calcium, ionized   Result Value Ref Range    Calcium, Ionized 1 16 1 12 - 1 32 mmol/L   PTH, intact   Result Value Ref Range    PTH 36 1 18 4 - 80 1 pg/mL   Vitamin D 25 hydroxy   Result Value Ref Range    Vit D, 25-Hydroxy 13 0 (L) 30 0 - 100 0 ng/mL   Phosphorus   Result Value Ref Range    Phosphorus 3 0 2 7 - 4 5 mg/dL       No orders of the defined types were placed in this encounter        ALLERGIES:  Allergies   Allergen Reactions   • Other      seasonal       Current Medications     Current Outpatient Medications   Medication Sig Dispense Refill   • albuterol (PROVENTIL HFA,VENTOLIN HFA) 90 mcg/act inhaler Inhale 2 puffs every 6 (six) hours as needed     • amLODIPine (NORVASC) 10 mg tablet TAKE 1 TABLET BY MOUTH EVERY DAY 90 tablet 0   • atenolol (TENORMIN) 100 mg tablet TAKE 1 TABLET BY MOUTH EVERY DAY 90 tablet 0   • benazepril (LOTENSIN) 40 MG tablet TAKE 1 TABLET BY MOUTH EVERY DAY 90 tablet 1   • Blood Glucose Monitoring Suppl (FreeStyle Freedom Lite) w/Device KIT Use to test 2x daily, 2hours after a meal 1 each 1   • ergocalciferol (VITAMIN D2) 50,000 units Take 1 capsule (50,000 Units total) by mouth once a week Once weekly for 8 weeks; do not take over the counter vit D3 while on high dose 8 capsule 0   • esomeprazole (NexIUM) 40 MG capsule Take 40 mg by mouth 2 (two) times a day    0   • fenofibrate (TRICOR) 48 mg tablet TAKE 1 TAB BY MOUTH EVERY OTHER DAY - IN THE AM 45 tablet 0   • fluticasone (FLONASE) 50 mcg/act nasal spray 1 spray into each nostril daily as needed for rhinitis     • glucose blood (FREESTYLE LITE) test strip USE AS INSTRUCTED WITH GLUCOMETER TO TEST BLOOD GLUCOSE 100 each 1   • hydrochlorothiazide (HYDRODIURIL) 25 mg tablet TAKE 1/2 TAB (OR 12 5 MG ) BY MOUTH DAILY DOSE DECREASED AS OF 8/9/2022 45 tablet 1   • Lancets (freestyle) lancets USE TO TEST BLOOD SUGARSS 2X DAILY 2 HOURS AFTER MEALS 100 each 0   • Milk Thistle 250 MG CAPS Take 1 caplet by mouth daily     • rosuvastatin (CRESTOR) 40 MG tablet TAKE 1 TABLET BY MOUTH EVERY DAY 90 tablet 2   • sertraline (Zoloft) 25 mg tablet Take 1 tab po daily with supper X 2 weeks; then start the 50 mg tabs 14 tablet 0   • sertraline (ZOLOFT) 50 mg tablet TAKE 1 TABLET BY MOUTH DAILY IN THE EVENING WITH SUPPER (ONCE FINISHED WITH THE 2 WEEKS ON 25 MG TABS) 90 tablet 1   • sildenafil (VIAGRA) 50 MG tablet TAKE 1 TABLET BY MOUTH DAILY AS NEEDED FOR ERECTILE DYSFUNCTION TAKE 30-60 MIN PRIOR TO SEXUAL ACTIVITY DO NOT TAKE MORE THAN 1 TAB IN 24 HOURS  8 tablet 6     No current facility-administered medications for this visit           Health Maintenance     Health Maintenance   Topic Date Due   • Hepatitis B Vaccine (1 of 3 - 3-dose series) Never done   • COVID-19 Vaccine (3 - Booster for Moderna series) 08/04/2021   • Pneumococcal Vaccine: Pediatrics (0 to 5 Years) and At-Risk Patients (6 to 59 Years) (2 - PCV) 12/16/2021   • BMI: Followup Plan  03/17/2022   • Annual Physical  10/28/2022   • Colorectal Cancer Screening  02/11/2023   • Depression Remission PHQ  05/17/2023   • BMI: Adult  12/08/2023   • DTaP,Tdap,and Td Vaccines (3 - Td or Tdap) 01/11/2027   • HIV Screening  Completed   • Hepatitis C Screening  Completed   • Influenza Vaccine  Completed   • HIB Vaccine  Aged Out   • IPV Vaccine  Aged Out   • Hepatitis A Vaccine  Aged Out   • Meningococcal ACWY Vaccine  Aged Out   • HPV Vaccine  Aged Out     Immunization History   Administered Date(s) Administered   • COVID-19 MODERNA VACC 0 5 ML IM 02/04/2021, 03/04/2021   • INFLUENZA 10/12/2018, 12/02/2021, 09/22/2022   • Influenza Injectable, MDCK, Preservative Free, Quadrivalent, 0 5 mL 10/10/2020   • Influenza Quadrivalent Preservative Free 3 years and older IM 10/01/2017   • Influenza, recombinant, quadrivalent,injectable, preservative free 09/06/2019, 09/22/2022   • Influenza, seasonal, injectable 01/02/2007, 11/08/2015   • Pneumococcal Polysaccharide PPV23 12/16/2020   • Tdap 11/30/2015, 01/11/2017          Lourdes Cornejo DO

## 2022-12-30 ENCOUNTER — OFFICE VISIT (OUTPATIENT)
Dept: FAMILY MEDICINE CLINIC | Facility: CLINIC | Age: 52
End: 2022-12-30

## 2022-12-30 ENCOUNTER — TELEPHONE (OUTPATIENT)
Dept: NEPHROLOGY | Facility: CLINIC | Age: 52
End: 2022-12-30

## 2022-12-30 VITALS
TEMPERATURE: 98.4 F | HEIGHT: 70 IN | HEART RATE: 79 BPM | RESPIRATION RATE: 18 BRPM | BODY MASS INDEX: 27.83 KG/M2 | SYSTOLIC BLOOD PRESSURE: 110 MMHG | WEIGHT: 194.4 LBS | OXYGEN SATURATION: 95 % | DIASTOLIC BLOOD PRESSURE: 80 MMHG

## 2022-12-30 DIAGNOSIS — H69.83 DYSFUNCTION OF BOTH EUSTACHIAN TUBES: Primary | ICD-10-CM

## 2022-12-30 NOTE — PROGRESS NOTES
FAMILY PRACTICE ACUTE OFFICE VISIT  [facility] - [facility] PRIMARY CARE       NAME: Moreno Trent  AGE: 50 y o  SEX: male       : 1970        MRN: [id number]    DATE: 2022  TIME: 10:27 AM    Assessment and Plan     Problem List Items Addressed This Visit    None  Visit Diagnoses     Dysfunction of both eustachian tubes    -  Primary    Encouraged patient to start Flonase 2 sprays per nostril daily  Call if symptoms worsen or fail to resolve in the next 1-2 weeks  Chief Complaint   No chief complaint on file  History of Present Illness   Freeman Dunham is a 50y o -year-old male who presents for blocked ears  He notes that has had trouble with clogged ears for the last week  He tried OTC drops and it seemed to help but then recurred about 2 days ago  He notes that he had 2 good days and then it restarted  He denies anything like this previously  He reports that recently his girlfriend had sinus infection  He denies sinus pressure  Ear Fullness   Associated symptoms include diarrhea (yesterday AM but resolved) and a sore throat (at the beginning about a week ago - resolved now)  Pertinent negatives include no coughing, ear discharge, headaches, rash, rhinorrhea or vomiting  Review of Systems   Review of Systems   Constitutional: Negative for chills and fever  HENT: Positive for ear pain and sore throat (at the beginning about a week ago - resolved now)  Negative for congestion, ear discharge, postnasal drip and rhinorrhea  Respiratory: Positive for wheezing (a few days ago)  Negative for cough and shortness of breath  Gastrointestinal: Positive for diarrhea (yesterday AM but resolved)  Negative for nausea and vomiting  Skin: Negative for rash  Neurological: Negative for dizziness, syncope, light-headedness and headaches         Active Problem List     Patient Active Problem List   Diagnosis   • Allergic rhinitis   • Muro's esophagus   • Asthma, mild intermittent   • Hematuria   • Hiatal hernia   • Hypercholesterolemia   • Hypertension   • Impaired fasting glucose   • Sarcoidosis   • Sleep apnea   • 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   • Anxiety   • Elevated liver function tests   • Arm paresthesia, left   • Alcohol screening   • Overweight   • Fatigue   • Current mild episode of major depressive disorder (HCC)   • Depression, recurrent (HCC)   • Hyperbilirubinemia   • Hypercalcemia   • Renal insufficiency   • Family history of Barakat syndrome   • Hyperproteinemia         Social History:  Social History     Socioeconomic History   • Marital status:      Spouse name: Not on file   • Number of children: Not on file   • Years of education: Not on file   • Highest education level: Not on file   Occupational History   • Occupation:     Tobacco Use   • Smoking status: Former     Types: Cigarettes     Quit date:      Years since quittin 0   • Smokeless tobacco: Former   • Tobacco comments:     smoked about 1 pack every 2 weeks for 2 years in his 19's    Vaping Use   • Vaping Use: Never used   Substance and Sexual Activity   • Alcohol use: Yes     Comment: reports down to 5 drinks once weekly   • Drug use: No   • Sexual activity: Not on file   Other Topics Concern   • Not on file   Social History Narrative    Lives home with wife and daughter      Social Determinants of Health     Financial Resource Strain: Not on file   Food Insecurity: Not on file   Transportation Needs: Not on file   Physical Activity: Not on file   Stress: Not on file   Social Connections: Not on file   Intimate Partner Violence: Not on file   Housing Stability: Not on file       Objective     Vitals:    22 1000   BP: 110/80   BP Location: Left arm   Cuff Size: Standard   Pulse: 79   Resp: 18   Temp: 98 4 °F (36 9 °C)   TempSrc: Tympanic   SpO2: 95%   Weight: 88 2 kg (194 lb 6 4 oz)   Height: 5' 10" (1 778 m)     Wt Readings from Last 3 Encounters:   12/30/22 88 2 kg (194 lb 6 4 oz)   12/08/22 87 3 kg (192 lb 6 4 oz)   09/22/22 85 5 kg (188 lb 9 6 oz)       Physical Exam  Vitals reviewed  Constitutional:       General: He is not in acute distress  Appearance: Normal appearance  He is well-developed  He is not ill-appearing  HENT:      Head: Normocephalic and atraumatic  Right Ear: Tympanic membrane, ear canal and external ear normal  There is no impacted cerumen  Left Ear: Tympanic membrane, ear canal and external ear normal  There is no impacted cerumen  Nose: Congestion present  No rhinorrhea  Right Sinus: No maxillary sinus tenderness or frontal sinus tenderness  Left Sinus: No maxillary sinus tenderness or frontal sinus tenderness  Mouth/Throat:      Mouth: Mucous membranes are moist       Pharynx: No oropharyngeal exudate  Eyes:      General: Lids are normal       Conjunctiva/sclera: Conjunctivae normal       Pupils: Pupils are equal, round, and reactive to light  Neck:      Thyroid: No thyromegaly  Trachea: Trachea normal    Cardiovascular:      Rate and Rhythm: Normal rate and regular rhythm  Pulses: Normal pulses  Radial pulses are 2+ on the right side and 2+ on the left side  Heart sounds: Normal heart sounds  No murmur heard  Pulmonary:      Effort: Pulmonary effort is normal       Breath sounds: Normal breath sounds  No decreased breath sounds, wheezing, rhonchi or rales  Musculoskeletal:      Cervical back: Normal range of motion and neck supple  Lymphadenopathy:      Cervical: No cervical adenopathy  Skin:     Findings: No rash  Neurological:      Mental Status: He is alert  Psychiatric:         Behavior: Behavior normal          Thought Content:  Thought content normal          Judgment: Judgment normal                ALLERGIES:  Allergies   Allergen Reactions   • Other      seasonal       Current Medications Current Outpatient Medications   Medication Sig Dispense Refill   • albuterol (PROVENTIL HFA,VENTOLIN HFA) 90 mcg/act inhaler Inhale 2 puffs every 6 (six) hours as needed     • amLODIPine (NORVASC) 10 mg tablet TAKE 1 TABLET BY MOUTH EVERY DAY 90 tablet 0   • atenolol (TENORMIN) 100 mg tablet TAKE 1 TABLET BY MOUTH EVERY DAY 90 tablet 0   • benazepril (LOTENSIN) 40 MG tablet TAKE 1 TABLET BY MOUTH EVERY DAY 90 tablet 1   • Blood Glucose Monitoring Suppl (FreeStyle Freedom Lite) w/Device KIT Use to test 2x daily, 2hours after a meal 1 each 1   • ergocalciferol (VITAMIN D2) 50,000 units Take 1 capsule (50,000 Units total) by mouth once a week Once weekly for 8 weeks; do not take over the counter vit D3 while on high dose 8 capsule 0   • esomeprazole (NexIUM) 40 MG capsule Take 40 mg by mouth 2 (two) times a day    0   • fenofibrate (TRICOR) 48 mg tablet TAKE 1 TAB BY MOUTH EVERY OTHER DAY - IN THE AM 45 tablet 0   • fluticasone (FLONASE) 50 mcg/act nasal spray 1 spray into each nostril daily as needed for rhinitis     • glucose blood (FREESTYLE LITE) test strip USE AS INSTRUCTED WITH GLUCOMETER TO TEST BLOOD GLUCOSE 100 each 1   • hydrochlorothiazide (HYDRODIURIL) 25 mg tablet TAKE 1/2 TAB (OR 12 5 MG ) BY MOUTH DAILY DOSE DECREASED AS OF 8/9/2022 45 tablet 1   • Lancets (freestyle) lancets USE TO TEST BLOOD SUGARSS 2X DAILY 2 HOURS AFTER MEALS 100 each 0   • Milk Thistle 250 MG CAPS Take 1 caplet by mouth daily     • rosuvastatin (CRESTOR) 40 MG tablet TAKE 1 TABLET BY MOUTH EVERY DAY 90 tablet 2   • sildenafil (VIAGRA) 50 MG tablet TAKE 1 TABLET BY MOUTH DAILY AS NEEDED FOR ERECTILE DYSFUNCTION TAKE 30-60 MIN PRIOR TO SEXUAL ACTIVITY DO NOT TAKE MORE THAN 1 TAB IN 24 HOURS  8 tablet 6   • Zoster Vac Recomb Adjuvanted (Shingrix) 50 MCG/0 5ML SUSR 0 5mL IM for one dose, followed by 0 5mL IM 2-6 months after first dose 1 each 1     No current facility-administered medications for this visit           Lance Sierra ANTOINE  12/30/2022 10:27 AM  [facility]

## 2022-12-30 NOTE — TELEPHONE ENCOUNTER
Appointment Confirmation   Person confirmed appointment with  If not patient, name of the person Answering Machine    Date and time of appointment 12/30/22  2:30 PM   Patient acknowledged and will be at appointment? no    Did you advise the patient that they will need a urine sample if they are a new patient?  Yes    Did you advise the patient to bring their current medications for verification? (including any OTC) Yes    Additional Information

## 2023-01-03 ENCOUNTER — CONSULT (OUTPATIENT)
Dept: NEPHROLOGY | Facility: CLINIC | Age: 53
End: 2023-01-03

## 2023-01-03 ENCOUNTER — TELEPHONE (OUTPATIENT)
Dept: NEPHROLOGY | Facility: CLINIC | Age: 53
End: 2023-01-03

## 2023-01-03 VITALS
DIASTOLIC BLOOD PRESSURE: 72 MMHG | HEIGHT: 70 IN | WEIGHT: 194 LBS | SYSTOLIC BLOOD PRESSURE: 110 MMHG | BODY MASS INDEX: 27.77 KG/M2

## 2023-01-03 DIAGNOSIS — N28.9 RENAL INSUFFICIENCY: ICD-10-CM

## 2023-01-03 DIAGNOSIS — E78.00 HYPERCHOLESTEROLEMIA: ICD-10-CM

## 2023-01-03 DIAGNOSIS — R80.9 PROTEINURIA, UNSPECIFIED TYPE: Primary | ICD-10-CM

## 2023-01-03 DIAGNOSIS — E83.52 HYPERCALCEMIA: ICD-10-CM

## 2023-01-03 DIAGNOSIS — I10 PRIMARY HYPERTENSION: ICD-10-CM

## 2023-01-03 RX ORDER — FUROSEMIDE 20 MG/1
20 TABLET ORAL DAILY
Qty: 90 TABLET | Refills: 3 | Status: SHIPPED | OUTPATIENT
Start: 2023-01-03

## 2023-01-03 NOTE — TELEPHONE ENCOUNTER
----- Message from XU DIOP MD sent at 1/3/2023  1:48 PM EST -----  Yes  Please  Thanks    ----- Message -----  From: Gilson Reveles  Sent: 1/3/2023  12:50 PM EST  To: XU DIOP MD    No No openings for 4 or 5 months is it okay to use a new patient slot ?

## 2023-01-03 NOTE — PATIENT INSTRUCTIONS
- can take tylenol or aspirin for headaches  - get blood work after the visit  - then stop hydrochlorothiazide and start lasix 20mg once a day  - get repeat blood work in 1 month    - Please call me in 10 days after having your blood work done to review the results if you do not hear back from me or my office, as I may have not received the results  - please remember to perform blood work prior to the next visit  - Please call if the blood pressure top number is greater than 150 or less than 110 consistently  - Please call if you are gaining more than 2lbs in 2 days for adjustment of water pills   ~ Please AVOID the following pain medications  LIST OF NSAIDS (NONSTEROIDAL ANTI-INFLAMMATORY DRUGS) AND FRY-2 INHIBITORS    DIFLUNISAL (DOLOBID)  IBUPROFEN (MOTRIN, ADVIL)  FLURBIPROFEN (ANSAID)  KETOPROFEN (ORUDIS, ORUVAIL)  FENOPROFEN (NALFON)  NABUMETONE (RELAFEN)  PIROXICAM (FELDENE)  NAPROXEN (ALEVE, NAPROSYN, NAPRELAN, ANAPROX)  DICLOFENAC (VOLTAREN, CATAFLAM)  INDOMETHACIN (INDOCIN)  SULINDAC (CLINORIL)  TOLMETIN (TOLETIN)  ETODOLAC (LODINE)  MELOXICAM (MOBIC)  KETOROLAC (TORADOL)  OXAPROZIN (DAYPRO)  CELECOXIB (CELEBREX)    Things to do to reduce your blood pressure include working with all your physician to do the following:  ~ stop smoking if you smoke  ~ increase cardiovascular exercise like walking and swimming    ~ modify your diet to decrease fat and salt intake  ~ reduce your weight if you are overweight or obese   ~ increase the consumption of fruits, vegetables and whole grains  ~ decrease alcohol consumption if you consume alcohol    ~ try to minimize stress in your life with lifestyle modifications  ~ be compliant with your anti-hypertensive medications  ~ adjust your medications to help improve your vascular stiffness and decrease risks for heart attacks and strokes

## 2023-01-03 NOTE — PROGRESS NOTES
Nephrology Initial Consultation  Elina Paiz 50 y o  male MRN: [id number]            REASON FOR CONSULTATION:  Elina Paiz is a 50 y o male who was referred by Ranjana Costa DO for evaluation of Abnormal Lab    ASSESSMENT / PLAN:   50 y o   male with pmh of multiple co-morbidities including hypertension (x 20yrs), asthma, hepatic steatosis, ELEONORA on CPAP, anxiety, GERD, hyperlipidemia, vitamin D deficiency and sarcoidosis (dx 2007) presents to the office for evaluation and management of abnormal renal parameters  Renal function/likely underlying CKD stage II:  - After review of records in [facility] as well as Care everywhere patient has a baseline creatinine of 0 9-1 3 mg/dL from 2014 up until December 2021  Most recent labs show a Creatinine of 1 29 mg/dL on 9/22/2022  Renal function remains stable  Blood work after the visit  - Status post episode of acute kidney injury in July 2022 with a peak creatinine 1 82 mg/dL on 7/22/2022  - Likely has underlying CKD secondary to hypertensive nephrosclerosis plus age-related nephron loss plus prior episode of acute kidney injury nondialysis requiring plus at risk for CKD due to chronic alcohol usage  We will check blood work after the visit  -SPEP from July 2022 within normal limits  - At risk for renal sarcoidosis  We will do further work-up  Check serum ACE levels, 125 vitamin D levels, UA  No acute indication for renal biopsy at this time  Will check UA if significant proteinuria then may consider renal biopsy  This was discussed with the patient  On previous UA he did have a lot of white blood cells, concern for interstitial nephritis secondary to sarcoidosis along with proteinuria presents however it was a concentrated specimen and had ketones also  - Abdominal ultrasound from April 2022 showing bilateral kidneys approximately 12 cm no hydronephrosis no masses    - Acid base and lytes stable except for mild hyponatremia and hypercalcemia, check blood work after the visit  Likely secondary to HCTZ usage after getting blood work post this visit advised patient to discontinue his HCTZ and switch over to Lasix we will repeat blood work 1 month after that to see changes in parameters and then decide if patient has signs and other parameters suggestive of renal sarcoidosis and if patient needs renal biopsy  - Clinically the patient appears to be euvolemic  - Recommend to avoid use of NSAIDs, nephrotoxins  Caution advised with regards to exposure to IV contrast dye    - Discussed with the patient in depth his renal status, including the possible etiologies for CKD  - Advised the patient that when his GFR is close to 20mL/min then will start discussing about RRT(renal replacement therapy) options such as renal transplant, peritoneal dialysis and hemodialysis  - Informed the patient about the various options for Renal Replacement therapy  - Discussed with the patient how we need to work together to delay the progression of CKD with optimal BP control based on their age and co-morbidities, optimal BS control with HbA1c of <7% and trying to reduce proteinuria by the use of anti-proteinuric agents  Resistant hypertension:  - Patient is on Norvasc 10 mg p o  daily, atenolol 100 mg p o  daily, benazepril 40 mg p o  daily, hydrochlorothiazide 12 5 mg p o  daily  -Get blood work after the visit discontinue HCTZ and start Lasix 20 mg p o  daily check blood work after 1 month of doing that  HCTZ discontinued due to issues with hyponatremia and hypercalcemia   - Goal BP of <  130/80 based on age and comorbidities  - Instructed to follow low sodium (2gm)diet   - Advised to hold ACEI/ARBs if patient suffers from dehydration due to gastrointestinal losses due to risk of DEANNE secondary to failure to autoregulate  Hemoglobin:  - Goal Hb of 10-12 g/dL  - Most recent labs suggestive of 15 7 g/dL  -No role for IV iron     MBD(Mineral Bone Disease):   - Based on patients CKD stage following is the goal of therapy  - Maintain calcium phosphorus product of < 55   -Most recent vitamin D level 13 as of 9/22/2022  - Continue patient on no vitamin D for now  -Check vitamin D in 125 vitamin D level after the visit    Proteinuria:  - most recent UA from September 2022 positive ketones positive protein no RBCs  - check protein creatinine ratio, UA  - currently on therapy for proteinuria with benazepril, vitamin D, Crestor    Lipids:  -On Crestor,   - goal LDL less than 70  - Management as per PCP    Impaired glucose tolerance/GERD:  - management as per Primary team  - on no medications at this time diet controlled for his glucose intolerance  - Discussed with patient with regards to if possible to switch over from PPI to Zantac or Pepcid due to association of long-term use of PPI with CKD and certain recent studies  Unfortunately his acid reflux is so severe he is not able to tolerate switching  ELEONORA/history of sarcoidosis:  - Management as per primary team  -Follow-up with pulmonary last seen 11/23/2022   -As per patient he was diagnosed with sarcoidosis in 2007 did not get any treatment for that  Routinely follows up with ophthalmology for eye checks  Elevated LFTs:  -Likely secondary to alcohol usage however advised patient to follow-up with PCP and GI  Could also be secondary to underlying sarcoidosis  Nutrition:  - Encouraged patient to follow a renal diet comprising of moderate potassium, low phosphorus and protein restriction to 0 8gm/kg  - Will check serum albumin with next blood work  Followup:  - Patient is to follow-up in 4 months, with lab work to be performed after the visit then again in 1 month and then again in a few days prior to the next visit  Advised patient to call me in 10 days to review the results if they do not hear back from me, as I may have not received the results      Ethel Casas MD, [facility], 1/3/2023, 1:37 PM             HISTORY OF PRESENT ILLNESS: 50 y o  male with multiple comorbidities including hypertension (x 20yrs), asthma, hepatic steatosis, ELEONORA on CPAP, anxiety, GERD, hyperlipidemia, vitamin D deficiency and sarcoidosis (dx 2007) presents to the office for evaluation and management of abnormal renal parameters  Review of record shows patient has a baseline creatinine of 0 9 to 1 3 mg/dL from 2014 up until December 2021 had episode of acute kidney injury in July 2022 with peak creatinine 1 82 mg/dL on 7/22/2022  Most recent labs from 09/22/2022 at 1 29 mg/dL  Was diagnosed with sarcoidosis in 2007 s/p biopsy of lung  Reports was not treated for the sarcoidosis  Does routinely follow with opthomology  No history of DEANNE needing dialysis never seen a nephrologist before no history of kidney stones  Not checking blood pressures at home  Thankful for the care information that he is gone today  Does consume significant amount of alcohol on a routine basis  Not taking NSAIDs  Review of Systems   Constitutional: Negative for chills and fever  HENT: Positive for rhinorrhea and sore throat  Negative for congestion  Respiratory: Positive for wheezing  Negative for cough and shortness of breath  Cardiovascular: Negative for leg swelling  Gastrointestinal: Negative for constipation, nausea and vomiting  Genitourinary: Negative for difficulty urinating, dysuria and hematuria  Musculoskeletal: Negative for back pain  Skin: Negative for wound  Neurological: Negative for dizziness, light-headedness and headaches  Psychiatric/Behavioral: Negative for agitation and confusion  All other systems reviewed and are negative        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   • Anxiety   • Elevated liver function tests   • Arm paresthesia, left   • Alcohol screening   • Overweight   • Fatigue   • Current mild episode of major depressive disorder (HCC)   • Depression, recurrent (HCC)   • Hyperbilirubinemia   • Hypercalcemia   • Renal insufficiency   • Family history of Barakat syndrome   • Hyperproteinemia       PAST SURGICAL HISTORY:  Past Surgical History:   Procedure Laterality Date   • BRONCHOSCOPY     • COLONOSCOPY      complete    • CYSTOSCOPY      Diagnostic - Neg 2007   • ESOPHAGOGASTRODUODENOSCOPY      Diagnostic        SOCIAL HISTORY :   reports that he quit smoking about 24 years ago  His smoking use included cigarettes  He has quit using smokeless tobacco  He reports current alcohol use  He reports that he does not use drugs  FAMILY HISTORY:  Family History   Problem Relation Age of Onset   • Colon cancer Mother         age in 35s   • Hyperlipidemia Mother    • Hyperlipidemia Father    • Pancreatic cancer Father         Adenocarcinoma of the ampulla of vater    • Colon cancer Maternal Uncle    • Colon cancer Other         maternal relatives - likely MGF and a second cousin in her 35s       ALLERGIES:  Allergies   Allergen Reactions   • Other      seasonal           PHYSICAL EXAM:  Vitals:    01/03/23 1209   BP: 110/72   BP Location: Left arm   Patient Position: Sitting   Cuff Size: Large   Weight: 88 kg (194 lb)   Height: 5' 10" (1 778 m)     Body mass index is 27 84 kg/m²  Physical Exam  Vitals reviewed  Constitutional:       General: He is not in acute distress  Appearance: Normal appearance  He is normal weight  He is not ill-appearing, toxic-appearing or diaphoretic  HENT:      Head: Normocephalic and atraumatic  Mouth/Throat:      Mouth: Mucous membranes are moist       Pharynx: Oropharynx is clear     Eyes: General: No scleral icterus  Conjunctiva/sclera: Conjunctivae normal    Cardiovascular:      Rate and Rhythm: Normal rate  Heart sounds: No murmur heard  No friction rub  Pulmonary:      Effort: Pulmonary effort is normal  No respiratory distress  Breath sounds: Normal breath sounds  No stridor  Abdominal:      General: There is no distension  Palpations: Abdomen is soft  There is no mass  Tenderness: There is no abdominal tenderness  There is no right CVA tenderness or left CVA tenderness  Musculoskeletal:         General: No swelling  Cervical back: Normal range of motion and neck supple  No rigidity  Skin:     General: Skin is warm  Coloration: Skin is not jaundiced  Neurological:      General: No focal deficit present  Mental Status: He is alert and oriented to person, place, and time     Psychiatric:         Mood and Affect: Mood normal          Behavior: Behavior normal            LABORATORY DATA:     Results from last 6 Months   Lab Units 09/22/22  1020 07/22/22  1611   WBC Thousand/uL 8 01  --    HEMOGLOBIN g/dL 15 7  --    HEMATOCRIT % 46 0  --    PLATELETS Thousands/uL 209  --    POTASSIUM mmol/L 3 8 3 5   CHLORIDE mmol/L 92* 98   CO2 mmol/L 28 28   BUN mg/dL 28* 38*   CREATININE mg/dL 1 29 1 82*   CALCIUM mg/dL 10 3* 10 5*   PHOSPHORUS mg/dL 3 0  --         rest all reviewed    RADIOLOGY:  No orders to display     Rest all reviewed        MEDICATIONS:    Current Outpatient Medications:   •  albuterol (PROVENTIL HFA,VENTOLIN HFA) 90 mcg/act inhaler, Inhale 2 puffs every 6 (six) hours as needed, Disp: , Rfl:   •  amLODIPine (NORVASC) 10 mg tablet, TAKE 1 TABLET BY MOUTH EVERY DAY, Disp: 90 tablet, Rfl: 0  •  atenolol (TENORMIN) 100 mg tablet, TAKE 1 TABLET BY MOUTH EVERY DAY, Disp: 90 tablet, Rfl: 0  •  benazepril (LOTENSIN) 40 MG tablet, TAKE 1 TABLET BY MOUTH EVERY DAY (Patient taking differently: 2 (two) times a day TAKE 1 TABLET BY MOUTH EVERY DAY), Disp: 90 tablet, Rfl: 1  •  Blood Glucose Monitoring Suppl (FreeStyle Freedom Lite) w/Device KIT, Use to test 2x daily, 2hours after a meal, Disp: 1 each, Rfl: 1  •  esomeprazole (NexIUM) 40 MG capsule, Take 40 mg by mouth 2 (two) times a day  , Disp: , Rfl: 0  •  fluticasone (FLONASE) 50 mcg/act nasal spray, 1 spray into each nostril daily as needed for rhinitis, Disp: , Rfl:   •  furosemide (LASIX) 20 mg tablet, Take 1 tablet (20 mg total) by mouth daily, Disp: 90 tablet, Rfl: 3  •  glucose blood (FREESTYLE LITE) test strip, USE AS INSTRUCTED WITH GLUCOMETER TO TEST BLOOD GLUCOSE, Disp: 100 each, Rfl: 1  •  Lancets (freestyle) lancets, USE TO TEST BLOOD SUGARSS 2X DAILY 2 HOURS AFTER MEALS, Disp: 100 each, Rfl: 0  •  Milk Thistle 250 MG CAPS, Take 2 caplets by mouth daily, Disp: , Rfl:   •  rosuvastatin (CRESTOR) 40 MG tablet, TAKE 1 TABLET BY MOUTH EVERY DAY, Disp: 90 tablet, Rfl: 2  •  sildenafil (VIAGRA) 50 MG tablet, TAKE 1 TABLET BY MOUTH DAILY AS NEEDED FOR ERECTILE DYSFUNCTION TAKE 30-60 MIN PRIOR TO SEXUAL ACTIVITY DO NOT TAKE MORE THAN 1 TAB IN 24 HOURS , Disp: 8 tablet, Rfl: 6        Portions of the record may have been created with voice recognition software  Occasional wrong word or "sound a like" substitutions may have occurred due to the inherent limitations of voice recognition software  Read the chart carefully and recognize, using context, where substitutions have occurred  If you have any questions, please contact the dictating provider

## 2023-01-20 DIAGNOSIS — J45.20 MILD INTERMITTENT ASTHMA WITHOUT COMPLICATION: Primary | ICD-10-CM

## 2023-01-22 RX ORDER — ALBUTEROL SULFATE 90 UG/1
AEROSOL, METERED RESPIRATORY (INHALATION)
Qty: 6.7 G | Refills: 0 | Status: SHIPPED | OUTPATIENT
Start: 2023-01-22

## 2023-01-28 DIAGNOSIS — I10 HYPERTENSION, UNSPECIFIED TYPE: ICD-10-CM

## 2023-01-28 RX ORDER — ATENOLOL 100 MG/1
TABLET ORAL
Qty: 90 TABLET | Refills: 0 | Status: SHIPPED | OUTPATIENT
Start: 2023-01-28

## 2023-03-03 DIAGNOSIS — I10 HYPERTENSION, UNSPECIFIED TYPE: ICD-10-CM

## 2023-03-03 DIAGNOSIS — I10 ESSENTIAL HYPERTENSION: ICD-10-CM

## 2023-03-03 RX ORDER — ATENOLOL 100 MG/1
TABLET ORAL
Qty: 90 TABLET | Refills: 0 | Status: SHIPPED | OUTPATIENT
Start: 2023-03-03

## 2023-03-03 RX ORDER — AMLODIPINE BESYLATE 10 MG/1
TABLET ORAL
Qty: 90 TABLET | Refills: 0 | Status: SHIPPED | OUTPATIENT
Start: 2023-03-03

## 2023-03-07 ENCOUNTER — TELEPHONE (OUTPATIENT)
Dept: FAMILY MEDICINE CLINIC | Facility: CLINIC | Age: 53
End: 2023-03-07

## 2023-03-13 DIAGNOSIS — J45.20 MILD INTERMITTENT ASTHMA WITHOUT COMPLICATION: ICD-10-CM

## 2023-03-13 RX ORDER — ALBUTEROL SULFATE 90 UG/1
AEROSOL, METERED RESPIRATORY (INHALATION)
Qty: 6.7 G | Refills: 0 | Status: SHIPPED | OUTPATIENT
Start: 2023-03-13

## 2023-05-18 ENCOUNTER — TELEPHONE (OUTPATIENT)
Dept: FAMILY MEDICINE CLINIC | Facility: CLINIC | Age: 53
End: 2023-05-18

## 2023-05-18 NOTE — TELEPHONE ENCOUNTER
I cannot locate pts release of records document  I called the patient to see if it is possible that he signed the document through the insurance  If so, we will need them to send us the signed authorization

## 2023-05-18 NOTE — TELEPHONE ENCOUNTER
There has been a request from [employer] for pt's records going back 5 years  Please do so    Pt has signed authorization for release of records  Thanks

## 2023-05-18 NOTE — TELEPHONE ENCOUNTER
This has been sent over to [facility]

## 2023-05-19 DIAGNOSIS — J45.20 MILD INTERMITTENT ASTHMA WITHOUT COMPLICATION: ICD-10-CM

## 2023-05-19 DIAGNOSIS — I10 ESSENTIAL HYPERTENSION: ICD-10-CM

## 2023-05-19 DIAGNOSIS — I10 HYPERTENSION, UNSPECIFIED TYPE: ICD-10-CM

## 2023-05-19 RX ORDER — BENAZEPRIL HYDROCHLORIDE 40 MG/1
TABLET, FILM COATED ORAL
Qty: 90 TABLET | Refills: 0 | Status: SHIPPED | OUTPATIENT
Start: 2023-05-19

## 2023-05-19 RX ORDER — ATENOLOL 100 MG/1
TABLET ORAL
Qty: 90 TABLET | Refills: 0 | Status: SHIPPED | OUTPATIENT
Start: 2023-05-19

## 2023-05-19 RX ORDER — ALBUTEROL SULFATE 90 UG/1
AEROSOL, METERED RESPIRATORY (INHALATION)
Qty: 8.5 G | Refills: 3 | Status: SHIPPED | OUTPATIENT
Start: 2023-05-19

## 2023-06-03 DIAGNOSIS — R73.01 IMPAIRED FASTING GLUCOSE: ICD-10-CM

## 2023-06-03 DIAGNOSIS — I10 ESSENTIAL HYPERTENSION: ICD-10-CM

## 2023-06-03 DIAGNOSIS — R79.89 ABNORMAL LFTS: ICD-10-CM

## 2023-06-03 DIAGNOSIS — E78.00 HYPERCHOLESTEROLEMIA: ICD-10-CM

## 2023-06-03 RX ORDER — SILDENAFIL 50 MG/1
TABLET, FILM COATED ORAL
Qty: 8 TABLET | Refills: 6 | Status: SHIPPED | OUTPATIENT
Start: 2023-06-03

## 2023-06-03 RX ORDER — BENAZEPRIL HYDROCHLORIDE 40 MG/1
TABLET, FILM COATED ORAL
Qty: 90 TABLET | Refills: 0 | Status: SHIPPED | OUTPATIENT
Start: 2023-06-03

## 2023-08-16 DIAGNOSIS — I10 HYPERTENSION, UNSPECIFIED TYPE: ICD-10-CM

## 2023-08-16 RX ORDER — ATENOLOL 100 MG/1
100 TABLET ORAL DAILY
Qty: 8 TABLET | Refills: 0 | Status: SHIPPED | OUTPATIENT
Start: 2023-08-16

## 2023-08-16 NOTE — TELEPHONE ENCOUNTER
Sent in rx for # 8 on tenormin  As pt is very overdue for visit. Note on rx that pt needs visit prior to additional refills.   He has visit scheduled 8/24/2023

## 2023-09-07 DIAGNOSIS — I10 ESSENTIAL HYPERTENSION: ICD-10-CM

## 2023-09-08 NOTE — TELEPHONE ENCOUNTER
Refill on lotensin and other meds Rx thru our office not appropriate at this time  As pt is overdue for visits and labs  He has been notified on multiple occasions (documented) of need for above. Please kindly remind pt again - of need for fasting labs  This is to ensure safety and appropriateness of ongoing medication use  I see that he has an appt with kay  Please encourage him to keep this. Thanks.

## 2023-09-08 NOTE — TELEPHONE ENCOUNTER
Called pt left detailed VM regarding dr Kasey Telles message below. Pt was advised to call with questions or concerns.

## 2023-09-12 RX ORDER — BENAZEPRIL HYDROCHLORIDE 40 MG/1
TABLET, FILM COATED ORAL
Qty: 90 TABLET | Refills: 0 | Status: SHIPPED | OUTPATIENT
Start: 2023-09-12

## 2023-09-13 ENCOUNTER — OFFICE VISIT (OUTPATIENT)
Dept: OBGYN CLINIC | Facility: MEDICAL CENTER | Age: 53
End: 2023-09-13
Payer: COMMERCIAL

## 2023-09-13 VITALS — BODY MASS INDEX: 27.77 KG/M2 | HEIGHT: 70 IN | WEIGHT: 194 LBS

## 2023-09-13 DIAGNOSIS — M50.30 DEGENERATIVE CERVICAL DISC: ICD-10-CM

## 2023-09-13 DIAGNOSIS — R20.0 RIGHT ARM NUMBNESS: Primary | ICD-10-CM

## 2023-09-13 DIAGNOSIS — M54.12 RADICULOPATHY, CERVICAL REGION: ICD-10-CM

## 2023-09-13 PROCEDURE — 99204 OFFICE O/P NEW MOD 45 MIN: CPT | Performed by: EMERGENCY MEDICINE

## 2023-09-13 RX ORDER — METHYLPREDNISOLONE 4 MG/1
TABLET ORAL
Qty: 1 EACH | Refills: 0 | Status: SHIPPED | OUTPATIENT
Start: 2023-09-13
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
## 2023-09-13 NOTE — PATIENT INSTRUCTIONS
While taking the oral steroid Medrol Dose Pack, do not take any NSAIDs such as Advil, Motrin, ibuprofen, Motrin, Aleve, naproxen, Celebrex or Meloxicam.  You can restart the NSAIDs after you finish the steroids. However you may take Tylenol 500mg every 4-6 hours as needed OR max 1,000mg per dose up to 3 times per day for a total of 3,000mg per day  While taking oral steroids, you may experience mild side effects such as feeling jittery or flushing. Please call if your side effects are significant or you have any questions. THEN    You may use Advil (ibuprofen) 600mg every 6 hours or at least twice per day OR Aleve (naproxen) 250-500mg every 12 hours as needed for pain and inflammation. You may also take Tylenol 500mg every 4-6 hours as needed OR max 1,000mg per dose up to 3 times per day for a total of 3,000mg per day  Check with your primary care physician to see if these medications are safe to take and to make sure they do not interfere with your other medications and medical issues.

## 2023-09-17 DIAGNOSIS — I10 ESSENTIAL HYPERTENSION: ICD-10-CM

## 2023-09-17 RX ORDER — BENAZEPRIL HYDROCHLORIDE 40 MG/1
TABLET, FILM COATED ORAL
Qty: 90 TABLET | Refills: 0 | OUTPATIENT
Start: 2023-09-17

## 2023-10-25 ENCOUNTER — LAB (OUTPATIENT)
Dept: LAB | Facility: MEDICAL CENTER | Age: 53
End: 2023-10-25
Payer: COMMERCIAL

## 2023-10-25 DIAGNOSIS — E78.00 HYPERCHOLESTEROLEMIA: ICD-10-CM

## 2023-10-25 DIAGNOSIS — R80.9 PROTEINURIA, UNSPECIFIED TYPE: ICD-10-CM

## 2023-10-25 DIAGNOSIS — E83.52 HYPERCALCEMIA: ICD-10-CM

## 2023-10-25 DIAGNOSIS — R73.01 IMPAIRED FASTING GLUCOSE: ICD-10-CM

## 2023-10-25 DIAGNOSIS — I10 PRIMARY HYPERTENSION: ICD-10-CM

## 2023-10-25 LAB
25(OH)D3 SERPL-MCNC: 13.2 NG/ML (ref 30–100)
ALBUMIN SERPL BCP-MCNC: 4.1 G/DL (ref 3.5–5)
ALP SERPL-CCNC: 55 U/L (ref 34–104)
ALT SERPL W P-5'-P-CCNC: 99 U/L (ref 7–52)
ANION GAP SERPL CALCULATED.3IONS-SCNC: 13 MMOL/L
AST SERPL W P-5'-P-CCNC: 92 U/L (ref 13–39)
BACTERIA UR QL AUTO: NORMAL /HPF
BILIRUB SERPL-MCNC: 0.41 MG/DL (ref 0.2–1)
BILIRUB UR QL STRIP: NEGATIVE
BUN SERPL-MCNC: 10 MG/DL (ref 5–25)
CALCIUM SERPL-MCNC: 9.6 MG/DL (ref 8.4–10.2)
CHLORIDE SERPL-SCNC: 105 MMOL/L (ref 96–108)
CHOLEST SERPL-MCNC: 219 MG/DL
CLARITY UR: CLEAR
CO2 SERPL-SCNC: 30 MMOL/L (ref 21–32)
COLOR UR: ABNORMAL
CREAT SERPL-MCNC: 0.74 MG/DL (ref 0.6–1.3)
CREAT UR-MCNC: 150 MG/DL
EST. AVERAGE GLUCOSE BLD GHB EST-MCNC: 91 MG/DL
GFR SERPL CREATININE-BSD FRML MDRD: 106 ML/MIN/1.73SQ M
GLUCOSE P FAST SERPL-MCNC: 121 MG/DL (ref 65–99)
GLUCOSE UR STRIP-MCNC: NEGATIVE MG/DL
HBA1C MFR BLD: 4.8 %
HDLC SERPL-MCNC: 56 MG/DL
HGB UR QL STRIP.AUTO: NEGATIVE
KETONES UR STRIP-MCNC: NEGATIVE MG/DL
LEUKOCYTE ESTERASE UR QL STRIP: NEGATIVE
MAGNESIUM SERPL-MCNC: 1.9 MG/DL (ref 1.9–2.7)
NITRITE UR QL STRIP: NEGATIVE
NON-SQ EPI CELLS URNS QL MICRO: NORMAL /HPF
NONHDLC SERPL-MCNC: 163 MG/DL
PH UR STRIP.AUTO: 6 [PH]
PHOSPHATE SERPL-MCNC: 2.2 MG/DL (ref 2.7–4.5)
POTASSIUM SERPL-SCNC: 3.1 MMOL/L (ref 3.5–5.3)
PROT SERPL-MCNC: 7.3 G/DL (ref 6.4–8.4)
PROT UR STRIP-MCNC: ABNORMAL MG/DL
PROT UR-MCNC: 63 MG/DL
PROT/CREAT UR: 0.42 MG/G{CREAT} (ref 0–0.1)
PTH-INTACT SERPL-MCNC: 30.5 PG/ML (ref 12–88)
RBC #/AREA URNS AUTO: NORMAL /HPF
SODIUM SERPL-SCNC: 148 MMOL/L (ref 135–147)
SP GR UR STRIP.AUTO: 1.02 (ref 1–1.03)
TRIGL SERPL-MCNC: 531 MG/DL
UROBILINOGEN UR STRIP-ACNC: <2 MG/DL
WBC #/AREA URNS AUTO: NORMAL /HPF

## 2023-10-25 PROCEDURE — 83036 HEMOGLOBIN GLYCOSYLATED A1C: CPT

## 2023-10-25 PROCEDURE — 82570 ASSAY OF URINE CREATININE: CPT

## 2023-10-25 PROCEDURE — 83735 ASSAY OF MAGNESIUM: CPT

## 2023-10-25 PROCEDURE — 84100 ASSAY OF PHOSPHORUS: CPT

## 2023-10-25 PROCEDURE — 84156 ASSAY OF PROTEIN URINE: CPT

## 2023-10-25 PROCEDURE — 80053 COMPREHEN METABOLIC PANEL: CPT

## 2023-10-25 PROCEDURE — 82306 VITAMIN D 25 HYDROXY: CPT

## 2023-10-25 PROCEDURE — 80061 LIPID PANEL: CPT

## 2023-10-25 PROCEDURE — 83970 ASSAY OF PARATHORMONE: CPT

## 2023-10-25 PROCEDURE — 36415 COLL VENOUS BLD VENIPUNCTURE: CPT

## 2023-10-26 ENCOUNTER — TELEPHONE (OUTPATIENT)
Dept: NEPHROLOGY | Facility: CLINIC | Age: 53
End: 2023-10-26

## 2023-10-26 NOTE — RESULT ENCOUNTER NOTE
Patient has been lost to follow-up last seen in January needed a 4-month follow-up please advise patient received some labs vitamin D consistently low will need to be on supplements. Need to schedule patient for follow-up in the office so that supplementation can be prescribed plus his serum sodium level is running on the higher side he needs to increase his free water intake as well as foods it potassium since his potassium is running low. He would definitely benefit from follow-up in the office.

## 2023-10-26 NOTE — TELEPHONE ENCOUNTER
I left a very detailed message for America Alonso advising that we need him to schedule a follow up appointment in order to prescribe him the necessary medication needed at this time. Per  he has not been seen since January and requires a f/u before we can give any medications . - request he drink more water throughout the day as sodium is elevate. - Potassium is low , increase potassium with food intake.

## 2023-10-26 NOTE — TELEPHONE ENCOUNTER
----- Message from Osiel Muñiz MD sent at 10/26/2023  8:03 AM EDT -----  Patient has been lost to follow-up last seen in January needed a 4-month follow-up please advise patient received some labs vitamin D consistently low will need to be on supplements. Need to schedule patient for follow-up in the office so that supplementation can be prescribed plus his serum sodium level is running on the higher side he needs to increase his free water intake as well as foods it potassium since his potassium is running low. He would definitely benefit from follow-up in the office.

## 2023-10-27 ENCOUNTER — OFFICE VISIT (OUTPATIENT)
Dept: OBGYN CLINIC | Facility: MEDICAL CENTER | Age: 53
End: 2023-10-27
Payer: COMMERCIAL

## 2023-10-27 ENCOUNTER — APPOINTMENT (OUTPATIENT)
Dept: RADIOLOGY | Facility: MEDICAL CENTER | Age: 53
End: 2023-10-27
Payer: COMMERCIAL

## 2023-10-27 ENCOUNTER — OFFICE VISIT (OUTPATIENT)
Dept: FAMILY MEDICINE CLINIC | Facility: CLINIC | Age: 53
End: 2023-10-27
Payer: COMMERCIAL

## 2023-10-27 VITALS
DIASTOLIC BLOOD PRESSURE: 110 MMHG | WEIGHT: 194 LBS | SYSTOLIC BLOOD PRESSURE: 180 MMHG | BODY MASS INDEX: 27.77 KG/M2 | HEIGHT: 70 IN | HEART RATE: 101 BPM

## 2023-10-27 VITALS
SYSTOLIC BLOOD PRESSURE: 180 MMHG | HEIGHT: 70 IN | DIASTOLIC BLOOD PRESSURE: 110 MMHG | OXYGEN SATURATION: 95 % | BODY MASS INDEX: 28.75 KG/M2 | TEMPERATURE: 98.2 F | WEIGHT: 200.8 LBS | HEART RATE: 101 BPM | RESPIRATION RATE: 12 BRPM

## 2023-10-27 DIAGNOSIS — F41.9 ANXIETY: ICD-10-CM

## 2023-10-27 DIAGNOSIS — M54.12 RADICULOPATHY, CERVICAL REGION: ICD-10-CM

## 2023-10-27 DIAGNOSIS — F32.0 CURRENT MILD EPISODE OF MAJOR DEPRESSIVE DISORDER, UNSPECIFIED WHETHER RECURRENT (HCC): ICD-10-CM

## 2023-10-27 DIAGNOSIS — H91.93 DECREASED HEARING OF BOTH EARS: ICD-10-CM

## 2023-10-27 DIAGNOSIS — R20.0 RIGHT ARM NUMBNESS: ICD-10-CM

## 2023-10-27 DIAGNOSIS — R20.0 RIGHT ARM NUMBNESS: Primary | ICD-10-CM

## 2023-10-27 DIAGNOSIS — J45.20 MILD INTERMITTENT ASTHMA WITHOUT COMPLICATION: ICD-10-CM

## 2023-10-27 DIAGNOSIS — J30.1 ALLERGIC RHINITIS DUE TO POLLEN, UNSPECIFIED SEASONALITY: ICD-10-CM

## 2023-10-27 DIAGNOSIS — K22.70 BARRETT'S ESOPHAGUS WITHOUT DYSPLASIA: ICD-10-CM

## 2023-10-27 DIAGNOSIS — Z80.0 FAMILY HISTORY OF LYNCH SYNDROME: ICD-10-CM

## 2023-10-27 DIAGNOSIS — E78.00 HYPERCHOLESTEROLEMIA: ICD-10-CM

## 2023-10-27 DIAGNOSIS — I10 PRIMARY HYPERTENSION: ICD-10-CM

## 2023-10-27 DIAGNOSIS — R73.01 IMPAIRED FASTING GLUCOSE: ICD-10-CM

## 2023-10-27 DIAGNOSIS — M50.30 DEGENERATIVE CERVICAL DISC: ICD-10-CM

## 2023-10-27 DIAGNOSIS — Z23 ENCOUNTER FOR IMMUNIZATION: Primary | ICD-10-CM

## 2023-10-27 DIAGNOSIS — E66.3 OVERWEIGHT: ICD-10-CM

## 2023-10-27 DIAGNOSIS — R79.89 ELEVATED LIVER FUNCTION TESTS: ICD-10-CM

## 2023-10-27 LAB — SL AMB POCT HEMOGLOBIN AIC: 5.3 (ref ?–6.5)

## 2023-10-27 PROCEDURE — 90686 IIV4 VACC NO PRSV 0.5 ML IM: CPT

## 2023-10-27 PROCEDURE — 99214 OFFICE O/P EST MOD 30 MIN: CPT | Performed by: PHYSICIAN ASSISTANT

## 2023-10-27 PROCEDURE — 99213 OFFICE O/P EST LOW 20 MIN: CPT | Performed by: EMERGENCY MEDICINE

## 2023-10-27 PROCEDURE — 90471 IMMUNIZATION ADMIN: CPT

## 2023-10-27 PROCEDURE — 72040 X-RAY EXAM NECK SPINE 2-3 VW: CPT

## 2023-10-27 PROCEDURE — 83036 HEMOGLOBIN GLYCOSYLATED A1C: CPT | Performed by: PHYSICIAN ASSISTANT

## 2023-10-27 NOTE — ASSESSMENT & PLAN NOTE
Currently mild. Notes upcoming change with moving into own apartment next week. Will monitor for now. Depression Screening Follow-up Plan: Patient's depression screening was positive with a PHQ-2 score of . Their PHQ-9 score was 9. Patient assessed for underlying major depression. They have no active suicidal ideations. Brief counseling provided and recommend additional follow-up/re-evaluation next office visit.

## 2023-10-27 NOTE — PROGRESS NOTES
FAMILY PRACTICE OFFICE VISIT  [facility] - Formerly [facility], [facility]       NAME: Moreno Trent  AGE: 48 y.o. SEX: male       : 1970        MRN: [id number]    DATE: 10/27/2023  TIME: 11:52 AM    Assessment and Plan     Problem List Items Addressed This Visit          Digestive    Muro's esophagus     On Nexium 40 mg twice daily but still having symptoms in the morning. Encouraged patient to reach out to GI. He is soon due for his 2 year EGD recall but might be able to get rechecked sooner with current symptoms. Endocrine    Impaired fasting glucose     His A1c today is normal at 5.3%. Limit carb intake. We will continue to monitor. Relevant Orders    POCT hemoglobin A1c (Completed)       Respiratory    Allergic rhinitis     Reports recent sneezing. Encouraged to restart Flonase daily which he reports normally helps. Asthma, mild intermittent       Cardiovascular and Mediastinum    Hypertension     Patient has not taken his medication in days. He was encouraged to restart his medication and start checking BP at home. He is to reach out with readings in a few weeks. He will also be scheduling follow-up with Nephrology soon. Other    Hypercholesterolemia     Will try taking Crestor in the morning to see if it helps with consistency of use. Limit fat intake. Recheck lipid panel prior to next visit. Relevant Orders    Lipid Panel with Direct LDL reflex    Anxiety     Currently mild. Will monitor. Elevated liver function tests     Reviewed recent results. Encouraged to continue decreasing and ideally working on discontinuing alcohol intake. Follow-up with GI and will continue to monitor as well. Overweight     BMI Counseling: Body mass index is 28.81 kg/m². The BMI is above normal. Nutrition recommendations include decreasing overall calorie intake and reducing intake of saturated fat and trans fat.  Exercise recommendations include exercising 3-5 times per week. Current mild episode of major depressive disorder (HCC)     Currently mild. Notes upcoming change with moving into own apartment next week. Will monitor for now. Depression Screening Follow-up Plan: Patient's depression screening was positive with a PHQ-2 score of . Their PHQ-9 score was 9. Patient assessed for underlying major depression. They have no active suicidal ideations. Brief counseling provided and recommend additional follow-up/re-evaluation next office visit. Family history of Barakat syndrome     Following with GI. Will soon needs repeat colonoscopy. Decreased hearing of both ears     Referred to ENT/audiology. Relevant Orders    Ambulatory Referral to Otolaryngology     Other Visit Diagnoses       Encounter for immunization    -  Primary    Relevant Orders    influenza vaccine, quadrivalent, 0.5 mL, preservative-free, for adult and pediatric patients 6 mos+ (AFLURIA, FLUARIX, FLULAVAL, FLUZONE) (Completed)            Muro's esophagus  On Nexium 40 mg twice daily but still having symptoms in the morning. Encouraged patient to reach out to GI. He is soon due for his 2 year EGD recall but might be able to get rechecked sooner with current symptoms. Impaired fasting glucose  His A1c today is normal at 5.3%. Limit carb intake. We will continue to monitor. Allergic rhinitis  Reports recent sneezing. Encouraged to restart Flonase daily which he reports normally helps. Hypertension  Patient has not taken his medication in days. He was encouraged to restart his medication and start checking BP at home. He is to reach out with readings in a few weeks. He will also be scheduling follow-up with Nephrology soon. Anxiety  Currently mild. Will monitor. Current mild episode of major depressive disorder (HCC)  Currently mild. Notes upcoming change with moving into own apartment next week.  Will monitor for now.    Depression Screening Follow-up Plan: Patient's depression screening was positive with a PHQ-2 score of . Their PHQ-9 score was 9. Patient assessed for underlying major depression. They have no active suicidal ideations. Brief counseling provided and recommend additional follow-up/re-evaluation next office visit. Elevated liver function tests  Reviewed recent results. Encouraged to continue decreasing and ideally working on discontinuing alcohol intake. Follow-up with GI and will continue to monitor as well. Family history of Barakat syndrome  Following with GI. Will soon needs repeat colonoscopy. Hypercholesterolemia  Will try taking Crestor in the morning to see if it helps with consistency of use. Limit fat intake. Recheck lipid panel prior to next visit. Overweight  BMI Counseling: Body mass index is 28.81 kg/m². The BMI is above normal. Nutrition recommendations include decreasing overall calorie intake and reducing intake of saturated fat and trans fat. Exercise recommendations include exercising 3-5 times per week. Decreased hearing of both ears  Referred to ENT/audiology. Chief Complaint     Chief Complaint   Patient presents with    Follow-up     Pt received flu shot in left deltoid. Pt tolerated injection well. History of Present Illness   Ericka Bro is a 48y.o.-year-old male who presents for follow-up on chronic conditions.      Hypertension-on amlodipine 10 mg daily, atenolol 100 mg daily, benazepril 40 mg daily, and furosemide 20 mg daily - but forgot to take meds over the last several days    Hyperlipidemia - on rosuvastatin 40 mg daily but only takes it about 4 times weekly since it is at night - last LDL was unable to be calculated due to triglyceride level of 531 this week    Muro's esophagus - on Nexium 40 mg twice daily but having symptoms still upon waking - last EGD was in 2/2022 (needs 2 yr recall) - sees [facility]    Asthma-uses albuterol as needed (not recently) - has been having allergies lately with a lot of sneezing but not using Flonase     IFG- A1c today is 5.3%    MDD/anxiety - notes that he is stressed - has some work stress and 20 yr anniversary of dad's passing     Vitamin D deficiency - not supplementing-level this week was 13.2- aware that he needs to f/u with Nephro    LFT - AST 92 and ALT 99 this week - trying to drinks on weekends (Fri-Sun) - has about 2 mixed drinks or 3 beers and 2 shots     Patient reports that an exit physical from last job in March - notes that he was told that he has trouble with decreased hearing - notes that he is noticing self asking people to repeat more often           Review of Systems   Review of Systems   Constitutional:  Negative for chills and fever. HENT:  Negative for rhinorrhea and sore throat. Eyes:  Negative for visual disturbance. Respiratory:  Negative for cough, shortness of breath and wheezing. Cardiovascular:  Negative for chest pain, palpitations and leg swelling. Gastrointestinal:  Negative for abdominal pain, constipation, diarrhea, nausea and vomiting. Endocrine: Negative for polydipsia and polyuria. Genitourinary:  Negative for dysuria. Musculoskeletal:  Negative for arthralgias and myalgias. Skin:  Negative for rash. Neurological:  Negative for dizziness and headaches. Hematological:  Does not bruise/bleed easily. Psychiatric/Behavioral:  Negative for dysphoric mood. The patient is not nervous/anxious.         Active Problem List     Patient Active Problem List   Diagnosis    Allergic rhinitis    Muro's esophagus    Asthma, mild intermittent    Hematuria    Hiatal hernia    Hypercholesterolemia    Hypertension    Impaired fasting glucose    Sarcoidosis    Sleep apnea    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    Anxiety    Elevated liver function tests    Arm paresthesia, left    Alcohol screening    Overweight    Fatigue    Current mild episode of major depressive disorder (HCC)    Depression, recurrent (HCC)    Hyperbilirubinemia    Hypercalcemia    Renal insufficiency    Family history of Barakat syndrome    Hyperproteinemia    Decreased hearing of both ears         Past Medical History:  Past Medical History:   Diagnosis Date    Chest pain     Sarcoidosis        Past Surgical History:  Past Surgical History:   Procedure Laterality Date    BRONCHOSCOPY      COLONOSCOPY      complete     CYSTOSCOPY      Diagnostic - Neg     ESOPHAGOGASTRODUODENOSCOPY      Diagnostic        Family History:  Family History   Problem Relation Age of Onset    Colon cancer Mother         age in 35s    Hyperlipidemia Mother     Hyperlipidemia Father     Pancreatic cancer Father         Adenocarcinoma of the ampulla of vater     Colon cancer Maternal Uncle     Colon cancer Other         maternal relatives - likely MGF and a second cousin in her 35s       Social History:  Social History     Socioeconomic History    Marital status:      Spouse name: Not on file    Number of children: Not on file    Years of education: Not on file    Highest education level: Not on file   Occupational History    Occupation:     Tobacco Use    Smoking status: Former     Types: Cigarettes     Quit date:      Years since quittin.8    Smokeless tobacco: Former    Tobacco comments:     smoked about 1 pack every 2 weeks for 2 years in his 21's    Vaping Use    Vaping Use: Never used   Substance and Sexual Activity    Alcohol use: Yes     Comment: reports down to 5 drinks once weekly    Drug use: No    Sexual activity: Not on file   Other Topics Concern    Not on file   Social History Narrative    Lives home with wife and daughter      Social Determinants of Health     Financial Resource Strain: Not on file   Food Insecurity: Not on file   Transportation Needs: Not on file   Physical Activity: Not on file   Stress: Not on file   Social Connections: Not on file   Intimate Partner Violence: Not on file   Housing Stability: Not on file       Objective     Vitals:    10/27/23 1004 10/27/23 1137   BP: (!) 178/100 (!) 180/110   BP Location: Left arm    Patient Position: Sitting    Cuff Size: Large    Pulse: 101    Resp: 12    Temp: 98.2 °F (36.8 °C)    TempSrc: Temporal    SpO2: 95%    Weight: 91.1 kg (200 lb 12.8 oz)    Height: 5' 10" (1.778 m)      Wt Readings from Last 3 Encounters:   10/27/23 91.1 kg (200 lb 12.8 oz)   10/27/23 88 kg (194 lb)   09/13/23 88 kg (194 lb)       Physical Exam  Vitals reviewed. Constitutional:       General: He is not in acute distress. Appearance: Normal appearance. He is well-developed. HENT:      Head: Normocephalic and atraumatic. Right Ear: Hearing, tympanic membrane, ear canal and external ear normal.      Left Ear: Hearing, tympanic membrane, ear canal and external ear normal.      Nose: Nose normal.      Mouth/Throat:      Pharynx: Uvula midline. Eyes:      General: Lids are normal.      Conjunctiva/sclera: Conjunctivae normal.      Pupils: Pupils are equal, round, and reactive to light. Neck:      Thyroid: No thyroid mass or thyromegaly. Vascular: No carotid bruit. Trachea: Trachea normal.   Cardiovascular:      Rate and Rhythm: Normal rate and regular rhythm. Pulses: Normal pulses. Radial pulses are 2+ on the right side and 2+ on the left side. Posterior tibial pulses are 2+ on the right side and 2+ on the left side. Heart sounds: Normal heart sounds, S1 normal and S2 normal. No murmur heard. Pulmonary:      Effort: Pulmonary effort is normal.      Breath sounds: Normal breath sounds. No decreased breath sounds, wheezing, rhonchi or rales. Abdominal:      General: Bowel sounds are normal. There is no distension. Palpations: Abdomen is soft. There is no hepatomegaly or splenomegaly. Tenderness: There is no abdominal tenderness. Hernia: No hernia is present.    Musculoskeletal: Cervical back: Neck supple. Lymphadenopathy:      Cervical: No cervical adenopathy. Skin:     General: Skin is warm and dry. Findings: No rash. Neurological:      Mental Status: He is alert. Sensory: No sensory deficit. Psychiatric:         Speech: Speech normal.         Behavior: Behavior normal.         Pertinent Laboratory/Diagnostic Studies:  Lab Results   Component Value Date    GLUCOSE 99 07/25/2014    BUN 10 10/25/2023    CREATININE 0.74 10/25/2023    CALCIUM 9.6 10/25/2023     07/25/2014    K 3.1 (L) 10/25/2023    CO2 30 10/25/2023     10/25/2023     Lab Results   Component Value Date    ALT 99 (H) 10/25/2023    AST 92 (H) 10/25/2023    ALKPHOS 55 10/25/2023    BILITOT 0.66 07/25/2014       Lab Results   Component Value Date    WBC 8.01 09/22/2022    HGB 15.7 09/22/2022    HCT 46.0 09/22/2022    MCV 95 09/22/2022     09/22/2022     Lab Results   Component Value Date    CHOL 239 07/25/2014     Lab Results   Component Value Date    TRIG 531 (H) 10/25/2023     Lab Results   Component Value Date    HDL 56 10/25/2023     Lab Results   Component Value Date    LDLCALC  10/25/2023      Comment:      Calculated LDL invalid, triglycerides >400 mg/dl  This screening LDL is a calculated result. It does not have the accuracy of the Direct Measured LDL in the monitoring of patients with hyperlipidemia and/or statin therapy. Direct Measure LDL (DWI648) must be ordered separately in these patients.      Lab Results   Component Value Date    HGBA1C 5.3 10/27/2023       Results for orders placed or performed in visit on 10/27/23   POCT hemoglobin A1c   Result Value Ref Range    Hemoglobin A1C 5.3 6.5         ALLERGIES:  Allergies   Allergen Reactions    Other      seasonal       Current Medications     Current Outpatient Medications   Medication Sig Dispense Refill    albuterol (PROVENTIL HFA,VENTOLIN HFA) 90 mcg/act inhaler TAKE 2 PUFFS BY MOUTH EVERY 6 HOURS AS NEEDED FOR COUGH, WHEEZE, OR FOR SHORTNESS OF BREATH 8.5 g 3    amLODIPine (NORVASC) 10 mg tablet TAKE 1 TABLET BY MOUTH EVERY DAY 90 tablet 0    atenolol (TENORMIN) 100 mg tablet Take 1 tablet (100 mg total) by mouth daily Pt needs visit prior to additional refills 8 tablet 0    benazepril (LOTENSIN) 40 MG tablet TAKE 1 TABLET BY MOUTH EVERY DAY 30 tablet 0    esomeprazole (NexIUM) 40 MG capsule Take 40 mg by mouth 2 (two) times a day    0    fluticasone (FLONASE) 50 mcg/act nasal spray 1 spray into each nostril daily as needed for rhinitis      Milk Thistle 250 MG CAPS Take 2 caplets by mouth daily      rosuvastatin (CRESTOR) 40 MG tablet TAKE 1 TABLET BY MOUTH EVERY DAY 30 tablet 0    sildenafil (VIAGRA) 50 MG tablet TAKE 1 TABLET BY MOUTH DAILY AS NEEDED FOR ERECTILE DYSFUNCTION TAKE 30-60 MIN PRIOR TO SEXUAL ACTIVITY DO NOT TAKE MORE THAN 1 TAB IN 24 HOURS. 8 tablet 6    Blood Glucose Monitoring Suppl (FreeStyle Freedom Lite) w/Device KIT Use to test 2x daily, 2hours after a meal (Patient not taking: Reported on 10/27/2023) 1 each 1    furosemide (LASIX) 20 mg tablet Take 1 tablet (20 mg total) by mouth daily 90 tablet 3    glucose blood (FREESTYLE LITE) test strip USE AS INSTRUCTED WITH GLUCOMETER TO TEST BLOOD GLUCOSE (Patient not taking: Reported on 10/27/2023) 100 each 1    Lancets (freestyle) lancets USE TO TEST BLOOD SUGARSS 2X DAILY 2 HOURS AFTER MEALS (Patient not taking: Reported on 10/27/2023) 100 each 0     No current facility-administered medications for this visit.          Health Maintenance     Health Maintenance   Topic Date Due    Annual Physical  12/08/2023    COVID-19 Vaccine (3 - Esther Ridgel series) 01/27/2024 (Originally 4/29/2021)    Colorectal Cancer Screening  02/21/2024    Depression Remission PHQ  04/27/2024    BMI: Followup Plan  10/27/2024    BMI: Adult  10/27/2024    DTaP,Tdap,and Td Vaccines (3 - Td or Tdap) 01/11/2027    HIV Screening  Completed    Hepatitis C Screening  Completed    Pneumococcal Vaccine: Pediatrics (0 to 5 Years) and At-Risk Patients (6 to 59 Years)  Completed    Influenza Vaccine  Completed    HIB Vaccine  Aged Out    IPV Vaccine  Aged Out    Hepatitis A Vaccine  Aged Out    Meningococcal ACWY Vaccine  Aged Out    HPV Vaccine  Aged Out     Immunization History   Administered Date(s) Administered    COVID-19 MODERNA VACC 0.5 ML IM 02/04/2021, 03/04/2021    INFLUENZA 10/12/2018, 12/02/2021, 09/22/2022    Influenza Injectable, MDCK, Preservative Free, Quadrivalent, 0.5 mL 10/10/2020    Influenza Quadrivalent Preservative Free 3 years and older IM 10/01/2017    Influenza, injectable, quadrivalent, preservative free 0.5 mL 10/27/2023    Influenza, recombinant, quadrivalent,injectable, preservative free 09/06/2019, 09/22/2022    Influenza, seasonal, injectable 01/02/2007, 11/08/2015    Pneumococcal Conjugate Vaccine 20-valent (Pcv20), Polysace 12/08/2022    Pneumococcal Polysaccharide PPV23 12/16/2020    Tdap 11/30/2015, 01/11/2017    Zoster Vaccine Recombinant 12/08/2022       Leslie Sosa PA-C  10/27/2023 11:52 AM  [facility]

## 2023-10-27 NOTE — ASSESSMENT & PLAN NOTE
Patient has not taken his medication in days. He was encouraged to restart his medication and start checking BP at home. He is to reach out with readings in a few weeks. He will also be scheduling follow-up with Nephrology soon.

## 2023-10-27 NOTE — ASSESSMENT & PLAN NOTE
Reviewed recent results. Encouraged to continue decreasing and ideally working on discontinuing alcohol intake. Follow-up with GI and will continue to monitor as well.
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
## 2023-10-27 NOTE — ASSESSMENT & PLAN NOTE
BMI Counseling: Body mass index is 28.81 kg/m². The BMI is above normal. Nutrition recommendations include decreasing overall calorie intake and reducing intake of saturated fat and trans fat. Exercise recommendations include exercising 3-5 times per week.

## 2023-10-27 NOTE — ASSESSMENT & PLAN NOTE
On Nexium 40 mg twice daily but still having symptoms in the morning. Encouraged patient to reach out to GI. He is soon due for his 2 year EGD recall but might be able to get rechecked sooner with current symptoms.

## 2023-10-27 NOTE — ASSESSMENT & PLAN NOTE
Will try taking Crestor in the morning to see if it helps with consistency of use. Limit fat intake. Recheck lipid panel prior to next visit.

## 2023-10-30 ENCOUNTER — EVALUATION (OUTPATIENT)
Dept: PHYSICAL THERAPY | Facility: MEDICAL CENTER | Age: 53
End: 2023-10-30
Payer: COMMERCIAL

## 2023-10-30 DIAGNOSIS — M54.12 RADICULOPATHY, CERVICAL REGION: Primary | ICD-10-CM

## 2023-10-30 DIAGNOSIS — M50.30 DEGENERATIVE CERVICAL DISC: ICD-10-CM

## 2023-10-30 DIAGNOSIS — R20.0 RIGHT ARM NUMBNESS: ICD-10-CM

## 2023-10-30 PROCEDURE — 97161 PT EVAL LOW COMPLEX 20 MIN: CPT
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
## 2023-11-10 ENCOUNTER — TELEPHONE (OUTPATIENT)
Dept: NEPHROLOGY | Facility: CLINIC | Age: 53
End: 2023-11-10

## 2023-11-10 ENCOUNTER — OFFICE VISIT (OUTPATIENT)
Dept: PHYSICAL THERAPY | Facility: MEDICAL CENTER | Age: 53
End: 2023-11-10
Payer: COMMERCIAL

## 2023-11-10 DIAGNOSIS — M54.12 RADICULOPATHY, CERVICAL REGION: Primary | ICD-10-CM

## 2023-11-10 DIAGNOSIS — R20.0 RIGHT ARM NUMBNESS: ICD-10-CM

## 2023-11-10 DIAGNOSIS — M50.30 DEGENERATIVE CERVICAL DISC: ICD-10-CM

## 2023-11-10 PROCEDURE — 97140 MANUAL THERAPY 1/> REGIONS: CPT

## 2023-11-10 NOTE — TELEPHONE ENCOUNTER
Pt called office to schedule overdue follow up, I advised pt I will send message to Dr. Jazmín Esquivel to see where we can schedule pt.

## 2023-11-10 NOTE — PROGRESS NOTES
Daily Note     Today's date: 11/10/2023  Patient name: Gabriel Roca  : 1970  MRN: [id number]  Referring provider: Po Flores MD  Dx:   Encounter Diagnosis     ICD-10-CM    1. Radiculopathy, cervical region  M54.12       2. Right arm numbness  R20.0       3. Degenerative cervical disc  M50.30                      Subjective: Pt reports that his symptoms are more frequent now. He reports that he was moving stuff over the weekend and changed his work chair. Objective: See treatment diary below      Assessment: POC initiated. No adverse effects noted. Tolerated treatment well. Patient demonstrated fatigue post treatment, exhibited good technique with therapeutic exercises, and would benefit from continued PT      Plan: Continue per plan of care.       Precautions: none    HEP: median n glide, scap retraction  Manuals 11/10            UPAs Jh R C2-6            SOR JH            1st rib mob [facility] TRIP            Thoracic mob [facility]            Neuro Re-Ed             Scap 4             No monies 2x15 rtb            Band pull apart 2x15 rtb            Nerve glides                                                    Ther Ex             ube [id number]            Open book             Thor ext  x20            1st rib self mob x20                                                                Ther Activity                                       Gait Training                                       Modalities             traction

## 2023-11-14 ENCOUNTER — OFFICE VISIT (OUTPATIENT)
Dept: NEPHROLOGY | Facility: CLINIC | Age: 53
End: 2023-11-14
Payer: COMMERCIAL

## 2023-11-14 VITALS
OXYGEN SATURATION: 95 % | HEART RATE: 79 BPM | SYSTOLIC BLOOD PRESSURE: 140 MMHG | BODY MASS INDEX: 28.63 KG/M2 | WEIGHT: 200 LBS | DIASTOLIC BLOOD PRESSURE: 86 MMHG | HEIGHT: 70 IN

## 2023-11-14 DIAGNOSIS — D86.9 SARCOIDOSIS: ICD-10-CM

## 2023-11-14 DIAGNOSIS — I10 PRIMARY HYPERTENSION: ICD-10-CM

## 2023-11-14 DIAGNOSIS — R80.9 PROTEINURIA, UNSPECIFIED TYPE: ICD-10-CM

## 2023-11-14 DIAGNOSIS — E55.9 VITAMIN D DEFICIENCY: ICD-10-CM

## 2023-11-14 DIAGNOSIS — R80.1 PERSISTENT PROTEINURIA: ICD-10-CM

## 2023-11-14 DIAGNOSIS — E83.52 HYPERCALCEMIA: ICD-10-CM

## 2023-11-14 DIAGNOSIS — I10 ESSENTIAL HYPERTENSION: ICD-10-CM

## 2023-11-14 DIAGNOSIS — R79.89 ELEVATED LIVER FUNCTION TESTS: ICD-10-CM

## 2023-11-14 DIAGNOSIS — E78.00 HYPERCHOLESTEROLEMIA: ICD-10-CM

## 2023-11-14 DIAGNOSIS — N18.2 CKD (CHRONIC KIDNEY DISEASE) STAGE 2, GFR 60-89 ML/MIN: Primary | ICD-10-CM

## 2023-11-14 PROBLEM — N28.9 RENAL INSUFFICIENCY: Status: RESOLVED | Noted: 2022-09-22 | Resolved: 2023-11-14

## 2023-11-14 PROCEDURE — 99214 OFFICE O/P EST MOD 30 MIN: CPT | Performed by: INTERNAL MEDICINE

## 2023-11-14 RX ORDER — ERGOCALCIFEROL 1.25 MG/1
50000 CAPSULE ORAL WEEKLY
Qty: 12 CAPSULE | Refills: 0 | Status: SHIPPED | OUTPATIENT
Start: 2023-11-14

## 2023-11-14 RX ORDER — BENAZEPRIL HYDROCHLORIDE 40 MG/1
40 TABLET, FILM COATED ORAL DAILY
Qty: 90 TABLET | Refills: 3 | Status: SHIPPED | OUTPATIENT
Start: 2023-11-14

## 2023-11-14 RX ORDER — FUROSEMIDE 20 MG/1
20 TABLET ORAL 3 TIMES WEEKLY
Qty: 90 TABLET | Refills: 3 | Status: SHIPPED | OUTPATIENT
Start: 2023-11-15

## 2023-11-14 NOTE — PATIENT INSTRUCTIONS
- get blood work in 2 weeks and call with updates  - decrease lasix to 20mg three times a week  - get blood work in [facility]  - start ergocalciferol 74264 units once a week      - Please call me in 10 days after having your blood work done to review the results if you do not hear back from me or my office, as I may have not received the results. - please remember to perform blood work prior to the next visit. - Please call if the blood pressure top number is greater than 140 or less than 110 consistently. - Please call if you are gaining more than 2lbs in 2 days for adjustment of water pills.  ~ Please AVOID the following pain medications. LIST OF NSAIDS (NONSTEROIDAL ANTI-INFLAMMATORY DRUGS) AND FRY-2 INHIBITORS    DIFLUNISAL (DOLOBID)  IBUPROFEN (MOTRIN, ADVIL)  FLURBIPROFEN (ANSAID)  KETOPROFEN (ORUDIS, ORUVAIL)  FENOPROFEN (NALFON)  NABUMETONE (RELAFEN)  PIROXICAM (FELDENE)  NAPROXEN (ALEVE, NAPROSYN, NAPRELAN, ANAPROX)  DICLOFENAC (VOLTAREN, CATAFLAM)  INDOMETHACIN (INDOCIN)  SULINDAC (CLINORIL)  TOLMETIN (TOLETIN)  ETODOLAC (LODINE)  MELOXICAM (MOBIC)  KETOROLAC (TORADOL)  OXAPROZIN (DAYPRO)  CELECOXIB (CELEBREX)    Follow a moderate potassium diet. 2 Gram Low Sodium Diet     A 2 gram sodium diet restricts the amount of sodium in the diet to no more than 2 g or 2000 mg daily. Limiting the amount of sodium is often used to help lower blood pressure. It is important if you have heart, liver, or kidney problems. Many foods contain sodium for flavor and sometimes as a preservative. When the amount of sodium in a diet needs to be low, it is important to know what to look for when choosing foods and drinks. The following includes some information and guidelines to help make it easier for you to adapt to a low sodium diet. QUICK TIPS     Do not add salt to food. Avoid convenience items and fast food. Choose unsalted snack foods.    Buy lower sodium products, often labeled as "lower sodium" or "no salt added."   Check food labels to learn how much sodium is in 1 serving. When eating at a restaurant, ask that your food be prepared with less salt or none, if possible. READING FOOD LABELS FOR SODIUM INFORMATION     The nutrition facts label is a good place to find how much sodium is in foods. Look for products with no more than 500 to 600 mg of sodium per meal and no more than 150 mg per serving. Remember that 2 g = 2000 mg. The food label may also list foods as:   Sodium-free: Less than 5 mg in a serving. Very low sodium: 35 mg or less in a serving. Low-sodium: 140 mg or less in a serving. Light in sodium: 50% less sodium in a serving. For example, if a food that usually has 300 mg of sodium is changed to become light in sodium, it will have 150 mg of sodium. Reduced sodium: 25% less sodium in a serving. For example, if a food that usually has 400 mg of sodium is changed to reduced sodium, it will have 300 mg of sodium. CHOOSING FOODS     Grains     Avoid: Salted crackers and snack items. Some cereals, including instant hot cereals. Bread stuffing and biscuit mixes. Seasoned rice or pasta mixes. Choose: Unsalted snack items. Low-sodium cereals, oats, puffed wheat and rice, shredded wheat. English muffins and bread. Pasta. Meats     Avoid: Salted, canned, smoked, spiced, pickled meats, including fish and poultry. [place], ham, sausage, cold cuts, hot dogs, anchovies. Choose: Low-sodium canned tuna and salmon. Fresh or frozen meat, poultry, and fish. Dairy   Avoid: Processed cheese and spreads. Cottage cheese. Buttermilk and condensed milk. Regular cheese. Choose: Milk. Low-sodium cottage cheese. Yogurt. Sour cream. Low-sodium cheese. Fruits and Vegetables     Avoid: Regular canned vegetables. Regular canned tomato sauce and paste. Frozen vegetables in sauces. Joao Xiong. Dilcia Fox. Relishes. Sauerkraut. Choose: Low-sodium canned vegetables. Low-sodium tomato sauce and paste.  Frozen or fresh vegetables. Fresh and frozen fruit. Condiments     Avoid: Canned and packaged gravies. Worcestershire sauce. Tartar sauce. Barbecue sauce. Soy sauce. Steak sauce. Ketchup. Onion, garlic, and table salt. Meat flavorings and tenderizers. Choose: Fresh and dried herbs and spices. Low-sodium varieties of mustard and ketchup. Lemon juice. Tabasco sauce. Horseradish.     SAMPLE: 2 GRAM SODIUM MEAL PLAN     Breakfast / Sodium (mg)   1 cup low-fat milk / 687 mg   2 slices whole-wheat toast / 270 mg   1 tbs heart-healthy margarine / 153 mg   1 hard-boiled egg / 139 mg   1 small orange / 0 mg    Lunch / Sodium (mg)   1 cup raw carrots / 76 mg   ½ cup hummus / 298 mg   1 cup low-fat milk / 143 mg   ½ cup red grapes / 2 mg   1 whole-wheat maury bread / 356 mg    Dinner / Sodium (mg)   1 cup whole-wheat pasta / 2 mg   1 cup low-sodium tomato sauce / 73 mg   3 oz lean ground beef / 57 mg   1 small side salad (1 cup raw spinach leaves, ½ cup cucumber, ¼ cup yellow bell pepper) with 1 tsp olive oil and 1 tsp red wine vinegar / 25 mg    Snack / Sodium (mg)   1 container low-fat vanilla yogurt / 107 mg   3 roxana cracker squares / 127 mg    Nutrient Analysis   Calories: 2033   Protein: 77 g   Carbohydrate: 282 g   Fat: 72 g   Sodium: 1971 mg

## 2023-11-14 NOTE — PROGRESS NOTES
Nephrology Follow up Consultation  Odalys Ewing 48 y.o. male MRN: [id number]   ? BACKGROUND:  Odalys Ewing is a 48 y.o.male who was referred by Elizabeth Garcia DO for evaluation of Follow-up and Hypertension  . ASSESSMENT / PLAN:   48 y.o.  male with pmh of multiple co-morbidities including hypertension (x 20yrs), asthma, hepatic steatosis, ELEONORA on CPAP, anxiety, GERD, hyperlipidemia, vitamin D deficiency and sarcoidosis (dx 2007) presents to the office for teen follow-up    CKD stage I/II:  - After review of records in [facility] as well as Care everywhere patient has a baseline creatinine of 0.9-1.3 mg/dL from 2014 up until December 2021. Most recent labs show a Creatinine of 0.74 mg/dL on 10/25/2023. Renal function remains stable and below baseline. Get blood work in 2 weeks  - Status post episode of acute kidney injury in July 2022 with a peak creatinine 1.82 mg/dL on 7/22/2022. - Likely has underlying CKD secondary to hypertensive nephrosclerosis plus age-related nephron loss plus prior episode of acute kidney injury nondialysis requiring plus at risk for CKD due to chronic alcohol usage. -SPEP from July 2022 within normal limits. - At risk for renal sarcoidosis. No acute indication for renal biopsy at this time  - Abdominal ultrasound from April 2022 showing bilateral kidneys approximately 12 cm no hydronephrosis no masses. - Acid base and lytes stable except for mild hypernatremia and hypokalemia advised patient to increase free water intake and adjust potassium in his diet recheck blood work in 2 weeks. - Clinically the patient appears to be euvolemic minimally hypovolemic have patient decrease Lasix to 20 mg p.o. 3 times a week  - Recommend to avoid use of NSAIDs, nephrotoxins. Caution advised with regards to exposure to IV contrast dye.   - Discussed with the patient in depth his renal status, including the possible etiologies for CKD.    - Advised the patient that when his GFR is close to 20mL/min then will start discussing about RRT(renal replacement therapy) options such as renal transplant, peritoneal dialysis and hemodialysis. - Informed the patient about the various options for Renal Replacement therapy. - Discussed with the patient how we need to work together to delay the progression of CKD with optimal BP control based on their age and co-morbidities, optimal BS control with HbA1c of <7% and trying to reduce proteinuria by the use of anti-proteinuric agents. Resistant hypertension:  - Patient is on Norvasc 10 mg p.o. daily, atenolol 100 mg p.o. daily, benazepril 40 mg p.o. daily, lasix 20mg po Q24   -Advised patient to decrease Lasix to 20 mg 3 times a week for now check blood work in 2 weeks and call with updates in 2 weeks  -The previous visit HCTZ was discontinued secondary to issues with hypercalcemia. Calcium appears to be normal afterwards. - Goal BP of <  130/80 based on age and comorbidities  - Instructed to follow low sodium (2gm)diet.  - Advised to hold ACEI/ARBs if patient suffers from dehydration due to gastrointestinal losses due to risk of DEANNE secondary to failure to autoregulate. Hemoglobin:  - Goal Hb of 10-12 g/dL  - Most recent labs suggestive of 15.7 g/dL. -No role for IV iron     MBD(Mineral Bone Disease)/vitamin D deficiency:  - Based on patients CKD stage following is the goal of therapy. - Maintain calcium phosphorus product of < 55.  -Most recent vitamin D level 13.2 and intact PTH of 30.5 as of 10/25/2023  -Ionized calcium within normal limits  -Start ergocalciferol 50,000 units p.o. weekly for 12 weeks for now  -Check vitamin D level in 6 months    Proteinuria:  - most recent UA from September 2022 positive ketones positive protein no RBCs.   - check protein creatinine ratio, UA  -Most recent protein creatinine ratio 420 mg as of 10/25/2023  -Check microalbumin to Creatinine ratio prior to next visit  - currently on therapy for proteinuria with benazepril, vitamin D, Crestor    Lipids:  -On Crestor,   - goal LDL less than 70  - Management as per PCP    Impaired glucose tolerance/GERD:  - management as per Primary team  - on no medications at this time diet controlled for his glucose intolerance  - Discussed with patient with regards to if possible to switch over from PPI to Zantac or Pepcid due to association of long-term use of PPI with CKD and certain recent studies. Unfortunately his acid reflux is so severe he is not able to tolerate switching. ELEONORA/history of sarcoidosis:  - Management as per primary team  -Follow-up with pulmonary last seen 11/23/2022.  -As per patient he was diagnosed with sarcoidosis in 2007 did not get any treatment for that. Routinely follows up with ophthalmology for eye checks. Elevated LFTs:  -Likely secondary to alcohol usage however advised patient to follow-up with PCP and GI. Could also be secondary to underlying sarcoidosis. Improving    Nutrition:  - Encouraged patient to follow a renal diet comprising of moderate potassium, low phosphorus and protein restriction to 0.8gm/kg. - Will check serum albumin with next blood work. Followup:  - Patient is to follow-up in 12 months, with lab work to be performed in 2 weeks and then again in 6 month and then again in a few days prior to the next visit. Advised patient to call me in 10 days to review the results if they do not hear back from me, as I may have not received the results. Adriane Treviño MD, [facility], 11/14/2023, 10:51 AM             SUBJECTIVE: 48 y.o. male presents to the office for routine follow-up. Feels well needs a refill for his benazepril. Doing okay with Lasix but not drinking much. Has a new job and hence was not able to come for previous appointments. Thankful for the care information that is gone today no NSAID use agrees with medication changes and getting blood work in 2 weeks and hydrating himself.       Review of Systems Constitutional:  Negative for chills and fever. HENT:  Negative for congestion and sore throat. Respiratory:  Negative for cough, shortness of breath and wheezing. Cardiovascular:  Negative for leg swelling. Gastrointestinal:  Negative for constipation and diarrhea. Genitourinary:  Negative for difficulty urinating, dysuria and hematuria. Musculoskeletal:  Negative for back pain. Neurological:  Negative for dizziness, light-headedness and headaches. Psychiatric/Behavioral:  Negative for agitation and confusion. All other systems reviewed and are negative. 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   • Anxiety   • Elevated liver function tests   • Arm paresthesia, left   • Alcohol screening   • Overweight   • Fatigue   • Current mild episode of major depressive disorder (HCC)   • Depression, recurrent (HCC)   • Hyperbilirubinemia   • Family history of Barakat syndrome   • Hyperproteinemia   • Decreased hearing of both ears   • CKD (chronic kidney disease) stage 2, GFR 60-89 ml/min   • Vitamin D deficiency   • Persistent proteinuria       PAST SURGICAL HISTORY:  Past Surgical History:   Procedure Laterality Date   • BRONCHOSCOPY     • COLONOSCOPY      complete    • CYSTOSCOPY      Diagnostic - Neg 2007   • ESOPHAGOGASTRODUODENOSCOPY      Diagnostic        SOCIAL HISTORY :   reports that he quit smoking about 24 years ago. His smoking use included cigarettes. He has quit using smokeless tobacco. He reports current alcohol use. He reports that he does not use drugs.     FAMILY HISTORY:  Family History Problem Relation Age of Onset   • Colon cancer Mother         age in 35s   • Hyperlipidemia Mother    • Hyperlipidemia Father    • Pancreatic cancer Father         Adenocarcinoma of the ampulla of vater    • Colon cancer Maternal Uncle    • Colon cancer Other         maternal relatives - likely MGF and a second cousin in her 35s       ALLERGIES:  Allergies   Allergen Reactions   • Other      seasonal           PHYSICAL EXAM:  Vitals:    11/14/23 1029   BP: 140/86   BP Location: Left arm   Patient Position: Sitting   Cuff Size: Adult   Pulse: 79   SpO2: 95%   Weight: 90.7 kg (200 lb)   Height: 5' 10" (1.778 m)     Body mass index is 28.7 kg/m². Physical Exam  Vitals reviewed. Constitutional:       General: He is not in acute distress. Appearance: Normal appearance. He is normal weight. He is not ill-appearing, toxic-appearing or diaphoretic. HENT:      Head: Normocephalic and atraumatic. Mouth/Throat:      Mouth: Mucous membranes are dry. Pharynx: No oropharyngeal exudate. Eyes:      General: No scleral icterus. Cardiovascular:      Rate and Rhythm: Normal rate. Pulmonary:      Effort: No respiratory distress. Breath sounds: Normal breath sounds. No stridor. Abdominal:      General: There is no distension. Palpations: Abdomen is soft. There is no mass. Tenderness: There is no abdominal tenderness. There is no right CVA tenderness or left CVA tenderness. Musculoskeletal:         General: No swelling. Cervical back: Normal range of motion. No rigidity. Skin:     General: Skin is dry. Coloration: Skin is not jaundiced. Neurological:      General: No focal deficit present. Mental Status: He is alert and oriented to person, place, and time. Mental status is at baseline.    Psychiatric:         Mood and Affect: Mood normal.         Behavior: Behavior normal.         LABORATORY DATA:     Results from last 6 Months   Lab Units 10/25/23  0837   POTASSIUM mmol/L 3.1*   CHLORIDE mmol/L 105   CO2 mmol/L 30   BUN mg/dL 10   CREATININE mg/dL 0.74   CALCIUM mg/dL 9.6   MAGNESIUM mg/dL 1.9   PHOSPHORUS mg/dL 2.2*          rest all reviewed    RADIOLOGY:  No orders to display     Rest all reviewed        MEDICATIONS:    Current Outpatient Medications:   •  albuterol (PROVENTIL HFA,VENTOLIN HFA) 90 mcg/act inhaler, TAKE 2 PUFFS BY MOUTH EVERY 6 HOURS AS NEEDED FOR COUGH, WHEEZE, OR FOR SHORTNESS OF BREATH, Disp: 8.5 g, Rfl: 3  •  amLODIPine (NORVASC) 10 mg tablet, TAKE 1 TABLET BY MOUTH EVERY DAY, Disp: 90 tablet, Rfl: 0  •  atenolol (TENORMIN) 100 mg tablet, Take 1 tablet (100 mg total) by mouth daily Pt needs visit prior to additional refills, Disp: 8 tablet, Rfl: 0  •  benazepril (LOTENSIN) 40 MG tablet, Take 1 tablet (40 mg total) by mouth daily, Disp: 90 tablet, Rfl: 3  •  ergocalciferol (ERGOCALCIFEROL) 1.25 MG (42377 UT) capsule, Take 1 capsule (50,000 Units total) by mouth once a week, Disp: 12 capsule, Rfl: 0  •  esomeprazole (NexIUM) 40 MG capsule, Take 40 mg by mouth 2 (two) times a day  , Disp: , Rfl: 0  •  fluticasone (FLONASE) 50 mcg/act nasal spray, 1 spray into each nostril daily as needed for rhinitis, Disp: , Rfl:   •  [START ON 11/15/2023] furosemide (LASIX) 20 mg tablet, Take 1 tablet (20 mg total) by mouth 3 (three) times a week, Disp: 90 tablet, Rfl: 3  •  Milk Thistle 250 MG CAPS, Take 2 caplets by mouth daily, Disp: , Rfl:   •  rosuvastatin (CRESTOR) 40 MG tablet, TAKE 1 TABLET BY MOUTH EVERY DAY, Disp: 30 tablet, Rfl: 0  •  sildenafil (VIAGRA) 50 MG tablet, TAKE 1 TABLET BY MOUTH DAILY AS NEEDED FOR ERECTILE DYSFUNCTION TAKE 30-60 MIN PRIOR TO SEXUAL ACTIVITY DO NOT TAKE MORE THAN 1 TAB IN 24 HOURS., Disp: 8 tablet, Rfl: 6  •  Blood Glucose Monitoring Suppl (FreeStyle Freedom Lite) w/Device KIT, Use to test 2x daily, 2hours after a meal (Patient not taking: Reported on 10/27/2023), Disp: 1 each, Rfl: 1  •  glucose blood (FREESTYLE LITE) test strip, USE AS INSTRUCTED WITH GLUCOMETER TO TEST BLOOD GLUCOSE (Patient not taking: Reported on 10/27/2023), Disp: 100 each, Rfl: 1  •  Lancets (freestyle) lancets, USE TO TEST BLOOD SUGARSS 2X DAILY 2 HOURS AFTER MEALS (Patient not taking: Reported on 10/27/2023), Disp: 100 each, Rfl: 0          Portions of the record may have been created with voice recognition software. Occasional wrong word or "sound a like" substitutions may have occurred due to the inherent limitations of voice recognition software. Read the chart carefully and recognize, using context, where substitutions have occurred. If you have any questions, please contact the dictating provider.

## 2023-11-15 ENCOUNTER — APPOINTMENT (OUTPATIENT)
Dept: PHYSICAL THERAPY | Facility: MEDICAL CENTER | Age: 53
End: 2023-11-15
Payer: COMMERCIAL

## 2023-11-21 ENCOUNTER — APPOINTMENT (OUTPATIENT)
Dept: PHYSICAL THERAPY | Facility: MEDICAL CENTER | Age: 53
End: 2023-11-21
Payer: COMMERCIAL

## 2023-12-08 ENCOUNTER — OFFICE VISIT (OUTPATIENT)
Dept: OBGYN CLINIC | Facility: MEDICAL CENTER | Age: 53
End: 2023-12-08
Payer: COMMERCIAL

## 2023-12-08 VITALS
WEIGHT: 194 LBS | HEART RATE: 78 BPM | BODY MASS INDEX: 27.77 KG/M2 | SYSTOLIC BLOOD PRESSURE: 138 MMHG | DIASTOLIC BLOOD PRESSURE: 90 MMHG | HEIGHT: 70 IN

## 2023-12-08 DIAGNOSIS — R20.0 RIGHT ARM NUMBNESS: ICD-10-CM

## 2023-12-08 DIAGNOSIS — M54.12 RADICULOPATHY, CERVICAL REGION: Primary | ICD-10-CM

## 2023-12-08 DIAGNOSIS — M50.30 DEGENERATIVE CERVICAL DISC: ICD-10-CM

## 2023-12-08 PROCEDURE — 99212 OFFICE O/P EST SF 10 MIN: CPT | Performed by: EMERGENCY MEDICINE
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
## 2024-01-07 DIAGNOSIS — E78.00 HYPERCHOLESTEROLEMIA: ICD-10-CM

## 2024-01-07 DIAGNOSIS — R79.89 ABNORMAL LFTS: ICD-10-CM

## 2024-01-07 DIAGNOSIS — R73.01 IMPAIRED FASTING GLUCOSE: ICD-10-CM

## 2024-01-07 DIAGNOSIS — I10 ESSENTIAL HYPERTENSION: ICD-10-CM

## 2024-01-07 RX ORDER — SILDENAFIL 50 MG/1
TABLET, FILM COATED ORAL
Qty: 8 TABLET | Refills: 6 | Status: SHIPPED | OUTPATIENT
Start: 2024-01-07

## 2024-04-01 NOTE — ASSESSMENT & PLAN NOTE
Patient called wound clinic stating he has a new wound and would like to see Dr. Nikolai VELASQUEZ.     Looked into the schedule for the next available extended follow up opening. Next opening is next week Friday however patient is going out of town in 10 days and would like to be seen before then.     Message forward to RN -Brain to speak to provider and get scheduled.    The patient feels that his anxiety has improved since restarting back on Zoloft  He will continue with Zoloft 50 milligrams daily  He will continue to work on limiting his alcohol intake, but he was encouraged to discontinue drinking altogether  He will continue to go to his gamblers anonymous meetings but states he does not need to go to the alcoholics anonymous meetings

## 2024-04-17 NOTE — PATIENT INSTRUCTIONS
Hypertension  We reviewed that his blood pressure is elevated today  He seems to be having some mild elevations at home as well  He has decided to continue for now with the atenolol 100 mg daily, benazepril 40 mg twice daily, and hydrochlorothiazide 25 mg daily  He will make some changes with his lifestyle choices and discontinues drinking alcohol, cut back on salt intake, and start exercising regularly  He will continue to monitor his blood pressure at home and let me know if he is finding that his blood pressure is becoming any further elevated  Otherwise he will follow back in 1 month and we will reassess at that time  If he is still having elevations of his blood pressure, we will need to add additional medication for control  Abnormal LFTs    We discussed that he had elevations of to liver function tests that were nearly double the upper limits of normal   He was urged to discontinue using alcohol to help with these readings  He will also remain off of the statin which he stopped last week  He was given a slip to recheck his liver function test prior to his next visit in a month  Anxiety    Anxiety has not been well controlled recently  He has been off of the Zoloft since last week  We discussed that the 25 mg was not likely the dose that was required at that time  He will restart Zoloft 25 mg daily for the next 2 weeks and then increase to 50 mg daily  We will reassess at his next visit  He should call with any problems or concerns in the interim  He was encouraged to also continue with meetings with can was anonymous and alcoholics anonymous  He is not currently interested in seeing a therapist at this time  Hypercholesterolemia    We reviewed his previous cholesterol readings  He did have significant improvement with the statin; however, he will remain off of the statin for this time due to the liver function enzyme elevations    He was encouraged to limit fatty foods such as red meat, fried food, butter, and she is  He was also encouraged to restart exercising regularly  We will plan on rechecking this in several months once his liver enzymes are normal to further discuss whether he can restart taking a statin  17-Apr-2024 18:17

## 2024-04-29 ENCOUNTER — OFFICE VISIT (OUTPATIENT)
Dept: FAMILY MEDICINE CLINIC | Facility: CLINIC | Age: 54
End: 2024-04-29

## 2024-04-29 VITALS
WEIGHT: 201 LBS | OXYGEN SATURATION: 96 % | TEMPERATURE: 97.4 F | HEIGHT: 70 IN | SYSTOLIC BLOOD PRESSURE: 138 MMHG | BODY MASS INDEX: 28.77 KG/M2 | RESPIRATION RATE: 20 BRPM | HEART RATE: 102 BPM | DIASTOLIC BLOOD PRESSURE: 78 MMHG

## 2024-04-29 DIAGNOSIS — R79.89 ELEVATED LIVER FUNCTION TESTS: ICD-10-CM

## 2024-04-29 DIAGNOSIS — E55.9 VITAMIN D DEFICIENCY: ICD-10-CM

## 2024-04-29 DIAGNOSIS — K22.70 BARRETT'S ESOPHAGUS WITHOUT DYSPLASIA: ICD-10-CM

## 2024-04-29 DIAGNOSIS — J30.1 ALLERGIC RHINITIS DUE TO POLLEN, UNSPECIFIED SEASONALITY: ICD-10-CM

## 2024-04-29 DIAGNOSIS — F32.0 CURRENT MILD EPISODE OF MAJOR DEPRESSIVE DISORDER, UNSPECIFIED WHETHER RECURRENT (HCC): ICD-10-CM

## 2024-04-29 DIAGNOSIS — E78.00 HYPERCHOLESTEROLEMIA: ICD-10-CM

## 2024-04-29 DIAGNOSIS — J45.20 MILD INTERMITTENT ASTHMA WITHOUT COMPLICATION: ICD-10-CM

## 2024-04-29 DIAGNOSIS — F41.9 ANXIETY: ICD-10-CM

## 2024-04-29 DIAGNOSIS — I10 PRIMARY HYPERTENSION: Primary | ICD-10-CM

## 2024-04-29 PROCEDURE — 99214 OFFICE O/P EST MOD 30 MIN: CPT | Performed by: PHYSICIAN ASSISTANT

## 2024-04-29 NOTE — PROGRESS NOTES
FAMILY PRACTICE OFFICE VISIT  [facility] - [facility]IMARY [facility]ARE       NAME: Moreno Trent  AGE: 54 y.o. SEX: male       : 1970        MRN: [id number]    DATE: 2024  TIME: 4:22 PM    Assessment and Plan     Problem List Items Addressed This Visit          Cardiovascular and Mediastinum    Hypertension - Primary     Controlled today.  Continue amlodipine 10 mg daily, atenolol 100 mg daily, benazepril 40 mg daily, and furosemide 20 mg daily.            Respiratory    Allergic rhinitis     Continue albuterol and Flonase PRN.         Asthma, mild intermittent     Continue albuterol as needed.            Digestive    Muro's esophagus     On Nexium 40 mg daily.  Continue to follow with [facility].  Encouraged to reschedule EGD that was canceled.            Behavioral Health    Anxiety     Worse with recent job loss. Wishes to monitor.  Recheck next visit.         Current mild episode of major depressive disorder (HCC)     Worse with recent job loss. Wishes to monitor.  Recheck next visit.            Other    Hypercholesterolemia     Not taking his statin. Will recheck with lab orders as reprinted.          Elevated liver function tests     Recheck with labs as reprinted.         Vitamin D deficiency     Not currently supplementing.  Recheck level as ordered by Nephrology.             Hypercholesterolemia  Not taking his statin. Will recheck with lab orders as reprinted.     Hypertension  Controlled today.  Continue amlodipine 10 mg daily, atenolol 100 mg daily, benazepril 40 mg daily, and furosemide 20 mg daily.    Allergic rhinitis  Continue albuterol and Flonase PRN.    Asthma, mild intermittent  Continue albuterol as needed.    Muro's esophagus  On Nexium 40 mg daily.  Continue to follow with [facility].  Encouraged to reschedule EGD that was canceled.    Anxiety  Worse with recent job loss. Wishes to monitor.  Recheck next visit.    Current mild episode of major depressive  disorder (HCC)  Worse with recent job loss. Wishes to monitor.  Recheck next visit.    Elevated liver function tests  Recheck with labs as reprinted.    Vitamin D deficiency  Not currently supplementing.  Recheck level as ordered by Nephrology.               Chief Complaint     Chief Complaint   Patient presents with    Follow-up     6 months follow up       History of Present Illness   Moreno Trent is a 54 y.o.-year-old male who presents for follow-up on chronic conditions.     Hypertension-on amlodipine 10 mg daily, atenolol 100 mg daily, benazepril 40 mg daily, and furosemide 20 mg daily - usually 120s-130s/high 80s - low 90s at home     Hyperlipidemia-on rosuvastatin 40 mg daily-last LDL was unable to be calculated due to triglyceride level of 531 in October-repeat not yet done - denies eating friend food but eats late - eats more frequent snack/meals throughout the day - morning is usually yogurt with blueberries, toast and eggs, or bagel with cream cheese -- lunch is cold cut sandwich with chips or leftovers -- dinner is chicken parm, salmon, pasta and veggie like green beans, asparagus or salad - drinks mostly water     Muro's esophagus - on Nexium 40 mg once daily - confirms having acid reflux in the morning - follows with [facility] - notes that he was supposed to get EGD last month but he cancelled (will reschedule once gets new job)     Asthma/allergies - uses albuterol (weekly) and Flonase as needed    IFG - last A1c was 5.3% in October    MDD/anxiety - has been feeling down with losing his job     Vitamin D deficiency - not supplementing - level in October was 13.2    LFT - AST 92 and ALT 99 in October            Review of Systems   Review of Systems   Constitutional:  Negative for chills and fever.   Respiratory:  Negative for shortness of breath.    Cardiovascular:  Negative for chest pain, palpitations and leg swelling.   Neurological:  Negative for dizziness and headaches.       Active Problem List      Patient Active Problem List   Diagnosis    Allergic rhinitis    Muro's esophagus    Asthma, mild intermittent    Hematuria    Hiatal hernia    Hypercholesterolemia    Hypertension    Impaired fasting glucose    Sarcoidosis    Sleep apnea    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    Anxiety    Elevated liver function tests    Arm paresthesia, left    Alcohol screening    Overweight    Fatigue    Current mild episode of major depressive disorder (HCC)    Depression, recurrent (HCC)    Hyperbilirubinemia    Family history of Barakat syndrome    Hyperproteinemia    Decreased hearing of both ears    CKD (chronic kidney disease) stage 2, GFR 60-89 ml/min    Vitamin D deficiency    Persistent proteinuria         Past Medical History:  Past Medical History:   Diagnosis Date    Chest pain     Hypercholesterolemia     Hypertension     Sarcoidosis        Past Surgical History:  Past Surgical History:   Procedure Laterality Date    BRONCHOSCOPY      COLONOSCOPY      complete     CYSTOSCOPY      Diagnostic - Neg     ESOPHAGOGASTRODUODENOSCOPY      Diagnostic        Family History:  Family History   Problem Relation Age of Onset    Colon cancer Mother         age in 30s    Hyperlipidemia Mother     Hyperlipidemia Father     Pancreatic cancer Father         Adenocarcinoma of the ampulla of vater     Colon cancer Maternal Uncle     Colon cancer Other         maternal relatives - likely MGF and a second cousin in her 30s       Social History:  Social History     Socioeconomic History    Marital status:      Spouse name: Not on file    Number of children: Not on file    Years of education: Not on file    Highest education level: Not on file   Occupational History    Occupation:     Tobacco Use    Smoking status: Former     Current packs/day: 0.00     Types: Cigarettes     Quit date:      Years since quittin.3    Smokeless tobacco: Former    Tobacco comments:     smoked  "about 1 pack every 2 weeks for 2 years in his 20's    Vaping Use    Vaping status: Never Used   Substance and Sexual Activity    Alcohol use: Yes     Comment: reports down to 5 drinks once weekly    Drug use: No    Sexual activity: Not on file   Other Topics Concern    Not on file   Social History Narrative    Lives home with wife and daughter      Social Determinants of Health     Financial Resource Strain: Not on file   Food Insecurity: Not on file   Transportation Needs: Not on file   Physical Activity: Not on file   Stress: Not on file   Social Connections: Not on file   Intimate Partner Violence: Not on file   Housing Stability: Not on file       Objective     Vitals:    04/29/24 0959   BP: 138/78   BP Location: Left arm   Patient Position: Sitting   Cuff Size: Standard   Pulse: 102   Resp: 20   Temp: (!) 97.4 °F (36.3 °C)   TempSrc: Tympanic   SpO2: 96%   Weight: 91.2 kg (201 lb)   Height: 5' 10\" (1.778 m)     Wt Readings from Last 3 Encounters:   04/29/24 91.2 kg (201 lb)   12/08/23 88 kg (194 lb)   12/07/23 88.9 kg (196 lb)       Physical Exam  Vitals reviewed.   Constitutional:       General: He is not in acute distress.     Appearance: Normal appearance. He is well-developed. He is not ill-appearing.   HENT:      Head: Normocephalic and atraumatic.   Neck:      Thyroid: No thyromegaly.      Vascular: No carotid bruit.   Cardiovascular:      Rate and Rhythm: Normal rate and regular rhythm.      Pulses: Normal pulses.      Heart sounds: Normal heart sounds. No murmur heard.  Pulmonary:      Effort: Pulmonary effort is normal.      Breath sounds: Normal breath sounds. No wheezing, rhonchi or rales.   Musculoskeletal:      Cervical back: Neck supple.      Right lower leg: No edema.      Left lower leg: No edema.   Lymphadenopathy:      Cervical: No cervical adenopathy.   Skin:     General: Skin is warm and dry.   Neurological:      Mental Status: He is alert.   Psychiatric:         Mood and Affect: Mood " normal.         Behavior: Behavior normal.         Thought Content: Thought content normal.         Judgment: Judgment normal.         Pertinent Laboratory/Diagnostic Studies:  Lab Results   Component Value Date    GLUCOSE 99 07/25/2014    BUN 10 10/25/2023    CREATININE 0.74 10/25/2023    CALCIUM 9.6 10/25/2023     07/25/2014    K 3.1 (L) 10/25/2023    CO2 30 10/25/2023     10/25/2023     Lab Results   Component Value Date    ALT 99 (H) 10/25/2023    AST 92 (H) 10/25/2023    ALKPHOS 55 10/25/2023    BILITOT 0.66 07/25/2014       Lab Results   Component Value Date    WBC 8.01 09/22/2022    HGB 15.7 09/22/2022    HCT 46.0 09/22/2022    MCV 95 09/22/2022     09/22/2022     Lab Results   Component Value Date    CHOL 239 07/25/2014     Lab Results   Component Value Date    TRIG 531 (H) 10/25/2023     Lab Results   Component Value Date    HDL 56 10/25/2023     Lab Results   Component Value Date    LDLCALC  10/25/2023      Comment:      Calculated LDL invalid, triglycerides >400 mg/dl  This screening LDL is a calculated result.   It does not have the accuracy of the Direct Measured LDL in the monitoring of patients with hyperlipidemia and/or statin therapy.   Direct Measure LDL (TDS365) must be ordered separately in these patients.     Lab Results   Component Value Date    HGBA1C 5.3 10/27/2023       Results for orders placed or performed in visit on 10/27/23   POCT hemoglobin A1c   Result Value Ref Range    Hemoglobin A1C 5.3 6.5             ALLERGIES:  Allergies   Allergen Reactions    Other      seasonal       Current Medications     Current Outpatient Medications   Medication Sig Dispense Refill    albuterol (PROVENTIL HFA,VENTOLIN HFA) 90 mcg/act inhaler TAKE 2 PUFFS BY MOUTH EVERY 6 HOURS AS NEEDED FOR COUGH, WHEEZE, OR FOR SHORTNESS OF BREATH 8.5 g 3    amLODIPine (NORVASC) 10 mg tablet TAKE 1 TABLET BY MOUTH EVERY DAY 90 tablet 0    atenolol (TENORMIN) 100 mg tablet Take 1 tablet (100 mg total)  by mouth daily Pt needs visit prior to additional refills 8 tablet 0    benazepril (LOTENSIN) 40 MG tablet Take 1 tablet (40 mg total) by mouth daily 90 tablet 3    ergocalciferol (ERGOCALCIFEROL) 1.25 MG (72833 UT) capsule Take 1 capsule (50,000 Units total) by mouth once a week 12 capsule 0    esomeprazole (NexIUM) 40 MG capsule Take 40 mg by mouth 2 (two) times a day    0    fluticasone (FLONASE) 50 mcg/act nasal spray 1 spray into each nostril daily as needed for rhinitis      furosemide (LASIX) 20 mg tablet Take 1 tablet (20 mg total) by mouth 3 (three) times a week 90 tablet 3    Milk Thistle 250 MG CAPS Take 2 caplets by mouth daily      rosuvastatin (CRESTOR) 40 MG tablet TAKE 1 TABLET BY MOUTH EVERY DAY 30 tablet 0    sildenafil (VIAGRA) 50 MG tablet TAKE 1 TABLET BY MOUTH DAILY AS NEEDED FOR ERECTILE DYSFUNCTION TAKE 30-60 MIN PRIOR TO SEXUAL ACTIVITY DO NOT TAKE MORE THAN 1 TAB IN 24 HOURS. 8 tablet 6    Blood Glucose Monitoring Suppl (FreeStyle Freedom Lite) w/Device KIT Use to test 2x daily, 2hours after a meal (Patient not taking: Reported on 10/27/2023) 1 each 1    glucose blood (FREESTYLE LITE) test strip USE AS INSTRUCTED WITH GLUCOMETER TO TEST BLOOD GLUCOSE (Patient not taking: Reported on 10/27/2023) 100 each 1    Lancets (freestyle) lancets USE TO TEST BLOOD SUGARSS 2X DAILY 2 HOURS AFTER MEALS (Patient not taking: Reported on 10/27/2023) 100 each 0     No current facility-administered medications for this visit.         Health Maintenance     Health Maintenance   Topic Date Due    Depression Follow-up Plan  Never done    Zoster Vaccine (2 of 2) 02/02/2023    COVID-19 Vaccine (5 - 2023-24 season) 09/01/2023    PT PLAN OF CARE  11/29/2023    Annual Physical  12/08/2023    Colorectal Cancer Screening  02/21/2024    Depression Screening  04/29/2025    DTaP,Tdap,and Td Vaccines (3 - Td or Tdap) 01/11/2027    HIV Screening  Completed    Hepatitis C Screening  Completed    Pneumococcal Vaccine:  Pediatrics (0 to 5 Years) and At-Risk Patients (6 to 64 Years)  Completed    Influenza Vaccine  Completed    HIB Vaccine  Aged Out    IPV Vaccine  Aged Out    Hepatitis A Vaccine  Aged Out    Meningococcal ACWY Vaccine  Aged Out    HPV Vaccine  Aged Out     Immunization History   Administered Date(s) Administered    COVID-19 MODERNA VACC 0.5 ML IM 02/04/2021, 03/04/2021, 11/05/2021, 07/15/2022    INFLUENZA 10/12/2018, 12/02/2021, 09/22/2022    Influenza Injectable, MDCK, Preservative Free, Quadrivalent, 0.5 mL 10/10/2020    Influenza Quadrivalent Preservative Free 3 years and older IM 10/01/2017    Influenza, injectable, quadrivalent, preservative free 0.5 mL 10/27/2023    Influenza, recombinant, quadrivalent,injectable, preservative free 09/06/2019, 09/22/2022    Influenza, seasonal, injectable 01/02/2007, 11/08/2015    Pneumococcal Conjugate Vaccine 20-valent (Pcv20), Polysace 12/08/2022    Pneumococcal Polysaccharide PPV23 12/16/2020    Tdap 11/30/2015, 01/11/2017    Zoster Vaccine Recombinant 12/08/2022       Dorcas Bonilla PA-C  5/7/2024 4:22 PM  [facility]

## 2024-05-07 NOTE — ASSESSMENT & PLAN NOTE
On Nexium 40 mg daily.  Continue to follow with [facility].  Encouraged to reschedule EGD that was canceled.

## 2024-05-07 NOTE — ASSESSMENT & PLAN NOTE
Controlled today.  Continue amlodipine 10 mg daily, atenolol 100 mg daily, benazepril 40 mg daily, and furosemide 20 mg daily.

## 2024-09-15 ENCOUNTER — OFFICE VISIT (OUTPATIENT)
Dept: URGENT CARE | Facility: MEDICAL CENTER | Age: 54
End: 2024-09-15
Payer: COMMERCIAL

## 2024-09-15 VITALS
HEART RATE: 103 BPM | TEMPERATURE: 98.2 F | DIASTOLIC BLOOD PRESSURE: 98 MMHG | RESPIRATION RATE: 18 BRPM | SYSTOLIC BLOOD PRESSURE: 153 MMHG | OXYGEN SATURATION: 92 %

## 2024-09-15 DIAGNOSIS — R05.1 ACUTE COUGH: Primary | ICD-10-CM

## 2024-09-15 PROCEDURE — S9083 URGENT CARE CENTER GLOBAL: HCPCS | Performed by: PHYSICIAN ASSISTANT

## 2024-09-15 PROCEDURE — G0382 LEV 3 HOSP TYPE B ED VISIT: HCPCS | Performed by: PHYSICIAN ASSISTANT

## 2024-09-15 RX ORDER — PREDNISONE 10 MG/1
TABLET ORAL
Qty: 18 TABLET | Refills: 0 | Status: SHIPPED | OUTPATIENT
Start: 2024-09-15

## 2024-09-15 NOTE — PROGRESS NOTES
[facility]        NAME: Moreno Trent is a 54 y.o. male  : 1970    MRN: [id number]  DATE: September 15, 2024  TIME: 2:06 PM    Assessment and Plan   Acute cough [R05.1]  1. Acute cough  predniSONE 10 mg tablet            Patient Instructions       Follow up with PCP as needed.  Increase fluids.  Recheck blood pressure when feeling better.    If tests have been performed at [facility], our office will contact you with results if changes need to be made to the care plan discussed with you at the visit.  [facility]    Chief Complaint     Chief Complaint   Patient presents with    Cough     Patient c/o cough and nasal congestion x 6 days          History of Present Illness       He did check for COVID which was negative.    Cough  This is a new problem. The current episode started in the past 7 days. The problem has been unchanged. The problem occurs every few minutes. The cough is Non-productive. Associated symptoms include ear congestion, nasal congestion, postnasal drip and rhinorrhea. Pertinent negatives include no chest pain, chills, ear pain, fever, headaches, heartburn, hemoptysis, myalgias, rash, sore throat, shortness of breath, sweats, weight loss or wheezing. Nothing aggravates the symptoms. He has tried nothing for the symptoms.       Review of Systems   Review of Systems   Constitutional:  Negative for chills, fever and weight loss.   HENT:  Positive for postnasal drip and rhinorrhea. Negative for ear pain and sore throat.    Respiratory:  Positive for cough. Negative for hemoptysis, shortness of breath and wheezing.    Cardiovascular:  Negative for chest pain.   Gastrointestinal:  Negative for heartburn.   Musculoskeletal:  Negative for myalgias.   Skin:  Negative for rash.   Neurological:  Negative for headaches.   All other systems reviewed and are negative.        Current Medications       Current Outpatient Medications:     predniSONE 10 mg  tablet, 4 x 3 days, 3 x 1, 2 x 1, 1 x 1, Disp: 18 tablet, Rfl: 0    albuterol (PROVENTIL HFA,VENTOLIN HFA) 90 mcg/act inhaler, TAKE 2 PUFFS BY MOUTH EVERY 6 HOURS AS NEEDED FOR COUGH, WHEEZE, OR FOR SHORTNESS OF BREATH, Disp: 8.5 g, Rfl: 3    amLODIPine (NORVASC) 10 mg tablet, TAKE 1 TABLET BY MOUTH EVERY DAY, Disp: 90 tablet, Rfl: 0    atenolol (TENORMIN) 100 mg tablet, Take 1 tablet (100 mg total) by mouth daily Pt needs visit prior to additional refills, Disp: 8 tablet, Rfl: 0    benazepril (LOTENSIN) 40 MG tablet, Take 1 tablet (40 mg total) by mouth daily, Disp: 90 tablet, Rfl: 3    Blood Glucose Monitoring Suppl (FreeStyle Freedom Lite) w/Device KIT, Use to test 2x daily, 2hours after a meal (Patient not taking: Reported on 10/27/2023), Disp: 1 each, Rfl: 1    ergocalciferol (ERGOCALCIFEROL) 1.25 MG (90985 UT) capsule, Take 1 capsule (50,000 Units total) by mouth once a week, Disp: 12 capsule, Rfl: 0    esomeprazole (NexIUM) 40 MG capsule, Take 40 mg by mouth 2 (two) times a day  , Disp: , Rfl: 0    fluticasone (FLONASE) 50 mcg/act nasal spray, 1 spray into each nostril daily as needed for rhinitis, Disp: , Rfl:     furosemide (LASIX) 20 mg tablet, Take 1 tablet (20 mg total) by mouth 3 (three) times a week, Disp: 90 tablet, Rfl: 3    glucose blood (FREESTYLE LITE) test strip, USE AS INSTRUCTED WITH GLUCOMETER TO TEST BLOOD GLUCOSE (Patient not taking: Reported on 10/27/2023), Disp: 100 each, Rfl: 1    Lancets (freestyle) lancets, USE TO TEST BLOOD SUGARSS 2X DAILY 2 HOURS AFTER MEALS (Patient not taking: Reported on 10/27/2023), Disp: 100 each, Rfl: 0    Milk Thistle 250 MG CAPS, Take 2 caplets by mouth daily, Disp: , Rfl:     rosuvastatin (CRESTOR) 40 MG tablet, TAKE 1 TABLET BY MOUTH EVERY DAY, Disp: 30 tablet, Rfl: 0    sildenafil (VIAGRA) 50 MG tablet, TAKE 1 TABLET BY MOUTH DAILY AS NEEDED FOR ERECTILE DYSFUNCTION TAKE 30-60 MIN PRIOR TO SEXUAL ACTIVITY DO NOT TAKE MORE THAN 1 TAB IN 24 HOURS., Disp: 8  tablet, Rfl: 6    Current Allergies     Allergies as of 09/15/2024 - Reviewed 09/15/2024   Allergen Reaction Noted    Other  09/25/2013            The following portions of the patient's history were reviewed and updated as appropriate: allergies, current medications, past family history, past medical history, past social history, past surgical history and problem list.     Past Medical History:   Diagnosis Date    Chest pain     Hypercholesterolemia     Hypertension     Sarcoidosis        Past Surgical History:   Procedure Laterality Date    BRONCHOSCOPY      COLONOSCOPY      complete     CYSTOSCOPY      Diagnostic - Neg 2007    ESOPHAGOGASTRODUODENOSCOPY      Diagnostic        Family History   Problem Relation Age of Onset    Colon cancer Mother         age in 30s    Hyperlipidemia Mother     Hyperlipidemia Father     Pancreatic cancer Father         Adenocarcinoma of the ampulla of vater     Colon cancer Maternal Uncle     Colon cancer Other         maternal relatives - likely MGF and a second cousin in her 30s         Medications have been verified.        Objective   /98   Pulse 103   Temp 98.2 °F (36.8 °C)   Resp 18   SpO2 92%   No LMP for male patient.       Physical Exam     Physical Exam  Vitals and nursing note reviewed.   Constitutional:       Appearance: Normal appearance. He is normal weight.   HENT:      Right Ear: Tympanic membrane, ear canal and external ear normal.      Left Ear: Tympanic membrane, ear canal and external ear normal.      Nose: Congestion and rhinorrhea present.      Mouth/Throat:      Mouth: Mucous membranes are moist.      Pharynx: Posterior oropharyngeal erythema present. No oropharyngeal exudate.   Eyes:      Conjunctiva/sclera: Conjunctivae normal.   Cardiovascular:      Rate and Rhythm: Regular rhythm. Tachycardia present.      Pulses: Normal pulses.      Heart sounds: Normal heart sounds.   Pulmonary:      Effort: Pulmonary effort is normal.      Breath sounds:  Normal breath sounds.   Lymphadenopathy:      Cervical: No cervical adenopathy.   Neurological:      Mental Status: He is alert.   Psychiatric:         Mood and Affect: Mood normal.         Behavior: Behavior normal.

## 2024-09-20 ENCOUNTER — OFFICE VISIT (OUTPATIENT)
Dept: FAMILY MEDICINE CLINIC | Facility: CLINIC | Age: 54
End: 2024-09-20
Payer: COMMERCIAL

## 2024-09-20 ENCOUNTER — TELEPHONE (OUTPATIENT)
Age: 54
End: 2024-09-20

## 2024-09-20 VITALS
WEIGHT: 212 LBS | HEART RATE: 103 BPM | OXYGEN SATURATION: 93 % | SYSTOLIC BLOOD PRESSURE: 130 MMHG | DIASTOLIC BLOOD PRESSURE: 80 MMHG | TEMPERATURE: 97.6 F | BODY MASS INDEX: 30.42 KG/M2

## 2024-09-20 DIAGNOSIS — J01.40 ACUTE NON-RECURRENT PANSINUSITIS: Primary | ICD-10-CM

## 2024-09-20 PROCEDURE — 99214 OFFICE O/P EST MOD 30 MIN: CPT | Performed by: INTERNAL MEDICINE

## 2024-09-20 NOTE — TELEPHONE ENCOUNTER
Patient alisa and stated that he went to Urgent care last week, was given a steriod and still feeling really bad.....Cough, fever congested.  I called clerical as he was looking to be seen today.  Everyone was helping other patients.  Pls call back and see if we can squeeze him in.  [phone]

## 2024-09-23 PROBLEM — J01.40 ACUTE NON-RECURRENT PANSINUSITIS: Status: ACTIVE | Noted: 2024-09-23

## 2024-09-23 NOTE — ASSESSMENT & PLAN NOTE
Clinical presentation consistent with acute sinusitis.  His symptoms are not improving with time so we will add Augmentin 875 mg twice daily for 7 days.  He will update us if no improvement in 3 to 4 days.

## 2024-09-23 NOTE — PROGRESS NOTES
Assessment/Plan:    Acute non-recurrent pansinusitis  Clinical presentation consistent with acute sinusitis.  His symptoms are not improving with time so we will add Augmentin 875 mg twice daily for 7 days.  He will update us if no improvement in 3 to 4 days.       Diagnoses and all orders for this visit:    Acute non-recurrent pansinusitis  -     amoxicillin-clavulanate (AUGMENTIN) 875-125 mg per tablet; Take 1 tablet by mouth every 12 (twelve) hours for 7 days          Subjective:      Patient ID: Moreno Trent is a 54 y.o. male.    Patient came today with complains of persistent pressure in his sinuses, nasal congestion and discomfort in his throat that he has already within the week.    Shortness of Breath  Associated symptoms include fatigue, rhinorrhea and a sore throat. Pertinent negatives include no chest pain or coughing.       The following portions of the patient's history were reviewed and updated as appropriate: allergies, current medications, past family history, past medical history, past social history, past surgical history, and problem list.    Review of Systems   Constitutional:  Positive for fatigue. Negative for appetite change, chills and fever.   HENT:  Positive for congestion, rhinorrhea and sore throat. Negative for sinus pain.    Respiratory:  Negative for cough, chest tightness and shortness of breath.    Cardiovascular:  Negative for chest pain.   Gastrointestinal:  Negative for diarrhea, nausea and vomiting.   Musculoskeletal:  Negative for arthralgias and myalgias.         Objective:      /80 (BP Location: Left arm, Cuff Size: Standard)   Pulse 103   Temp 97.6 °F (36.4 °C) (Tympanic)   Wt 96.2 kg (212 lb)   SpO2 93%   BMI 30.42 kg/m²     Allergies   Allergen Reactions    Other      seasonal          Current Outpatient Medications:     amoxicillin-clavulanate (AUGMENTIN) 875-125 mg per tablet, Take 1 tablet by mouth every 12 (twelve) hours for 7 days, Disp: 14 tablet, Rfl:  0    albuterol (PROVENTIL HFA,VENTOLIN HFA) 90 mcg/act inhaler, TAKE 2 PUFFS BY MOUTH EVERY 6 HOURS AS NEEDED FOR COUGH, WHEEZE, OR FOR SHORTNESS OF BREATH, Disp: 8.5 g, Rfl: 3    amLODIPine (NORVASC) 10 mg tablet, TAKE 1 TABLET BY MOUTH EVERY DAY, Disp: 90 tablet, Rfl: 0    atenolol (TENORMIN) 100 mg tablet, Take 1 tablet (100 mg total) by mouth daily Pt needs visit prior to additional refills, Disp: 8 tablet, Rfl: 0    benazepril (LOTENSIN) 40 MG tablet, Take 1 tablet (40 mg total) by mouth daily, Disp: 90 tablet, Rfl: 3    Blood Glucose Monitoring Suppl (FreeStyle Freedom Lite) w/Device KIT, Use to test 2x daily, 2hours after a meal (Patient not taking: Reported on 10/27/2023), Disp: 1 each, Rfl: 1    ergocalciferol (ERGOCALCIFEROL) 1.25 MG (72583 UT) capsule, Take 1 capsule (50,000 Units total) by mouth once a week, Disp: 12 capsule, Rfl: 0    esomeprazole (NexIUM) 40 MG capsule, Take 40 mg by mouth 2 (two) times a day  , Disp: , Rfl: 0    fluticasone (FLONASE) 50 mcg/act nasal spray, 1 spray into each nostril daily as needed for rhinitis, Disp: , Rfl:     furosemide (LASIX) 20 mg tablet, Take 1 tablet (20 mg total) by mouth 3 (three) times a week, Disp: 90 tablet, Rfl: 3    glucose blood (FREESTYLE LITE) test strip, USE AS INSTRUCTED WITH GLUCOMETER TO TEST BLOOD GLUCOSE (Patient not taking: Reported on 10/27/2023), Disp: 100 each, Rfl: 1    Lancets (freestyle) lancets, USE TO TEST BLOOD SUGARSS 2X DAILY 2 HOURS AFTER MEALS (Patient not taking: Reported on 10/27/2023), Disp: 100 each, Rfl: 0    Milk Thistle 250 MG CAPS, Take 2 caplets by mouth daily, Disp: , Rfl:     predniSONE 10 mg tablet, 4 x 3 days, 3 x 1, 2 x 1, 1 x 1, Disp: 18 tablet, Rfl: 0    rosuvastatin (CRESTOR) 40 MG tablet, TAKE 1 TABLET BY MOUTH EVERY DAY, Disp: 30 tablet, Rfl: 0    sildenafil (VIAGRA) 50 MG tablet, TAKE 1 TABLET BY MOUTH DAILY AS NEEDED FOR ERECTILE DYSFUNCTION TAKE 30-60 MIN PRIOR TO SEXUAL ACTIVITY DO NOT TAKE MORE THAN 1 TAB  IN 24 HOURS., Disp: 8 tablet, Rfl: 6     There are no Patient Instructions on file for this visit.        Physical Exam  Constitutional:       General: He is not in acute distress.     Appearance: He is not ill-appearing or toxic-appearing.   HENT:      Nose: Congestion and rhinorrhea present.      Mouth/Throat:      Pharynx: Posterior oropharyngeal erythema present. No oropharyngeal exudate.   Cardiovascular:      Rate and Rhythm: Normal rate.      Heart sounds: No murmur heard.     No gallop.   Pulmonary:      Effort: No respiratory distress.      Breath sounds: No wheezing or rales.

## 2024-10-23 PROBLEM — J01.40 ACUTE NON-RECURRENT PANSINUSITIS: Status: RESOLVED | Noted: 2024-09-23 | Resolved: 2024-10-23

## 2024-11-25 ENCOUNTER — TELEPHONE (OUTPATIENT)
Age: 54
End: 2024-11-25

## 2024-11-25 NOTE — TELEPHONE ENCOUNTER
Pt had to reschedule his appt. He is requesting to have an appt card sent to him for the appt. NEVAEH- valorie is also on a cancellation list.

## 2025-02-12 DIAGNOSIS — I10 ESSENTIAL HYPERTENSION: ICD-10-CM

## 2025-02-12 DIAGNOSIS — R80.9 PROTEINURIA, UNSPECIFIED TYPE: ICD-10-CM

## 2025-02-12 DIAGNOSIS — I10 PRIMARY HYPERTENSION: ICD-10-CM

## 2025-02-12 DIAGNOSIS — E78.00 HYPERCHOLESTEROLEMIA: ICD-10-CM

## 2025-02-12 DIAGNOSIS — R73.01 IMPAIRED FASTING GLUCOSE: ICD-10-CM

## 2025-02-12 DIAGNOSIS — R79.89 ABNORMAL LFTS: ICD-10-CM

## 2025-02-12 DIAGNOSIS — E83.52 HYPERCALCEMIA: ICD-10-CM

## 2025-02-13 RX ORDER — BENAZEPRIL HYDROCHLORIDE 40 MG/1
40 TABLET ORAL DAILY
Qty: 90 TABLET | Refills: 3 | OUTPATIENT
Start: 2025-02-13

## 2025-02-13 RX ORDER — FUROSEMIDE 20 MG/1
20 TABLET ORAL 3 TIMES WEEKLY
Qty: 36 TABLET | Refills: 9 | OUTPATIENT
Start: 2025-02-14

## 2025-02-13 RX ORDER — SILDENAFIL 50 MG/1
TABLET, FILM COATED ORAL
Qty: 8 TABLET | Refills: 6 | Status: SHIPPED | OUTPATIENT
Start: 2025-02-13

## 2025-02-13 NOTE — TELEPHONE ENCOUNTER
Patient needs to be seen in the office first have not seen the patient in more than a year and there are no recent lab work I am sorry cannot do the refills

## 2025-02-14 NOTE — TELEPHONE ENCOUNTER
Lm for Moreno, we are unable to refill medication as he has not been seen in over a year. Patient needs to schedule a follow up along with labs before we can refill any medications.

## 2025-04-09 ENCOUNTER — TELEPHONE (OUTPATIENT)
Dept: NEPHROLOGY | Facility: CLINIC | Age: 55
End: 2025-04-09

## 2025-04-09 NOTE — TELEPHONE ENCOUNTER
Called patient  reminding to please complete blood and urine test  prior to 4/14 appointment with Esme.

## 2025-04-11 ENCOUNTER — APPOINTMENT (OUTPATIENT)
Dept: LAB | Facility: MEDICAL CENTER | Age: 55
End: 2025-04-11
Attending: INTERNAL MEDICINE
Payer: COMMERCIAL

## 2025-04-11 DIAGNOSIS — N18.2 CKD (CHRONIC KIDNEY DISEASE) STAGE 2, GFR 60-89 ML/MIN: ICD-10-CM

## 2025-04-11 DIAGNOSIS — R80.1 PERSISTENT PROTEINURIA: ICD-10-CM

## 2025-04-11 DIAGNOSIS — E55.9 VITAMIN D DEFICIENCY: ICD-10-CM

## 2025-04-11 DIAGNOSIS — D86.9 SARCOIDOSIS: ICD-10-CM

## 2025-04-11 DIAGNOSIS — R79.89 ELEVATED LIVER FUNCTION TESTS: ICD-10-CM

## 2025-04-11 DIAGNOSIS — E78.00 HYPERCHOLESTEROLEMIA: ICD-10-CM

## 2025-04-11 LAB
25(OH)D3 SERPL-MCNC: 18.5 NG/ML (ref 30–100)
ALBUMIN SERPL BCG-MCNC: 4.2 G/DL (ref 3.5–5)
ANION GAP SERPL CALCULATED.3IONS-SCNC: 7 MMOL/L (ref 4–13)
BUN SERPL-MCNC: 18 MG/DL (ref 5–25)
CALCIUM SERPL-MCNC: 10.1 MG/DL (ref 8.4–10.2)
CHLORIDE SERPL-SCNC: 98 MMOL/L (ref 96–108)
CO2 SERPL-SCNC: 31 MMOL/L (ref 21–32)
CREAT SERPL-MCNC: 0.82 MG/DL (ref 0.6–1.3)
GFR SERPL CREATININE-BSD FRML MDRD: 100 ML/MIN/1.73SQ M
GLUCOSE SERPL-MCNC: 96 MG/DL (ref 65–140)
PHOSPHATE SERPL-MCNC: 3 MG/DL (ref 2.7–4.5)
POTASSIUM SERPL-SCNC: 4.4 MMOL/L (ref 3.5–5.3)
PTH-INTACT SERPL-MCNC: 22.1 PG/ML (ref 12–88)
SODIUM SERPL-SCNC: 136 MMOL/L (ref 135–147)

## 2025-04-11 PROCEDURE — 36415 COLL VENOUS BLD VENIPUNCTURE: CPT

## 2025-04-11 PROCEDURE — 83970 ASSAY OF PARATHORMONE: CPT

## 2025-04-11 PROCEDURE — 82306 VITAMIN D 25 HYDROXY: CPT

## 2025-04-11 PROCEDURE — 80069 RENAL FUNCTION PANEL: CPT

## 2025-04-11 NOTE — TELEPHONE ENCOUNTER
LVM to patient reminding to please complete blood and urine test prior to 4/14 appointment with Esme. Advised patient to call back with any questions or  Concerns

## 2025-04-12 ENCOUNTER — APPOINTMENT (OUTPATIENT)
Dept: LAB | Facility: MEDICAL CENTER | Age: 55
End: 2025-04-12
Payer: COMMERCIAL

## 2025-04-12 LAB
CREAT UR-MCNC: 215.4 MG/DL
MICROALBUMIN UR-MCNC: 632.2 MG/L
MICROALBUMIN/CREAT 24H UR: 294 MG/G CREATININE (ref 0–30)

## 2025-04-12 PROCEDURE — 82570 ASSAY OF URINE CREATININE: CPT

## 2025-04-12 PROCEDURE — 82043 UR ALBUMIN QUANTITATIVE: CPT

## 2025-04-14 ENCOUNTER — OFFICE VISIT (OUTPATIENT)
Dept: NEPHROLOGY | Facility: CLINIC | Age: 55
End: 2025-04-14
Payer: COMMERCIAL

## 2025-04-14 VITALS
HEIGHT: 70 IN | SYSTOLIC BLOOD PRESSURE: 132 MMHG | BODY MASS INDEX: 28.92 KG/M2 | DIASTOLIC BLOOD PRESSURE: 82 MMHG | WEIGHT: 202 LBS

## 2025-04-14 DIAGNOSIS — R80.9 PROTEINURIA, UNSPECIFIED TYPE: Primary | ICD-10-CM

## 2025-04-14 DIAGNOSIS — D86.9 SARCOIDOSIS: ICD-10-CM

## 2025-04-14 DIAGNOSIS — N18.2 CKD (CHRONIC KIDNEY DISEASE) STAGE 2, GFR 60-89 ML/MIN: ICD-10-CM

## 2025-04-14 DIAGNOSIS — E55.9 VITAMIN D DEFICIENCY: ICD-10-CM

## 2025-04-14 DIAGNOSIS — E83.52 HYPERCALCEMIA: ICD-10-CM

## 2025-04-14 DIAGNOSIS — I10 PRIMARY HYPERTENSION: ICD-10-CM

## 2025-04-14 PROCEDURE — 99214 OFFICE O/P EST MOD 30 MIN: CPT | Performed by: PHYSICIAN ASSISTANT

## 2025-04-14 RX ORDER — ERGOCALCIFEROL 1.25 MG/1
50000 CAPSULE ORAL WEEKLY
Qty: 12 CAPSULE | Refills: 0 | Status: SHIPPED | OUTPATIENT
Start: 2025-04-14

## 2025-04-14 NOTE — ASSESSMENT & PLAN NOTE
Last vitamin D 18.5  Restart ergocalciferol 50,000 units weekly x 12 weeks then decrease to 1000 units daily over-the-counter

## 2025-04-14 NOTE — PROGRESS NOTES
OFFICE FOLLOW UP - Nephrology   Moreno Trent 55 y.o. male MRN: [id number]     Assessment & Plan  CKD (chronic kidney disease) stage 2, GFR 60-89 ml/min  Baseline creatinine 0.9-1.3.  Last creatinine a little below baseline at 0.82  Likely related to hypertension, prior episodes of acute kidney injury, age-related nephron loss and chronic alcohol use  Does have a history of sarcoidosis but no current indication for renal biopsy  Proteinuria, unspecified type  Recent urine albumin creatinine ratio 294 mg  Discussed the importance of good blood pressure control to help prevent worsening proteinuria  Continue benazepril  Complains of red urine --will check UA with microscopy  Primary hypertension  Blood pressure currently acceptable  Continue current medications  Continue low-sodium diet  Vitamin D deficiency  Last vitamin D 18.5  Restart ergocalciferol 50,000 units weekly x 12 weeks then decrease to 1000 units daily over-the-counter  Sarcoidosis  Follows with pulmonology for ELEONORA  Not currently on any treatment  Hypercalcemia  Intermittent hypercalcemia  Previously attributed to HCTZ which was then discontinued  Last calcium 10.1  Does have a history of sarcoidosis so we will monitor closely        Plan:   Check UA with microscopy given complaints of red urine  Repeat labs in 6 months and then follow-up in 12 months with repeat labs and prior    HPI: Moreno Trent is a 55 y.o. male who is here for CKD follow-up.    Patient thinks he is doing well recently.  He recently saw pulmonology for sleep apnea.  He reports cutting down on alcohol intake.  He does feel his urine has been very red recently.  He denies any chest pain, shortness of breath, nausea, vomiting or diarrhea.    ROS:   A complete 10 point review of systems was done. Pertinent positives and negatives as noted in the HPI, otherwise the review of systems is negative.    Allergies: Other    Medications:   Current Outpatient Medications:     albuterol  (PROVENTIL HFA,VENTOLIN HFA) 90 mcg/act inhaler, TAKE 2 PUFFS BY MOUTH EVERY 6 HOURS AS NEEDED FOR COUGH, WHEEZE, OR FOR SHORTNESS OF BREATH, Disp: 8.5 g, Rfl: 3    amLODIPine (NORVASC) 10 mg tablet, TAKE 1 TABLET BY MOUTH EVERY DAY, Disp: 90 tablet, Rfl: 0    atenolol (TENORMIN) 100 mg tablet, Take 1 tablet (100 mg total) by mouth daily Pt needs visit prior to additional refills, Disp: 8 tablet, Rfl: 0    benazepril (LOTENSIN) 40 MG tablet, Take 1 tablet (40 mg total) by mouth daily, Disp: 90 tablet, Rfl: 3    ergocalciferol (ERGOCALCIFEROL) 1.25 MG (99740 UT) capsule, Take 1 capsule (50,000 Units total) by mouth once a week, Disp: 12 capsule, Rfl: 0    esomeprazole (NexIUM) 40 MG capsule, Take 40 mg by mouth 2 (two) times a day  , Disp: , Rfl: 0    fluticasone (FLONASE) 50 mcg/act nasal spray, 1 spray into each nostril daily as needed for rhinitis, Disp: , Rfl:     Milk Thistle 250 MG CAPS, Take 2 caplets by mouth daily, Disp: , Rfl:     rosuvastatin (CRESTOR) 40 MG tablet, TAKE 1 TABLET BY MOUTH EVERY DAY, Disp: 30 tablet, Rfl: 0    sildenafil (VIAGRA) 50 MG tablet, TAKE 1 TABLET BY MOUTH DAILY AS NEEDED FOR ERECTILE DYSFUNCTION TAKE 30-60 MIN PRIOR TO SEXUAL ACTIVITY DO NOT TAKE MORE THAN 1 TAB IN 24 HOURS., Disp: 8 tablet, Rfl: 6    Blood Glucose Monitoring Suppl (FreeStyle Freedom Lite) w/Device KIT, Use to test 2x daily, 2hours after a meal (Patient not taking: Reported on 10/27/2023), Disp: 1 each, Rfl: 1    glucose blood (FREESTYLE LITE) test strip, USE AS INSTRUCTED WITH GLUCOMETER TO TEST BLOOD GLUCOSE (Patient not taking: Reported on 10/27/2023), Disp: 100 each, Rfl: 1    Lancets (freestyle) lancets, USE TO TEST BLOOD SUGARSS 2X DAILY 2 HOURS AFTER MEALS (Patient not taking: Reported on 10/27/2023), Disp: 100 each, Rfl: 0    predniSONE 10 mg tablet, 4 x 3 days, 3 x 1, 2 x 1, 1 x 1 (Patient not taking: Reported on 4/14/2025), Disp: 18 tablet, Rfl: 0    Past Medical History:   Diagnosis Date    Chest pain   "   Hypercholesterolemia     Hypertension     Sarcoidosis      Past Surgical History:   Procedure Laterality Date    BRONCHOSCOPY      COLONOSCOPY      complete     CYSTOSCOPY      Diagnostic - Neg 2007    ESOPHAGOGASTRODUODENOSCOPY      Diagnostic      Family History   Problem Relation Age of Onset    Colon cancer Mother         age in 30s    Hyperlipidemia Mother     Hyperlipidemia Father     Pancreatic cancer Father         Adenocarcinoma of the ampulla of vater     Colon cancer Maternal Uncle     Colon cancer Other         maternal relatives - likely MGF and a second cousin in her 30s      reports that he quit smoking about 26 years ago. His smoking use included cigarettes. He has quit using smokeless tobacco. He reports current alcohol use. He reports that he does not use drugs.      Physical Exam:   Vitals:    04/14/25 0804   BP: 132/82   BP Location: Left arm   Patient Position: Sitting   Cuff Size: Standard   Weight: 91.6 kg (202 lb)   Height: 5' 10\" (1.778 m)     Body mass index is 28.98 kg/m².    General: no acute distress   Eyes: conjunctivae pink, anicteric sclerae  ENT: mucous membranes moist  Neck: supple, no JVD  Chest: clear to auscultation bilaterally with no wheezes, rale or rhochi  CVS: regular rate and rhythm   Abdomen: soft, non-tender, non-distended  Extremities: no lower extremity edema   Skin: no rash  Neuro: awake and alert       Lab Results:  Results for orders placed or performed in visit on 04/11/25   PTH, intact   Result Value Ref Range    PTH 22.1 12.0 - 88.0 pg/mL   Renal function panel   Result Value Ref Range    Albumin 4.2 3.5 - 5.0 g/dL    Calcium 10.1 8.4 - 10.2 mg/dL    Phosphorus 3.0 2.7 - 4.5 mg/dL    Glucose 96 65 - 140 mg/dL    BUN 18 5 - 25 mg/dL    Creatinine 0.82 0.60 - 1.30 mg/dL    Sodium 136 135 - 147 mmol/L    Potassium 4.4 3.5 - 5.3 mmol/L    Chloride 98 96 - 108 mmol/L    CO2 31 21 - 32 mmol/L    ANION GAP 7 4 - 13 mmol/L    eGFR 100 ml/min/1.73sq m   Vitamin D 25 " "hydroxy   Result Value Ref Range    Vit D, 25-Hydroxy 18.5 (L) 30.0 - 100.0 ng/mL   Albumin / creatinine urine ratio   Result Value Ref Range    Creatinine, Ur 215.4 Reference range not established. mg/dL    Albumin,U,Random 632.2 (H) <20.0 mg/L    Albumin Creat Ratio 294 (H) 0 - 30 mg/g creatinine       Results from last 7 days   Lab Units 04/11/25  1739   SODIUM mmol/L 136   POTASSIUM mmol/L 4.4   CHLORIDE mmol/L 98   CO2 mmol/L 31   BUN mg/dL 18   CREATININE mg/dL 0.82   CALCIUM mg/dL 10.1   PHOSPHORUS mg/dL 3.0           Portions of the record may have been created with voice recognition software. Occasional wrong word or \"sound a like\" substitutions may have occurred due to the inherent limitations of voice recognition software. Read the chart carefully and recognize, using context, where substitutions have occurred.If you have any questions, please contact the dictating provider.  "

## 2025-04-14 NOTE — ASSESSMENT & PLAN NOTE
Baseline creatinine 0.9-1.3.  Last creatinine a little below baseline at 0.82  Likely related to hypertension, prior episodes of acute kidney injury, age-related nephron loss and chronic alcohol use  Does have a history of sarcoidosis but no current indication for renal biopsy

## 2025-07-10 DIAGNOSIS — E55.9 VITAMIN D DEFICIENCY: ICD-10-CM

## 2025-07-10 DIAGNOSIS — N18.2 CKD (CHRONIC KIDNEY DISEASE) STAGE 2, GFR 60-89 ML/MIN: ICD-10-CM

## 2025-07-10 RX ORDER — ERGOCALCIFEROL 1.25 MG/1
CAPSULE, LIQUID FILLED ORAL
Qty: 12 CAPSULE | Refills: 0 | OUTPATIENT
Start: 2025-07-10

## 2025-07-10 NOTE — TELEPHONE ENCOUNTER
LVM to patient let him know that his refill of vitamin D2 was refused. Reason refill not appropriate. Advised to please call our office to let us know he received the message and if have any other questions or concerns.

## 2025-08-01 ENCOUNTER — OFFICE VISIT (OUTPATIENT)
Dept: FAMILY MEDICINE CLINIC | Facility: CLINIC | Age: 55
End: 2025-08-01
Payer: COMMERCIAL

## 2025-08-01 VITALS
SYSTOLIC BLOOD PRESSURE: 142 MMHG | DIASTOLIC BLOOD PRESSURE: 94 MMHG | TEMPERATURE: 96.5 F | OXYGEN SATURATION: 97 % | HEART RATE: 93 BPM | WEIGHT: 199.4 LBS | BODY MASS INDEX: 28.61 KG/M2

## 2025-08-01 DIAGNOSIS — G47.33 OBSTRUCTIVE SLEEP APNEA SYNDROME: ICD-10-CM

## 2025-08-01 DIAGNOSIS — I10 PRIMARY HYPERTENSION: ICD-10-CM

## 2025-08-01 DIAGNOSIS — F10.20 ALCOHOLISM (HCC): Primary | ICD-10-CM

## 2025-08-01 DIAGNOSIS — J45.20 MILD INTERMITTENT ASTHMA WITHOUT COMPLICATION: ICD-10-CM

## 2025-08-01 PROCEDURE — 99214 OFFICE O/P EST MOD 30 MIN: CPT | Performed by: FAMILY MEDICINE

## 2025-08-01 RX ORDER — NALTREXONE HYDROCHLORIDE 50 MG/1
50 TABLET, FILM COATED ORAL DAILY
Qty: 90 TABLET | Refills: 1 | Status: SHIPPED | OUTPATIENT
Start: 2025-08-01

## 2025-08-14 ENCOUNTER — OFFICE VISIT (OUTPATIENT)
Dept: FAMILY MEDICINE CLINIC | Facility: CLINIC | Age: 55
End: 2025-08-14
Payer: COMMERCIAL

## 2025-08-14 ENCOUNTER — TELEPHONE (OUTPATIENT)
Dept: FAMILY MEDICINE CLINIC | Facility: CLINIC | Age: 55
End: 2025-08-14

## 2025-08-14 ENCOUNTER — TELEPHONE (OUTPATIENT)
Dept: ADMINISTRATIVE | Facility: OTHER | Age: 55
End: 2025-08-14

## 2025-08-14 PROBLEM — Z13.39 ALCOHOL SCREENING: Status: RESOLVED | Noted: 2019-09-06 | Resolved: 2025-08-14

## 2025-08-14 PROBLEM — F10.21 ALCOHOLISM IN REMISSION (HCC): Status: ACTIVE | Noted: 2025-08-14
